# Patient Record
Sex: FEMALE | Race: WHITE | Employment: UNEMPLOYED | URBAN - METROPOLITAN AREA
[De-identification: names, ages, dates, MRNs, and addresses within clinical notes are randomized per-mention and may not be internally consistent; named-entity substitution may affect disease eponyms.]

---

## 2020-04-18 ENCOUNTER — APPOINTMENT (OUTPATIENT)
Dept: CT IMAGING | Age: 25
End: 2020-04-18
Payer: MEDICAID

## 2020-04-18 ENCOUNTER — HOSPITAL ENCOUNTER (EMERGENCY)
Age: 25
Discharge: HOME | End: 2020-04-18
Payer: MEDICAID

## 2020-04-18 VITALS
OXYGEN SATURATION: 100 % | BODY MASS INDEX: 19.63 KG/M2 | HEIGHT: 64 IN | HEART RATE: 78 BPM | SYSTOLIC BLOOD PRESSURE: 123 MMHG | WEIGHT: 115 LBS | TEMPERATURE: 98.7 F | RESPIRATION RATE: 18 BRPM | DIASTOLIC BLOOD PRESSURE: 84 MMHG

## 2020-04-18 LAB
ALBUMIN SERPL-MCNC: 4.1 GM/DL (ref 3.4–5)
ALP BLD-CCNC: 50 IU/L (ref 40–129)
ALT SERPL-CCNC: 11 U/L (ref 10–40)
ANION GAP SERPL CALCULATED.3IONS-SCNC: 10 MMOL/L (ref 4–16)
AST SERPL-CCNC: 15 IU/L (ref 15–37)
ATYPICAL LYMPHOCYTE ABSOLUTE COUNT: ABNORMAL
BACTERIA: ABNORMAL /HPF
BANDED NEUTROPHILS ABSOLUTE COUNT: 0.26 K/CU MM
BANDED NEUTROPHILS RELATIVE PERCENT: 3 % (ref 5–11)
BASOPHILS ABSOLUTE: 0.1 K/CU MM
BASOPHILS RELATIVE PERCENT: 1 % (ref 0–1)
BILIRUB SERPL-MCNC: 0.1 MG/DL (ref 0–1)
BILIRUBIN URINE: NEGATIVE MG/DL
BLOOD, URINE: ABNORMAL
BUN BLDV-MCNC: 7 MG/DL (ref 6–23)
CALCIUM SERPL-MCNC: 8.4 MG/DL (ref 8.3–10.6)
CHLORIDE BLD-SCNC: 103 MMOL/L (ref 99–110)
CLARITY: ABNORMAL
CO2: 26 MMOL/L (ref 21–32)
COLOR: YELLOW
CREAT SERPL-MCNC: 0.7 MG/DL (ref 0.6–1.1)
DIFFERENTIAL TYPE: ABNORMAL
EOSINOPHILS ABSOLUTE: 0.2 K/CU MM
EOSINOPHILS RELATIVE PERCENT: 2 % (ref 0–3)
GFR AFRICAN AMERICAN: >60 ML/MIN/1.73M2
GFR NON-AFRICAN AMERICAN: >60 ML/MIN/1.73M2
GLUCOSE BLD-MCNC: 108 MG/DL (ref 70–99)
GLUCOSE, URINE: NEGATIVE MG/DL
HCG QUALITATIVE: NEGATIVE
HCT VFR BLD CALC: 44.2 % (ref 37–47)
HEMOGLOBIN: 14 GM/DL (ref 12.5–16)
KETONES, URINE: NEGATIVE MG/DL
LEUKOCYTE ESTERASE, URINE: NEGATIVE
LIPASE: 18 IU/L (ref 13–60)
LYMPHOCYTES ABSOLUTE: 2.6 K/CU MM
LYMPHOCYTES RELATIVE PERCENT: 29 % (ref 24–44)
MCH RBC QN AUTO: 29.4 PG (ref 27–31)
MCHC RBC AUTO-ENTMCNC: 31.7 % (ref 32–36)
MCV RBC AUTO: 92.7 FL (ref 78–100)
METAMYELOCYTES ABSOLUTE COUNT: 0.09 K/CU MM
METAMYELOCYTES PERCENT: 1 %
MONOCYTES ABSOLUTE: 0.5 K/CU MM
MONOCYTES RELATIVE PERCENT: 6 % (ref 0–4)
MUCUS: ABNORMAL HPF
MYELOCYTE PERCENT: 1 %
MYELOCYTES ABSOLUTE COUNT: 0.09 K/CU MM
NITRITE URINE, QUANTITATIVE: POSITIVE
PDW BLD-RTO: 12.8 % (ref 11.7–14.9)
PH, URINE: 7 (ref 5–8)
PLATELET # BLD: 228 K/CU MM (ref 140–440)
PMV BLD AUTO: 10.9 FL (ref 7.5–11.1)
POTASSIUM SERPL-SCNC: 4.2 MMOL/L (ref 3.5–5.1)
PROTEIN UA: NEGATIVE MG/DL
RBC # BLD: 4.77 M/CU MM (ref 4.2–5.4)
RBC URINE: <1 /HPF (ref 0–6)
SEGMENTED NEUTROPHILS ABSOLUTE COUNT: 5 K/CU MM
SEGMENTED NEUTROPHILS RELATIVE PERCENT: 57 % (ref 36–66)
SODIUM BLD-SCNC: 139 MMOL/L (ref 135–145)
SPECIFIC GRAVITY UA: 1 (ref 1–1.03)
SQUAMOUS EPITHELIAL: 1 /HPF
TOTAL PROTEIN: 6.7 GM/DL (ref 6.4–8.2)
TRICHOMONAS: ABNORMAL /HPF
UROBILINOGEN, URINE: NORMAL MG/DL (ref 0.2–1)
WBC # BLD: 8.8 K/CU MM (ref 4–10.5)
WBC UA: 1 /HPF (ref 0–5)

## 2020-04-18 PROCEDURE — 6370000000 HC RX 637 (ALT 250 FOR IP): Performed by: PHYSICIAN ASSISTANT

## 2020-04-18 PROCEDURE — 99284 EMERGENCY DEPT VISIT MOD MDM: CPT

## 2020-04-18 PROCEDURE — 87077 CULTURE AEROBIC IDENTIFY: CPT

## 2020-04-18 PROCEDURE — 6360000002 HC RX W HCPCS: Performed by: PHYSICIAN ASSISTANT

## 2020-04-18 PROCEDURE — 36415 COLL VENOUS BLD VENIPUNCTURE: CPT

## 2020-04-18 PROCEDURE — 96372 THER/PROPH/DIAG INJ SC/IM: CPT

## 2020-04-18 PROCEDURE — 70450 CT HEAD/BRAIN W/O DYE: CPT

## 2020-04-18 PROCEDURE — 72125 CT NECK SPINE W/O DYE: CPT

## 2020-04-18 PROCEDURE — 85027 COMPLETE CBC AUTOMATED: CPT

## 2020-04-18 PROCEDURE — 81001 URINALYSIS AUTO W/SCOPE: CPT

## 2020-04-18 PROCEDURE — 83690 ASSAY OF LIPASE: CPT

## 2020-04-18 PROCEDURE — 84703 CHORIONIC GONADOTROPIN ASSAY: CPT

## 2020-04-18 PROCEDURE — 93010 ELECTROCARDIOGRAM REPORT: CPT | Performed by: INTERNAL MEDICINE

## 2020-04-18 PROCEDURE — 93005 ELECTROCARDIOGRAM TRACING: CPT | Performed by: PHYSICIAN ASSISTANT

## 2020-04-18 PROCEDURE — 80053 COMPREHEN METABOLIC PANEL: CPT

## 2020-04-18 PROCEDURE — 87086 URINE CULTURE/COLONY COUNT: CPT

## 2020-04-18 PROCEDURE — 87186 SC STD MICRODIL/AGAR DIL: CPT

## 2020-04-18 PROCEDURE — 85007 BL SMEAR W/DIFF WBC COUNT: CPT

## 2020-04-18 RX ORDER — ACETAMINOPHEN 500 MG
1000 TABLET ORAL ONCE
Status: COMPLETED | OUTPATIENT
Start: 2020-04-18 | End: 2020-04-18

## 2020-04-18 RX ORDER — DIPHENHYDRAMINE HYDROCHLORIDE 50 MG/ML
50 INJECTION INTRAMUSCULAR; INTRAVENOUS ONCE
Status: COMPLETED | OUTPATIENT
Start: 2020-04-18 | End: 2020-04-18

## 2020-04-18 RX ORDER — METOCLOPRAMIDE HYDROCHLORIDE 5 MG/ML
10 INJECTION INTRAMUSCULAR; INTRAVENOUS ONCE
Status: COMPLETED | OUTPATIENT
Start: 2020-04-18 | End: 2020-04-18

## 2020-04-18 RX ADMIN — METOCLOPRAMIDE 10 MG: 5 INJECTION, SOLUTION INTRAMUSCULAR; INTRAVENOUS at 12:29

## 2020-04-18 RX ADMIN — ACETAMINOPHEN 1000 MG: 500 TABLET ORAL at 12:29

## 2020-04-18 RX ADMIN — DIPHENHYDRAMINE HYDROCHLORIDE 50 MG: 50 INJECTION, SOLUTION INTRAMUSCULAR; INTRAVENOUS at 12:29

## 2020-04-18 ASSESSMENT — PAIN SCALES - GENERAL
PAINLEVEL_OUTOF10: 5
PAINLEVEL_OUTOF10: 5

## 2020-04-18 ASSESSMENT — PAIN DESCRIPTION - DESCRIPTORS: DESCRIPTORS: ACHING;CONSTANT

## 2020-04-18 ASSESSMENT — PAIN DESCRIPTION - LOCATION: LOCATION: HEAD

## 2020-04-18 ASSESSMENT — PAIN DESCRIPTION - PAIN TYPE: TYPE: ACUTE PAIN

## 2020-04-18 NOTE — ED NOTES
Tito goldman), type & screen on patient.       [facility]  04/18/20 1052

## 2020-04-18 NOTE — ED NOTES
Discharge instructions reviewed. Pt verbalized understanding.        Margie Portillo RN  04/18/20 4409

## 2020-04-21 LAB
CULTURE: ABNORMAL
Lab: ABNORMAL
SPECIMEN: ABNORMAL
TOTAL COLONY COUNT: ABNORMAL

## 2020-04-28 LAB
EKG ATRIAL RATE: 70 BPM
EKG DIAGNOSIS: NORMAL
EKG P AXIS: 55 DEGREES
EKG P-R INTERVAL: 152 MS
EKG Q-T INTERVAL: 406 MS
EKG QRS DURATION: 80 MS
EKG QTC CALCULATION (BAZETT): 438 MS
EKG R AXIS: 74 DEGREES
EKG T AXIS: 51 DEGREES
EKG VENTRICULAR RATE: 70 BPM

## 2020-06-22 ENCOUNTER — HOSPITAL ENCOUNTER (EMERGENCY)
Age: 25
Discharge: HOME | End: 2020-06-22
Attending: EMERGENCY MEDICINE
Payer: MEDICAID

## 2020-06-22 ENCOUNTER — APPOINTMENT (OUTPATIENT)
Dept: CT IMAGING | Age: 25
End: 2020-06-22
Payer: MEDICAID

## 2020-06-22 VITALS
RESPIRATION RATE: 18 BRPM | OXYGEN SATURATION: 100 % | DIASTOLIC BLOOD PRESSURE: 60 MMHG | HEIGHT: 64 IN | HEART RATE: 85 BPM | WEIGHT: 115 LBS | SYSTOLIC BLOOD PRESSURE: 109 MMHG | BODY MASS INDEX: 19.63 KG/M2 | TEMPERATURE: 98.8 F

## 2020-06-22 LAB
ALBUMIN SERPL-MCNC: 4.5 GM/DL (ref 3.4–5)
ALP BLD-CCNC: 42 IU/L (ref 40–129)
ALT SERPL-CCNC: 10 U/L (ref 10–40)
ANION GAP SERPL CALCULATED.3IONS-SCNC: 8 MMOL/L (ref 4–16)
AST SERPL-CCNC: 14 IU/L (ref 15–37)
BASOPHILS ABSOLUTE: 0.1 K/CU MM
BASOPHILS RELATIVE PERCENT: 0.6 % (ref 0–1)
BILIRUB SERPL-MCNC: 0.5 MG/DL (ref 0–1)
BUN BLDV-MCNC: 6 MG/DL (ref 6–23)
CALCIUM SERPL-MCNC: 9.3 MG/DL (ref 8.3–10.6)
CHLORIDE BLD-SCNC: 101 MMOL/L (ref 99–110)
CO2: 28 MMOL/L (ref 21–32)
CREAT SERPL-MCNC: 0.7 MG/DL (ref 0.6–1.1)
DIFFERENTIAL TYPE: ABNORMAL
EOSINOPHILS ABSOLUTE: 0.1 K/CU MM
EOSINOPHILS RELATIVE PERCENT: 1.3 % (ref 0–3)
GFR AFRICAN AMERICAN: >60 ML/MIN/1.73M2
GFR NON-AFRICAN AMERICAN: >60 ML/MIN/1.73M2
GLUCOSE BLD-MCNC: 86 MG/DL (ref 70–99)
HCG QUALITATIVE: NEGATIVE
HCT VFR BLD CALC: 37.9 % (ref 37–47)
HEMOGLOBIN: 12.5 GM/DL (ref 12.5–16)
IMMATURE NEUTROPHIL %: 0.3 % (ref 0–0.43)
LYMPHOCYTES ABSOLUTE: 3.3 K/CU MM
LYMPHOCYTES RELATIVE PERCENT: 30.7 % (ref 24–44)
MCH RBC QN AUTO: 30.2 PG (ref 27–31)
MCHC RBC AUTO-ENTMCNC: 33 % (ref 32–36)
MCV RBC AUTO: 91.5 FL (ref 78–100)
MONOCYTES ABSOLUTE: 0.9 K/CU MM
MONOCYTES RELATIVE PERCENT: 8.3 % (ref 0–4)
NUCLEATED RBC %: 0 %
PDW BLD-RTO: 13.4 % (ref 11.7–14.9)
PLATELET # BLD: 276 K/CU MM (ref 140–440)
PMV BLD AUTO: 10.3 FL (ref 7.5–11.1)
POTASSIUM SERPL-SCNC: 3.6 MMOL/L (ref 3.5–5.1)
RBC # BLD: 4.14 M/CU MM (ref 4.2–5.4)
SEGMENTED NEUTROPHILS ABSOLUTE COUNT: 6.3 K/CU MM
SEGMENTED NEUTROPHILS RELATIVE PERCENT: 58.8 % (ref 36–66)
SODIUM BLD-SCNC: 137 MMOL/L (ref 135–145)
TOTAL IMMATURE NEUTOROPHIL: 0.03 K/CU MM
TOTAL NUCLEATED RBC: 0 K/CU MM
TOTAL PROTEIN: 7.3 GM/DL (ref 6.4–8.2)
TOTAL RETICULOCYTE COUNT: 0.07 K/CU MM
WBC # BLD: 10.8 K/CU MM (ref 4–10.5)

## 2020-06-22 PROCEDURE — 70450 CT HEAD/BRAIN W/O DYE: CPT

## 2020-06-22 PROCEDURE — 99284 EMERGENCY DEPT VISIT MOD MDM: CPT

## 2020-06-22 PROCEDURE — 84703 CHORIONIC GONADOTROPIN ASSAY: CPT

## 2020-06-22 PROCEDURE — 85025 COMPLETE CBC W/AUTO DIFF WBC: CPT

## 2020-06-22 PROCEDURE — 72131 CT LUMBAR SPINE W/O DYE: CPT

## 2020-06-22 PROCEDURE — 80053 COMPREHEN METABOLIC PANEL: CPT

## 2020-06-22 RX ORDER — CYCLOBENZAPRINE HCL 10 MG
10 TABLET ORAL 3 TIMES DAILY PRN
Qty: 20 TABLET | Refills: 0 | Status: SHIPPED | OUTPATIENT
Start: 2020-06-22 | End: 2020-07-02

## 2020-06-22 ASSESSMENT — PAIN DESCRIPTION - PAIN TYPE: TYPE: ACUTE PAIN

## 2020-06-22 ASSESSMENT — PAIN SCALES - GENERAL: PAINLEVEL_OUTOF10: 7

## 2020-06-23 NOTE — ED PROVIDER NOTES
6/22/20 7/2/20 Yes Jose Guadalupe Chu PA-C   dicyclomine (BENTYL) 20 MG tablet Take 1 tablet by mouth See Admin Instructions One tablet by mouth every 6-8 hours when necessary for abdominal pain or cramping. 5/22/18   Ольга Darden MD       Social history:  reports that she has been smoking. She has been smoking about 0.50 packs per day. She has never used smokeless tobacco. She reports current alcohol use. She reports current drug use. Drug: Marijuana. Family history:  History reviewed. No pertinent family history. Exam  /60   Pulse 85   Temp 98.8 °F (37.1 °C) (Oral)   Resp 18   Ht 5' 4.25\" (1.632 m)   Wt 115 lb (52.2 kg)   SpO2 100%   BMI 19.59 kg/m²   Nursing note and vitals reviewed. Constitutional: Well developed, well nourished. No acute distress. HENT:      Head: Normocephalic and atraumatic. Ears: External ears normal.      Nose: Nose normal.     Mouth: Membrane mucosa moist and pink. No posterior oropharynx erythema or tonsillar edema  Eyes: Anicteric sclera. No discharge, PERRL  Neck: Supple. Trachea midline. Cardiovascular: RRR, no murmurs, rubs, or gallops, radial pulses 2+ bilaterally. Pulmonary/Chest: Effort normal. No respiratory distress. CTAB. No stridor. No wheezes. No rales. Abdominal: Soft. Nontender to palpation. No distension. No guarding, rebound tenderness, or evidence of ascites. : No CVA tenderness. Musculoskeletal: Moves all extremities. No gross deformity. No tenderness palpation of the cervical, thoracic, lumbar spine or paraspinal muscles. When palpating thoracic spine patient reports this exacerbates her spasms, has retraction of the bilateral scapula in a rhythmic and circular manner as well as abnormal movements of the left hand. This stops when I am not palpating patient's back. When examining her left hand her left elbow begins to flex in a spasmodic fashion, this also stops when I stop examining her.   NEUROLOGICAL: Awake and alert. GCS 15. Cranial nerves 2-12 grossly intact. Strength 5/5 throughout. Light touch sensation intact throughout. Finger to nose WNL. DTR's 2+ in all 4 extremities. Gait normal.  Skin: Warm and dry. No rash. Psychiatric: Normal mood and affect. Behavior is normal.      Radiographs (if obtained):  [] The following radiograph was interpreted by myself in the absence of a radiologist:   [x] Radiologist's Report Reviewed:  CT Head WO Contrast   Final Result   No acute intracranial abnormality. Stable mild Chiari I malformation. CT LUMBAR SPINE WO CONTRAST   Final Result   Unremarkable non-contrast CT of the lumbar spine.                 Labs  Results for orders placed or performed during the hospital encounter of 06/22/20   CBC Auto Differential   Result Value Ref Range    WBC 10.8 (H) 4.0 - 10.5 K/CU MM    RBC 4.14 (L) 4.2 - 5.4 M/CU MM    Hemoglobin 12.5 12.5 - 16.0 GM/DL    Hematocrit 37.9 37 - 47 %    MCV 91.5 78 - 100 FL    MCH 30.2 27 - 31 PG    MCHC 33.0 32.0 - 36.0 %    RDW 13.4 11.7 - 14.9 %    Platelets 081 865 - 533 K/CU MM    MPV 10.3 7.5 - 11.1 FL    Differential Type AUTOMATED DIFFERENTIAL     Segs Relative 58.8 36 - 66 %    Lymphocytes % 30.7 24 - 44 %    Monocytes % 8.3 (H) 0 - 4 %    Eosinophils % 1.3 0 - 3 %    Basophils % 0.6 0 - 1 %    Segs Absolute 6.3 K/CU MM    Lymphocytes Absolute 3.3 K/CU MM    Monocytes Absolute 0.9 K/CU MM    Eosinophils Absolute 0.1 K/CU MM    Basophils Absolute 0.1 K/CU MM    Nucleated RBC % 0.0 %    Total Nucleated RBC 0.0 K/CU MM    TRC 0.0696 K/CU MM    Total Immature Neutrophil 0.03 K/CU MM    Immature Neutrophil % 0.3 0 - 0.43 %   CMP   Result Value Ref Range    Sodium 137 135 - 145 MMOL/L    Potassium 3.6 3.5 - 5.1 MMOL/L    Chloride 101 99 - 110 mMol/L    CO2 28 21 - 32 MMOL/L    BUN 6 6 - 23 MG/DL    CREATININE 0.7 0.6 - 1.1 MG/DL    Glucose 86 70 - 99 MG/DL    Calcium 9.3 8.3 - 10.6 MG/DL    Alb 4.5 3.4 - 5.0 GM/DL    Total Protein 7.3 6.4 - 8.2 GM/DL    Total Bilirubin 0.5 0.0 - 1.0 MG/DL    ALT 10 10 - 40 U/L    AST 14 (L) 15 - 37 IU/L    Alkaline Phosphatase 42 40 - 129 IU/L    GFR Non-African American >60 >60 mL/min/1.73m2    GFR African American >60 >60 mL/min/1.73m2    Anion Gap 8 4 - 16   HCG Serum, Qualitative   Result Value Ref Range    hCG Qual NEGATIVE          MDM  Patient presents for multiple symptoms. Muscle spasms appear to occur only when being examined, none at rest.  She appears in no distress. At this time I do not see any evidence of a dystonic reaction or any true spasticity to raise concern for worsening Chiari malformation. Imaging of head and lumbar spine reveals no acute findings, stable mild Chiari malformation. Laboratory work-up reveals slight leukocytosis but is otherwise unremarkable. Patient is requesting different neurologist for follow-up, provided Dr. GALINDO Liu's information. Recommended calling their office today for earliest appointment. Will start on flexeril for spasms. Follow-up with PCP in 2 to 3 days for recheck. Return to ED for any new or worsening symptoms. Assessment and plan discussed with patient understands and agrees. This patient was also seen and evaluated by Dr. Rajeev Cavanaugh. Final Impression  1. Dizziness    2. Spasm of muscle    3. Acute left-sided low back pain without sciatica    4. Chiari malformation type I ([address]        Blood pressure 109/60, pulse 85, temperature 98.8 °F (37.1 °C), temperature source Oral, resp. rate 18, height 5' 4.25\" (1.632 m), weight 115 lb (52.2 kg), SpO2 100 %. Disposition:  Discharge to home in stable condition. Patient was given scripts for the following medications. I counseled patient how to take these medications. New Prescriptions    CYCLOBENZAPRINE (FLEXERIL) 10 MG TABLET    Take 1 tablet by mouth 3 times daily as needed for Muscle spasms       This chart was generated using the [employer] dictation system.  I created this record but it may

## 2020-07-02 NOTE — ED PROVIDER NOTES
I independently examined and evaluated [place]. In brief, patient complaining of lightheadedness and muscle spasms. Focused exam revealed resting comfortably, respirations nonlabored, moves all extremities equally, patient appears to shake her arm when she moves in certain directions but then stops it on her own, cranial nerves grossly intact, gait intact, pupils are equally reactive. ED course: Patient here with lightheadedness, she is complaining of these muscle spasms but they do not seem involuntary, she had a work-up, results are nonemergent she will be given outpatient follow-up instructions she understands and agrees return warnings given. NIH stroke scale is 0    All diagnostic, treatment, and disposition decisions were made by myself in conjunction with the advanced practice provider. For all further details of the patient's emergency department visit, please see the advanced practice provider's documentation. Comment: Please note this report has been produced using speech recognition software and may contain errors related to that system including errors in grammar, punctuation, and spelling, as well as words and phrases that may be inappropriate. If there are any questions or concerns please feel free to contact the dictating provider for clarification.        Russell Adams MD  07/02/20 9977

## 2020-08-15 ENCOUNTER — HOSPITAL ENCOUNTER (EMERGENCY)
Age: 25
Discharge: HOME | End: 2020-08-15
Attending: EMERGENCY MEDICINE
Payer: MEDICAID

## 2020-08-15 VITALS
BODY MASS INDEX: 19.63 KG/M2 | DIASTOLIC BLOOD PRESSURE: 50 MMHG | HEIGHT: 64 IN | SYSTOLIC BLOOD PRESSURE: 95 MMHG | RESPIRATION RATE: 16 BRPM | HEART RATE: 82 BPM | WEIGHT: 115 LBS | TEMPERATURE: 97.7 F | OXYGEN SATURATION: 100 %

## 2020-08-15 LAB
ACETAMINOPHEN LEVEL: <5 UG/ML (ref 15–30)
ALBUMIN SERPL-MCNC: 4.3 GM/DL (ref 3.4–5)
ALCOHOL SCREEN SERUM: <0.01 %WT/VOL
ALP BLD-CCNC: 58 IU/L (ref 40–129)
ALT SERPL-CCNC: 19 U/L (ref 10–40)
AMPHETAMINES: ABNORMAL
ANION GAP SERPL CALCULATED.3IONS-SCNC: 16 MMOL/L (ref 4–16)
AST SERPL-CCNC: 59 IU/L (ref 15–37)
BARBITURATE SCREEN URINE: NEGATIVE
BENZODIAZEPINE SCREEN, URINE: NEGATIVE
BILIRUB SERPL-MCNC: 0.8 MG/DL (ref 0–1)
BUN BLDV-MCNC: 20 MG/DL (ref 6–23)
CALCIUM SERPL-MCNC: 9.1 MG/DL (ref 8.3–10.6)
CANNABINOID SCREEN URINE: ABNORMAL
CHLORIDE BLD-SCNC: 97 MMOL/L (ref 99–110)
CO2: 21 MMOL/L (ref 21–32)
COCAINE METABOLITE: ABNORMAL
CREAT SERPL-MCNC: 0.9 MG/DL (ref 0.6–1.1)
DOSE AMOUNT: ABNORMAL
DOSE AMOUNT: ABNORMAL
DOSE TIME: ABNORMAL
DOSE TIME: ABNORMAL
GFR AFRICAN AMERICAN: >60 ML/MIN/1.73M2
GFR NON-AFRICAN AMERICAN: >60 ML/MIN/1.73M2
GLUCOSE BLD-MCNC: 85 MG/DL (ref 70–99)
HCG QUALITATIVE: NEGATIVE
HCT VFR BLD CALC: 38 % (ref 37–47)
HEMOGLOBIN: 12.7 GM/DL (ref 12.5–16)
MAGNESIUM: 2.3 MG/DL (ref 1.8–2.4)
MCH RBC QN AUTO: 30.1 PG (ref 27–31)
MCHC RBC AUTO-ENTMCNC: 33.4 % (ref 32–36)
MCV RBC AUTO: 90 FL (ref 78–100)
OPIATES, URINE: NEGATIVE
OXYCODONE: NEGATIVE
PDW BLD-RTO: 12.4 % (ref 11.7–14.9)
PHENCYCLIDINE, URINE: NEGATIVE
PLATELET # BLD: 275 K/CU MM (ref 140–440)
PMV BLD AUTO: 9.8 FL (ref 7.5–11.1)
POTASSIUM SERPL-SCNC: 3.5 MMOL/L (ref 3.5–5.1)
RBC # BLD: 4.22 M/CU MM (ref 4.2–5.4)
SALICYLATE LEVEL: <0.3 MG/DL (ref 15–30)
SODIUM BLD-SCNC: 134 MMOL/L (ref 135–145)
TOTAL PROTEIN: 7.2 GM/DL (ref 6.4–8.2)
WBC # BLD: 12.5 K/CU MM (ref 4–10.5)

## 2020-08-15 PROCEDURE — 99284 EMERGENCY DEPT VISIT MOD MDM: CPT

## 2020-08-15 PROCEDURE — 94761 N-INVAS EAR/PLS OXIMETRY MLT: CPT

## 2020-08-15 PROCEDURE — 96372 THER/PROPH/DIAG INJ SC/IM: CPT

## 2020-08-15 PROCEDURE — 6360000002 HC RX W HCPCS: Performed by: EMERGENCY MEDICINE

## 2020-08-15 PROCEDURE — 85027 COMPLETE CBC AUTOMATED: CPT

## 2020-08-15 PROCEDURE — G0480 DRUG TEST DEF 1-7 CLASSES: HCPCS

## 2020-08-15 PROCEDURE — 84703 CHORIONIC GONADOTROPIN ASSAY: CPT

## 2020-08-15 PROCEDURE — 83735 ASSAY OF MAGNESIUM: CPT

## 2020-08-15 PROCEDURE — 80307 DRUG TEST PRSMV CHEM ANLYZR: CPT

## 2020-08-15 PROCEDURE — 80053 COMPREHEN METABOLIC PANEL: CPT

## 2020-08-15 RX ORDER — LORAZEPAM 2 MG/ML
2 INJECTION INTRAMUSCULAR ONCE
Status: COMPLETED | OUTPATIENT
Start: 2020-08-15 | End: 2020-08-15

## 2020-08-15 RX ORDER — HALOPERIDOL 5 MG/ML
5 INJECTION INTRAMUSCULAR ONCE
Status: COMPLETED | OUTPATIENT
Start: 2020-08-15 | End: 2020-08-15

## 2020-08-15 RX ADMIN — HALOPERIDOL LACTATE 5 MG: 5 INJECTION, SOLUTION INTRAMUSCULAR at 09:00

## 2020-08-15 RX ADMIN — LORAZEPAM 2 MG: 2 INJECTION INTRAMUSCULAR; INTRAVENOUS at 09:00

## 2020-08-15 NOTE — ED NOTES
Pt resting at this time eyes closed. No acute distress noted. Green gown remains in place, 1:1 continuous monitoring maintained. Sitter at the bedside.  Will continue to monitor     Drea Bryant RN  08/15/20 9584

## 2020-08-15 NOTE — ED NOTES
Pt resting at this time eyes closed. No acute distress noted. Green gown remains in place, 1:1 continuous monitoring maintained. Sitter at the bedside.  Will continue to monitor     Zigmund Samples, MOLINA  08/15/20 9726

## 2020-08-15 NOTE — ED NOTES
Pt resting at this time eyes closed. No acute distress noted. Green gown remains in place, 1:1 continuous monitoring maintained. Sitter at the bedside.  Will continue to monitor     Sammi Méndez RN  08/15/20 4924

## 2020-08-15 NOTE — ED PROVIDER NOTES
EMERGENCY DEPARTMENT ENCOUNTER      PCP: [facility] ([facility])        CHIEF COMPLAINT    Chief Complaint   Patient presents with   [address] Problem             This patient was not evaluated by the attending physician. I have independently evaluated this patient. HPI    Abraham is a 26 y.o. female who presents via SPD for aggressive behavior. 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 Patient offers no history today and will not answer my questions. Patient is very disruptive, cursing and yelling in the emergency department upon arrival.    REVIEW OF SYSTEMS    Psychiatric: See HPI. Unable to obtain further history due to poor patient cooperation. PAST MEDICAL & SURGICALHISTORY    History reviewed. No pertinent past medical history. History reviewed. No pertinent surgical history. CURRENT MEDICATIONS    Current Outpatient Rx   Medication Sig Dispense Refill    dicyclomine (BENTYL) 20 MG tablet Take 1 tablet by mouth See Admin Instructions One tablet by mouth every 6-8 hours when necessary for abdominal pain or cramping. 20 tablet 0       ALLERGIES    No Known Allergies    SOCIAL & FAMILYHISTORY    Social History     Socioeconomic History    Marital status: Single     Spouse name: None    Number of children: None    Years of education: None    Highest education level: None   Occupational History    None   Social Needs    Financial resource strain: None    Food insecurity     Worry: None     Inability: None    Transportation needs     Medical: None     Non-medical: None   Tobacco Use    Smoking status: Current Every Day Smoker     Packs/day: 0.50    Smokeless tobacco: Never Used   Substance and Sexual Activity    Alcohol use:  Yes    Drug use: Yes     Types: Marijuana     Comment: daily    Sexual activity: None   Lifestyle    Physical activity     Days per week: None Cocaine Metabolite UNCONFIRMED POSITIVE (A) NEGATIVE    Benzodiazepine Screen, Urine NEGATIVE NEGATIVE    Barbiturate Screen, Ur NEGATIVE NEGATIVE    Opiates, Urine NEGATIVE NEGATIVE    Phencyclidine, Urine NEGATIVE NEGATIVE    Oxycodone NEGATIVE NEGATIVE   Salicylate   Result Value Ref Range    Salicylate Lvl <3.3 (L) 15 - 30 MG/DL    DOSE AMOUNT DOSE AMT. GIVEN - UNKNOWN     DOSE TIME DOSE TIME GIVEN - UNKNOWN    HCG Qualitative, Serum   Result Value Ref Range    hCG Qual NEGATIVE    Ethanol   Result Value Ref Range    Alcohol Scrn <0.01 <0.01 %WT/VOL   Acetaminophen Level   Result Value Ref Range    Acetaminophen Level <5.0 (L) 15 - 30 ug/ml    DOSE AMOUNT DOSE AMT. GIVEN - UNKNOWN     DOSE TIME DOSE TIME GIVEN - UNKNOWN    CBC   Result Value Ref Range    WBC 12.5 (H) 4.0 - 10.5 K/CU MM    RBC 4.22 4.2 - 5.4 M/CU MM    Hemoglobin 12.7 12.5 - 16.0 GM/DL    Hematocrit 38.0 37 - 47 %    MCV 90.0 78 - 100 FL    MCH 30.1 27 - 31 PG    MCHC 33.4 32.0 - 36.0 %    RDW 12.4 11.7 - 14.9 %    Platelets 076 708 - 441 K/CU MM    MPV 9.8 7.5 - 11.1 FL   Comprehensive Metabolic Panel w/ Reflex to MG   Result Value Ref Range    Sodium 134 (L) 135 - 145 MMOL/L    Potassium 3.5 3.5 - 5.1 MMOL/L    Chloride 97 (L) 99 - 110 mMol/L    CO2 21 21 - 32 MMOL/L    BUN 20 6 - 23 MG/DL    CREATININE 0.9 0.6 - 1.1 MG/DL    Glucose 85 70 - 99 MG/DL    Calcium 9.1 8.3 - 10.6 MG/DL    Alb 4.3 3.4 - 5.0 GM/DL    Total Protein 7.2 6.4 - 8.2 GM/DL    Total Bilirubin 0.8 0.0 - 1.0 MG/DL    ALT 19 10 - 40 U/L    AST 59 (H) 15 - 37 IU/L    Alkaline Phosphatase 58 40 - 129 IU/L    GFR Non-African American >60 >60 mL/min/1.73m2    GFR African American >60 >60 mL/min/1.73m2    Anion Gap 16 4 - 16   Magnesium   Result Value Ref Range    Magnesium 2.3 1.8 - 2.4 mg/dl           ED COURSE & MEDICAL DECISION MAKING      Patient presents very verbally combative. Patient's laboratory evaluation today shows polysubstance use.   No smell of alcohol or alcohol present on labs today. Otherwise, there are no medical problems identified which require immediate intervention or which would preclude psychiatric evaluation      Consultation: Mental health Professional consulted in the emergency Department. See [facility] note for details of MH evaluation. ________________________________________________________________________    0536 -  I have signed out [facility] Emergency Department care to Dr. Maunel Fuller. Bedside hand-off performed. We discussed the history, physical exam, completed/pending test results (if obtained) and current treatment plan. At time of patient signout MH consult pending.    ________________________________________________________________________          Clinical  IMPRESSION    1. Mental health problem    2. Positive urine drug screen    3. Behavior problem, adult              Comment: Please note this report has been produced using speech recognition software and may contain errors related to that system including errors in grammar, punctuation, and spelling, as well as words and phrases that may be inappropriate. If there are any questions or concerns please feel free to contact the dictating provider for clarification.        Judith Corrigan  08/18/20 5344

## 2020-08-15 NOTE — ED PROVIDER NOTES
08/15/20 p.m. Tim Sauceda was checked out to me by Siri AHUJA. Please see his/her initial documentation for details of the patient's ED presentation, physical exam and completed studies. In brief, Tim Sauceda is a 26 y.o. female that presents with mental health problem, drug use awaiting sobriety/re eval.    I have reviewed and interpreted all of the currently available lab results from this visit (if applicable):  Results for orders placed or performed during the hospital encounter of 08/15/20   Urine Drug Screen   Result Value Ref Range    Cannabinoid Scrn, Ur UNCONFIRMED POSITIVE (A) NEGATIVE    Amphetamines UNCONFIRMED POSITIVE (A) NEGATIVE    Cocaine Metabolite UNCONFIRMED POSITIVE (A) NEGATIVE    Benzodiazepine Screen, Urine NEGATIVE NEGATIVE    Barbiturate Screen, Ur NEGATIVE NEGATIVE    Opiates, Urine NEGATIVE NEGATIVE    Phencyclidine, Urine NEGATIVE NEGATIVE    Oxycodone NEGATIVE NEGATIVE   Salicylate   Result Value Ref Range    Salicylate Lvl <3.3 (L) 15 - 30 MG/DL    DOSE AMOUNT DOSE AMT. GIVEN - UNKNOWN     DOSE TIME DOSE TIME GIVEN - UNKNOWN    HCG Qualitative, Serum   Result Value Ref Range    hCG Qual NEGATIVE    Ethanol   Result Value Ref Range    Alcohol Scrn <0.01 <0.01 %WT/VOL   Acetaminophen Level   Result Value Ref Range    Acetaminophen Level <5.0 (L) 15 - 30 ug/ml    DOSE AMOUNT DOSE AMT.  GIVEN - UNKNOWN     DOSE TIME DOSE TIME GIVEN - UNKNOWN    CBC   Result Value Ref Range    WBC 12.5 (H) 4.0 - 10.5 K/CU MM    RBC 4.22 4.2 - 5.4 M/CU MM    Hemoglobin 12.7 12.5 - 16.0 GM/DL    Hematocrit 38.0 37 - 47 %    MCV 90.0 78 - 100 FL    MCH 30.1 27 - 31 PG    MCHC 33.4 32.0 - 36.0 %    RDW 12.4 11.7 - 14.9 %    Platelets 188 102 - 752 K/CU MM    MPV 9.8 7.5 - 11.1 FL   Comprehensive Metabolic Panel w/ Reflex to MG   Result Value Ref Range    Sodium 134 (L) 135 - 145 MMOL/L    Potassium 3.5 3.5 - 5.1 MMOL/L    Chloride 97 (L) 99 - 110 mMol/L    CO2 21 21 - 32 MMOL/L    BUN 20 6 mis-transcribed.)    Leo Hoboken, DO         Makaylay Hoboken, DO  08/15/20 2110       Richffery Hoboken, DO  08/15/20 2207

## 2020-08-15 NOTE — ED NOTES
Pt resting at this time eyes closed. No acute distress noted. Green gown remains in place, 1:1 continuous monitoring maintained. Sitter at the bedside.  Will continue to monitor     Armin Ha RN  08/15/20 3989

## 2020-08-15 NOTE — ED NOTES
Pt changed into green gown and belongings collected. 1:1 sitter in place.       Darron De León RN  08/15/20 8421

## 2020-08-15 NOTE — ED NOTES
Pt resting at this time eyes closed. No acute distress noted. Green gown remains in place, 1:1 continuous monitoring maintained. Sitter at the bedside.  Will continue to monitor     Librado Reynaga RN  08/15/20 3277

## 2020-08-15 NOTE — ED NOTES
Pt resting at this time eyes closed. No acute distress noted. Green gown remains in place, 1:1 continuous monitoring maintained. Sitter at the bedside.  Will continue to monitor     William Warren RN  08/15/20 2361

## 2020-08-15 NOTE — ED NOTES
Report received by Ernie Sosa. PT in room sleeping. No distress noted. No labored breathing. Safety measures in place. Sitter at bedside.       Dequan Chavarria RN  08/15/20 8867

## 2020-08-15 NOTE — ED NOTES
Pt resting at this time eyes closed. No acute distress noted. Green gown remains in place, 1:1 continuous monitoring maintained. Sitter at the bedside.  Will continue to monitor     Darron De León RN  08/15/20 3388

## 2020-08-15 NOTE — ED TRIAGE NOTES
Pt went to  [facility] to meet parents. Was yelling out and having behaviors.  pt kicked her out she then went to gas station on [address] and was yelling at customers and talking to self/objects. Pt picked up by police and pink slipped.

## 2020-08-15 NOTE — ED NOTES
Pt resting at this time eyes closed. No acute distress noted. Green gown remains in place, 1:1 continuous monitoring maintained. Sitter at the bedside.  Will continue to monitor     Marnie Villatoro RN  08/15/20 7784

## 2020-08-15 NOTE — ED NOTES
Pt resting at this time eyes closed. No acute distress noted. Green gown remains in place, 1:1 continuous monitoring maintained. Sitter at the bedside.  Will continue to monitor     Danial Rios RN  08/15/20 8112

## 2020-08-15 NOTE — ED NOTES
Pt resting at this time eyes closed. No acute distress noted. Green gown remains in place, 1:1 continuous monitoring maintained. Sitter at the bedside.  Will continue to monitor     Karly Portillo RN  08/15/20 3338

## 2020-08-16 NOTE — ED NOTES
Patient gave her father phone number [phone] to call her a ride to pick her up from the ED.      Ciera Oconnell RN  08/15/20 9856

## 2020-08-16 NOTE — ED NOTES
PT would not speak to this nurse because the PT wanted to have \"printed out proof\" of credentials for this nurse.       Godwin Costa RN  08/15/20 5999

## 2020-08-23 ENCOUNTER — HOSPITAL ENCOUNTER (EMERGENCY)
Age: 25
Discharge: PSYCHIATRIC OR SUBSTANCE USE FACILITY | End: 2020-08-26
Attending: EMERGENCY MEDICINE
Payer: MEDICAID

## 2020-08-23 LAB
ACETAMINOPHEN LEVEL: <5 UG/ML (ref 15–30)
ALBUMIN SERPL-MCNC: 4.1 GM/DL (ref 3.4–5)
ALCOHOL SCREEN SERUM: <0.01 %WT/VOL
ALP BLD-CCNC: 54 IU/L (ref 40–129)
ALT SERPL-CCNC: 8 U/L (ref 10–40)
AMPHETAMINES: NEGATIVE
ANION GAP SERPL CALCULATED.3IONS-SCNC: 7 MMOL/L (ref 4–16)
AST SERPL-CCNC: 15 IU/L (ref 15–37)
BARBITURATE SCREEN URINE: NEGATIVE
BASOPHILS ABSOLUTE: 0 K/CU MM
BASOPHILS RELATIVE PERCENT: 0.6 % (ref 0–1)
BENZODIAZEPINE SCREEN, URINE: NEGATIVE
BILIRUB SERPL-MCNC: 0.4 MG/DL (ref 0–1)
BUN BLDV-MCNC: 5 MG/DL (ref 6–23)
CALCIUM SERPL-MCNC: 9 MG/DL (ref 8.3–10.6)
CANNABINOID SCREEN URINE: NEGATIVE
CHLORIDE BLD-SCNC: 103 MMOL/L (ref 99–110)
CO2: 27 MMOL/L (ref 21–32)
COCAINE METABOLITE: NEGATIVE
CREAT SERPL-MCNC: 0.8 MG/DL (ref 0.6–1.1)
DIFFERENTIAL TYPE: ABNORMAL
DOSE AMOUNT: ABNORMAL
DOSE AMOUNT: ABNORMAL
DOSE TIME: ABNORMAL
DOSE TIME: ABNORMAL
EOSINOPHILS ABSOLUTE: 0.3 K/CU MM
EOSINOPHILS RELATIVE PERCENT: 4.3 % (ref 0–3)
GFR AFRICAN AMERICAN: >60 ML/MIN/1.73M2
GFR NON-AFRICAN AMERICAN: >60 ML/MIN/1.73M2
GLUCOSE BLD-MCNC: 90 MG/DL (ref 70–99)
HCG QUALITATIVE: NEGATIVE
HCT VFR BLD CALC: 40 % (ref 37–47)
HEMOGLOBIN: 13.1 GM/DL (ref 12.5–16)
IMMATURE NEUTROPHIL %: 0.4 % (ref 0–0.43)
LYMPHOCYTES ABSOLUTE: 2.4 K/CU MM
LYMPHOCYTES RELATIVE PERCENT: 33.5 % (ref 24–44)
MCH RBC QN AUTO: 29.7 PG (ref 27–31)
MCHC RBC AUTO-ENTMCNC: 32.8 % (ref 32–36)
MCV RBC AUTO: 90.7 FL (ref 78–100)
MONOCYTES ABSOLUTE: 0.7 K/CU MM
MONOCYTES RELATIVE PERCENT: 9.4 % (ref 0–4)
NUCLEATED RBC %: 0 %
OPIATES, URINE: NEGATIVE
OXYCODONE: NEGATIVE
PDW BLD-RTO: 12.6 % (ref 11.7–14.9)
PHENCYCLIDINE, URINE: NEGATIVE
PLATELET # BLD: 276 K/CU MM (ref 140–440)
PMV BLD AUTO: 10 FL (ref 7.5–11.1)
POTASSIUM SERPL-SCNC: 4 MMOL/L (ref 3.5–5.1)
RBC # BLD: 4.41 M/CU MM (ref 4.2–5.4)
SALICYLATE LEVEL: 0.6 MG/DL (ref 15–30)
SEGMENTED NEUTROPHILS ABSOLUTE COUNT: 3.7 K/CU MM
SEGMENTED NEUTROPHILS RELATIVE PERCENT: 51.8 % (ref 36–66)
SODIUM BLD-SCNC: 137 MMOL/L (ref 135–145)
TOTAL IMMATURE NEUTOROPHIL: 0.03 K/CU MM
TOTAL NUCLEATED RBC: 0 K/CU MM
TOTAL PROTEIN: 6.3 GM/DL (ref 6.4–8.2)
WBC # BLD: 7.2 K/CU MM (ref 4–10.5)

## 2020-08-23 PROCEDURE — 94761 N-INVAS EAR/PLS OXIMETRY MLT: CPT

## 2020-08-23 PROCEDURE — 80053 COMPREHEN METABOLIC PANEL: CPT

## 2020-08-23 PROCEDURE — 99285 EMERGENCY DEPT VISIT HI MDM: CPT

## 2020-08-23 PROCEDURE — 6360000002 HC RX W HCPCS: Performed by: PHYSICIAN ASSISTANT

## 2020-08-23 PROCEDURE — G0480 DRUG TEST DEF 1-7 CLASSES: HCPCS

## 2020-08-23 PROCEDURE — 85025 COMPLETE CBC W/AUTO DIFF WBC: CPT

## 2020-08-23 PROCEDURE — 80307 DRUG TEST PRSMV CHEM ANLYZR: CPT

## 2020-08-23 PROCEDURE — 96372 THER/PROPH/DIAG INJ SC/IM: CPT

## 2020-08-23 PROCEDURE — 84703 CHORIONIC GONADOTROPIN ASSAY: CPT

## 2020-08-23 RX ORDER — LORAZEPAM 2 MG/ML
2 INJECTION INTRAMUSCULAR ONCE
Status: COMPLETED | OUTPATIENT
Start: 2020-08-23 | End: 2020-08-23

## 2020-08-23 RX ORDER — DIPHENHYDRAMINE HYDROCHLORIDE 50 MG/ML
50 INJECTION INTRAMUSCULAR; INTRAVENOUS ONCE
Status: COMPLETED | OUTPATIENT
Start: 2020-08-23 | End: 2020-08-23

## 2020-08-23 RX ORDER — HALOPERIDOL 5 MG/ML
5 INJECTION INTRAMUSCULAR ONCE
Status: COMPLETED | OUTPATIENT
Start: 2020-08-23 | End: 2020-08-23

## 2020-08-23 RX ADMIN — LORAZEPAM 2 MG: 2 INJECTION, SOLUTION INTRAMUSCULAR; INTRAVENOUS at 04:50

## 2020-08-23 RX ADMIN — DIPHENHYDRAMINE HYDROCHLORIDE 50 MG: 50 INJECTION INTRAMUSCULAR; INTRAVENOUS at 04:50

## 2020-08-23 RX ADMIN — HALOPERIDOL LACTATE 5 MG: 5 INJECTION, SOLUTION INTRAMUSCULAR at 04:50

## 2020-08-23 NOTE — ED PROVIDER NOTES
I independently examined and evaluated Abraham. In brief their history revealed patient is brought in by police agitated and pink slipped. Patient is aggressive and uncooperative on arrival and is yelling and screaming. Patient is with known substance abuse problems. Their focused exam revealed the patient is  afebrile and hemodynamically stable on room air. The patient appears age appropriate, appears well-hydrated, well-nourished. Mucous membranes are moist. Speech is clear. Breathing is unlabored. Skin is dry. Mental status is normal. The patient moves all extremities and is without facial droop. Results for orders placed or performed during the hospital encounter of 08/23/20   Ethanol   Result Value Ref Range    Alcohol Scrn <0.01 <0.22 %WT/VOL   Salicylate   Result Value Ref Range    Salicylate Lvl 0.6 (L) 15 - 30 MG/DL    DOSE AMOUNT DOSE AMT. GIVEN - Unknown     DOSE TIME DOSE TIME GIVEN - Unknown    Acetaminophen Level   Result Value Ref Range    Acetaminophen Level <5.0 (L) 15 - 30 ug/ml    DOSE AMOUNT DOSE AMT.  GIVEN - UNKNOWN     DOSE TIME DOSE TIME GIVEN - UNKNOWN    Urine Drug Screen   Result Value Ref Range    Cannabinoid Scrn, Ur NEGATIVE NEGATIVE    Amphetamines NEGATIVE NEGATIVE    Cocaine Metabolite NEGATIVE NEGATIVE    Benzodiazepine Screen, Urine NEGATIVE NEGATIVE    Barbiturate Screen, Ur NEGATIVE NEGATIVE    Opiates, Urine NEGATIVE NEGATIVE    Phencyclidine, Urine NEGATIVE NEGATIVE    Oxycodone NEGATIVE NEGATIVE   CBC Auto Differential   Result Value Ref Range    WBC 7.2 4.0 - 10.5 K/CU MM    RBC 4.41 4.2 - 5.4 M/CU MM    Hemoglobin 13.1 12.5 - 16.0 GM/DL    Hematocrit 40.0 37 - 47 %    MCV 90.7 78 - 100 FL    MCH 29.7 27 - 31 PG    MCHC 32.8 32.0 - 36.0 %    RDW 12.6 11.7 - 14.9 %    Platelets 677 965 - 256 K/CU MM    MPV 10.0 7.5 - 11.1 FL    Differential Type AUTOMATED DIFFERENTIAL     Segs Relative 51.8 36 - 66 %    Lymphocytes % 33.5 24 - 44 %    Monocytes % 9.4 (H) 0 - 4 %    Eosinophils % 4.3 (H) 0 - 3 %    Basophils % 0.6 0 - 1 %    Segs Absolute 3.7 K/CU MM    Lymphocytes Absolute 2.4 K/CU MM    Monocytes Absolute 0.7 K/CU MM    Eosinophils Absolute 0.3 K/CU MM    Basophils Absolute 0.0 K/CU MM    Nucleated RBC % 0.0 %    Total Nucleated RBC 0.0 K/CU MM    Total Immature Neutrophil 0.03 K/CU MM    Immature Neutrophil % 0.4 0 - 0.43 %   Comprehensive Metabolic Panel w/ Reflex to MG   Result Value Ref Range    Sodium 137 135 - 145 MMOL/L    Potassium 4.0 3.5 - 5.1 MMOL/L    Chloride 103 99 - 110 mMol/L    CO2 27 21 - 32 MMOL/L    BUN 5 (L) 6 - 23 MG/DL    CREATININE 0.8 0.6 - 1.1 MG/DL    Glucose 90 70 - 99 MG/DL    Calcium 9.0 8.3 - 10.6 MG/DL    Alb 4.1 3.4 - 5.0 GM/DL    Total Protein 6.3 (L) 6.4 - 8.2 GM/DL    Total Bilirubin 0.4 0.0 - 1.0 MG/DL    ALT 8 (L) 10 - 40 U/L    AST 15 15 - 37 IU/L    Alkaline Phosphatase 54 40 - 129 IU/L    GFR Non-African American >60 >60 mL/min/1.73m2    GFR African American >60 >60 mL/min/1.73m2    Anion Gap 7 4 - 16   HCG Serum, Qualitative   Result Value Ref Range    hCG Qual NEGATIVE          ED course: Pt presents as above. Emergent conditions considered. Presentation prompted initial labs as above. Patient is sedated on arrival for her initial aggressive/uncooperative behavior. Patient is medically cleared with labs remains in suicidal precautions upon my initial evaluation. Patient is awaiting mental health assessment this time. Patient is evaluated by mental health and they are seeking placement. Patient is accepted to [facility] psychiatry by Dr. Prema Gong. Transportation is arranged and patient to be transferred. All diagnostic, treatment, and disposition decisions were made by myself in conjunction with the Advanced Practice Provider. For all further details of the patient's emergency department visit, please see the Advanced Practice Provider's documentation.        Hakeem Lay MD  08/23/20 4188

## 2020-08-23 NOTE — ED PROVIDER NOTES
EMERGENCY DEPARTMENT ENCOUNTER      PCP: [facility] (Inactive)        CHIEF COMPLAINT    Chief Complaint   Patient presents with    Mental Health Problem         0600-patient was signed out to me at bedside by LEIGHA Pepper. At time of signout, mental health evaluation pending. This patient was not evaluated by the attending physician. I have independently evaluated this patient. HPI    Abraham is a 26 y.o. female who presents via law enforcement for agitated behavior, yelling in the neighborhood at people that are not visibly there. Further HPI unable to obtain due to poor patient cooperation. REVIEW OF SYSTEMS    Psychiatric: See HPI. Unable to obtain further HPI, ROS due to poor patient cooperation. PAST MEDICAL & SURGICALHISTORY    No past medical history on file. No past surgical history on file. CURRENT MEDICATIONS    Current Outpatient Rx   Medication Sig Dispense Refill    dicyclomine (BENTYL) 20 MG tablet Take 1 tablet by mouth See Admin Instructions One tablet by mouth every 6-8 hours when necessary for abdominal pain or cramping. 20 tablet 0       ALLERGIES    No Known Allergies    SOCIAL & FAMILYHISTORY    Social History     Socioeconomic History    Marital status: Single     Spouse name: Not on file    Number of children: Not on file    Years of education: Not on file    Highest education level: Not on file   Occupational History    Not on file   Social Needs    Financial resource strain: Not on file    Food insecurity     Worry: Not on file     Inability: Not on file    Transportation needs     Medical: Not on file     Non-medical: Not on file   Tobacco Use    Smoking status: Current Every Day Smoker     Packs/day: 0.50    Smokeless tobacco: Never Used   Substance and Sexual Activity    Alcohol use:  Yes    Drug use: Yes     Types: Marijuana     Comment: daily    Sexual activity: Not on file   Lifestyle    Physical activity     Days per week: Not on file     Minutes per session: Not on file    Stress: Not on file   Relationships    Social connections     Talks on phone: Not on file     Gets together: Not on file     Attends Jainism service: Not on file     Active member of club or organization: Not on file     Attends meetings of clubs or organizations: Not on file     Relationship status: Not on file    Intimate partner violence     Fear of current or ex partner: Not on file     Emotionally abused: Not on file     Physically abused: Not on file     Forced sexual activity: Not on file   Other Topics Concern    Not on file   Social History Narrative    Not on file     No family history on file. PHYSICAL EXAM    VITAL SIGNS: BP (!) 103/54   Pulse 77   Temp 98.8 °F (37.1 °C) (Oral)   Resp 16   Ht 5' 4\" (1.626 m)   Wt 115 lb (52.2 kg)   SpO2 98%   BMI 19.74 kg/m²   Constitutional:  Well developed, well nourished, no acute distress-patient sleeping at time of my initial examination  Eyes:  EOMI. PERRL, conjunctiva normal   HENT:  Atraumatic, external ears normal, nose normal   Neck/Lymphatics: supple, no JVD, no swollen nodes. No thyromegaly or thyroid nodules. Respiratory:  No respiratory distress, normal breath sounds  Cardiovascular:  Normal rate, normal rhythm, no murmurs  GI:  Soft, nondistended, normal bowel sounds, nontender  Musculoskeletal:  No edema, no acute deformities   Integument:  Well hydrated   Neurologic:  Awake alert and oriented, no slurred speech, normal gross motor coordination and strength   Psychiatric: Patient sleeping, upon awakening, patient is calm however does not cooperate with my interview questions. No obvious coordination or neurological deficit.   Patient goes back to sleep after my exam.      LABS:  Results for orders placed or performed during the hospital encounter of 08/23/20   Ethanol   Result Value Ref Range    Alcohol Scrn <0.01 <1.10 %WT/VOL   Salicylate   Result Value Ref Range    Salicylate Lvl 0.6 (L) 15 - 30 MG/DL    DOSE AMOUNT DOSE AMT. GIVEN - Unknown     DOSE TIME DOSE TIME GIVEN - Unknown    Acetaminophen Level   Result Value Ref Range    Acetaminophen Level <5.0 (L) 15 - 30 ug/ml    DOSE AMOUNT DOSE AMT.  GIVEN - UNKNOWN     DOSE TIME DOSE TIME GIVEN - UNKNOWN    Urine Drug Screen   Result Value Ref Range    Cannabinoid Scrn, Ur NEGATIVE NEGATIVE    Amphetamines NEGATIVE NEGATIVE    Cocaine Metabolite NEGATIVE NEGATIVE    Benzodiazepine Screen, Urine NEGATIVE NEGATIVE    Barbiturate Screen, Ur NEGATIVE NEGATIVE    Opiates, Urine NEGATIVE NEGATIVE    Phencyclidine, Urine NEGATIVE NEGATIVE    Oxycodone NEGATIVE NEGATIVE   CBC Auto Differential   Result Value Ref Range    WBC 7.2 4.0 - 10.5 K/CU MM    RBC 4.41 4.2 - 5.4 M/CU MM    Hemoglobin 13.1 12.5 - 16.0 GM/DL    Hematocrit 40.0 37 - 47 %    MCV 90.7 78 - 100 FL    MCH 29.7 27 - 31 PG    MCHC 32.8 32.0 - 36.0 %    RDW 12.6 11.7 - 14.9 %    Platelets 576 029 - 889 K/CU MM    MPV 10.0 7.5 - 11.1 FL    Differential Type AUTOMATED DIFFERENTIAL     Segs Relative 51.8 36 - 66 %    Lymphocytes % 33.5 24 - 44 %    Monocytes % 9.4 (H) 0 - 4 %    Eosinophils % 4.3 (H) 0 - 3 %    Basophils % 0.6 0 - 1 %    Segs Absolute 3.7 K/CU MM    Lymphocytes Absolute 2.4 K/CU MM    Monocytes Absolute 0.7 K/CU MM    Eosinophils Absolute 0.3 K/CU MM    Basophils Absolute 0.0 K/CU MM    Nucleated RBC % 0.0 %    Total Nucleated RBC 0.0 K/CU MM    Total Immature Neutrophil 0.03 K/CU MM    Immature Neutrophil % 0.4 0 - 0.43 %   Comprehensive Metabolic Panel w/ Reflex to MG   Result Value Ref Range    Sodium 137 135 - 145 MMOL/L    Potassium 4.0 3.5 - 5.1 MMOL/L    Chloride 103 99 - 110 mMol/L    CO2 27 21 - 32 MMOL/L    BUN 5 (L) 6 - 23 MG/DL    CREATININE 0.8 0.6 - 1.1 MG/DL    Glucose 90 70 - 99 MG/DL    Calcium 9.0 8.3 - 10.6 MG/DL    Alb 4.1 3.4 - 5.0 GM/DL    Total Protein 6.3 (L) 6.4 - 8.2 GM/DL    Total Bilirubin 0.4 0.0 - 1.0 MG/DL    ALT 8 (L) 10 - 40 U/L    AST

## 2020-08-23 NOTE — ED PROVIDER NOTES
insecurity     Worry: Not on file     Inability: Not on file    Transportation needs     Medical: Not on file     Non-medical: Not on file   Tobacco Use    Smoking status: Current Every Day Smoker     Packs/day: 0.50    Smokeless tobacco: Never Used   Substance and Sexual Activity    Alcohol use: Yes    Drug use: Yes     Types: Marijuana     Comment: daily    Sexual activity: Not on file   Lifestyle    Physical activity     Days per week: Not on file     Minutes per session: Not on file    Stress: Not on file   Relationships    Social connections     Talks on phone: Not on file     Gets together: Not on file     Attends Evangelical service: Not on file     Active member of club or organization: Not on file     Attends meetings of clubs or organizations: Not on file     Relationship status: Not on file    Intimate partner violence     Fear of current or ex partner: Not on file     Emotionally abused: Not on file     Physically abused: Not on file     Forced sexual activity: Not on file   Other Topics Concern    Not on file   Social History Narrative    Not on file       PHYSICAL EXAM    VITAL SIGNS: There were no vitals taken for this visit. Constitutional:  Well developed, well nourished, no acute distress, non-toxic appearance   Eyes:  PERRL, EOMI. Conjunctiva normal.  HENT:  Atraumatic, external ears normal, nose normal, oropharynx moist. Neck- supple   Respiratory:  Respirations unlabored. Musculoskeletal:  No deformities. Integument:  Well hydrated. Neurologic:  Alert and oriented. Psychiatric:  Agitated, paranoid. RADIOLOGY/PROCEDURES    Labs Reviewed   ETHANOL   SALICYLATE LEVEL   ACETAMINOPHEN LEVEL   URINE DRUG SCREEN   CBC WITH AUTO DIFFERENTIAL   COMPREHENSIVE METABOLIC PANEL W/ REFLEX TO MG FOR LOW K   HCG, SERUM, QUALITATIVE       ED COURSE & MEDICAL DECISION MAKING    Pertinent Labs & Imaging studies reviewed.  (See chart for details)  -  Patient seen and evaluated in the emergency department. -  Triage and nursing notes reviewed and incorporated. -  Old chart records reviewed and incorporated. -  Supervising physician was Dr. Gunner Andersen. Patient was seen independently. -  Differential diagnosis includes: depression, suicidal, behavior disorder, schizophrenia, schizoaffective disorder, manic, dementia, delirium, alcohol intoxication, drug toxicity, and others  -  Work-up included: see above  -  Patient locked herself in the bathroom while giving a urine specimen but continued to yell to staff through the door about \"dirty pussies\" and STDs. Became more and more disruptive and uncooperative so she was chemically sedated with Benadryl, Haldol, and Ativan. Patient will be signed out to the oncoming provider. Please see their note for further details, including disposition. In light of current events, I did utilize appropriate PPE (including surgical face mask, safety glasses, and gloves, as recommended by the health facility/national standard best practice, during my bedside interactions with the patient. FINAL IMPRESSION    1. Agitation    2.  Visual hallucinations                Joann Kirk PA-C  08/30/20 1784 [facility]  08/30/20 2707

## 2020-08-23 NOTE — ED TRIAGE NOTES
Pt arrives via [facility]. Pt was found in someone else's yard talking to people that weren't there. Pt is verbally abusive to staff and Police officers that accompanied pt. Patient is yelling and saying things that don't make any sense. Patient is pink slipped by [employer].  Patient refused vitals at this time

## 2020-08-24 LAB — SARS-COV-2, NAAT: NOT DETECTED

## 2020-08-24 PROCEDURE — U0002 COVID-19 LAB TEST NON-CDC: HCPCS

## 2020-08-24 PROCEDURE — 94761 N-INVAS EAR/PLS OXIMETRY MLT: CPT

## 2020-08-24 PROCEDURE — 6360000002 HC RX W HCPCS: Performed by: EMERGENCY MEDICINE

## 2020-08-24 RX ORDER — HALOPERIDOL 5 MG/ML
INJECTION INTRAMUSCULAR
Status: DISPENSED
Start: 2020-08-24 | End: 2020-08-24

## 2020-08-24 RX ORDER — DIPHENHYDRAMINE HYDROCHLORIDE 50 MG/ML
25 INJECTION INTRAMUSCULAR; INTRAVENOUS ONCE
Status: DISCONTINUED | OUTPATIENT
Start: 2020-08-24 | End: 2020-08-26 | Stop reason: HOSPADM

## 2020-08-24 RX ORDER — LORAZEPAM 2 MG/ML
2 INJECTION INTRAMUSCULAR ONCE
Status: COMPLETED | OUTPATIENT
Start: 2020-08-24 | End: 2020-08-24

## 2020-08-24 RX ORDER — LORAZEPAM 2 MG/ML
2 INJECTION INTRAMUSCULAR ONCE
Status: COMPLETED | OUTPATIENT
Start: 2020-08-24 | End: 2020-08-25

## 2020-08-24 RX ORDER — DIPHENHYDRAMINE HYDROCHLORIDE 50 MG/ML
INJECTION INTRAMUSCULAR; INTRAVENOUS
Status: DISPENSED
Start: 2020-08-24 | End: 2020-08-24

## 2020-08-24 RX ORDER — HALOPERIDOL 5 MG/ML
2 INJECTION INTRAMUSCULAR ONCE
Status: COMPLETED | OUTPATIENT
Start: 2020-08-24 | End: 2020-08-24

## 2020-08-24 RX ORDER — HALOPERIDOL 5 MG/ML
5 INJECTION INTRAMUSCULAR ONCE
Status: COMPLETED | OUTPATIENT
Start: 2020-08-24 | End: 2020-08-25

## 2020-08-24 RX ORDER — LORAZEPAM 2 MG/ML
INJECTION INTRAMUSCULAR
Status: DISPENSED
Start: 2020-08-24 | End: 2020-08-24

## 2020-08-24 RX ADMIN — HALOPERIDOL LACTATE 2 MG: 5 INJECTION, SOLUTION INTRAMUSCULAR at 13:36

## 2020-08-24 RX ADMIN — LORAZEPAM 2 MG: 2 INJECTION, SOLUTION INTRAMUSCULAR; INTRAVENOUS at 13:36

## 2020-08-24 NOTE — ED PROVIDER NOTES
eMERGENCY dEPARTMENT eNCOUnter    ATTENDING SIGN OUT NOTE    HPI/Physical Exam/Medical Decision Making  Time: 14:00  I have received sign out of [facility]  Emergency Department care from Dr. Bonny Quigley. We discussed the history, physical exam, completed/pending test results (if obtained) and current treatment plan. Please refer to his/her chart for further details. In brief, the patient is a 26 y.o. female who presented to the ED with psychosis. She is reported to me is medically clear. She was accepted to [facility], but became agitated and had to be sedated. [facility] will not take her due to her needing to be sedated. She later became agitated again and would not de-escalate with verbal methods and was medically sedated again. She is currently calm and awaiting placement. 22:30  I have signed out [facility]  Emergency Department care to Dr. Sara Noel. We discussed the history, physical exam, completed/pending test results (if obtained) and current treatment plan. Please refer to his/her chart for further details, remaining Emergency Department course, final disposition and diagnosis. Diagnostics:  Labs Reviewed   SALICYLATE LEVEL - Abnormal; Notable for the following components:       Result Value    Salicylate Lvl 0.6 (*)     All other components within normal limits   ACETAMINOPHEN LEVEL - Abnormal; Notable for the following components:    Acetaminophen Level <5.0 (*)     All other components within normal limits   CBC WITH AUTO DIFFERENTIAL - Abnormal; Notable for the following components:    Monocytes % 9.4 (*)     Eosinophils % 4.3 (*)     All other components within normal limits   COMPREHENSIVE METABOLIC PANEL W/ REFLEX TO MG FOR LOW K - Abnormal; Notable for the following components:    BUN 5 (*)     Total Protein 6.3 (*)     ALT 8 (*)     All other components within normal limits   ETHANOL   URINE DRUG SCREEN   HCG, SERUM, QUALITATIVE   COVID-19       Clinical Impression:  1. Agitation    2. Visual hallucinations        Comment: Please note this report has been produced using speech recognition software and may contain errors related to that system including errors in grammar, punctuation, and spelling, as well as words and phrases that may be inappropriate. If there are any questions or concerns please feel free to contact the dictating provider for clarification.         Dulce Harman MD  08/25/20 0837

## 2020-08-24 NOTE — ED PROVIDER NOTES
Patient signed out to me by Dr. Lazara Ryan,    25yof with psychosis, was medically cleared and accepted to [facility], they then refused to take her if she required medicated overnight. She was refusing to go to [facility], became agitated, wants to go to [place]. She was verbally de-escalated. Awaiting placement/plan. Antonella Raphael MD  08/24/20 1124        Patient is now upset that she might be transferred to [facility], they are requiring COVID screening before transfer, she states this is all alive and that Fabiola Martel is no longer orange, she is agitated. We will need to medicate her at this point, we have delayed care multiple times at this point due to her psychosis and she is not compliant and not de-escalating via verbal  methods. Plan for Haldol and Ativan, COVID testing and hopefully psych placement     Antonella Raphael MD  08/24/20 1325        Patient awaiting placement, signed out to Dr. Nena Cason. Antonella Raphael MD  08/24/20 1402          Addendum- patient was signed back out to me this morning by Dr. Anai Chand, accepted back to [facility]. Transport arranged at 130pm.      Antonella Raphael MD  08/25/20 1009        Apparently patient's insurance had arranged transport by a Lyft vehicle, this is not appropriate, we have arranged transport but she is continued to escalate, she is screaming, disruptive, attempting to come out into the hallway, actively psychotic, we are now medicating her. Transport is coming hopefully within the next hour.      Antonella Raphael MD  08/25/20 3331

## 2020-08-25 VITALS
WEIGHT: 115 LBS | DIASTOLIC BLOOD PRESSURE: 54 MMHG | BODY MASS INDEX: 19.63 KG/M2 | OXYGEN SATURATION: 98 % | HEART RATE: 77 BPM | SYSTOLIC BLOOD PRESSURE: 103 MMHG | RESPIRATION RATE: 16 BRPM | HEIGHT: 64 IN | TEMPERATURE: 98.8 F

## 2020-08-25 PROCEDURE — 6370000000 HC RX 637 (ALT 250 FOR IP): Performed by: EMERGENCY MEDICINE

## 2020-08-25 PROCEDURE — 6360000002 HC RX W HCPCS: Performed by: EMERGENCY MEDICINE

## 2020-08-25 RX ORDER — LORAZEPAM 1 MG/1
2 TABLET ORAL ONCE
Status: COMPLETED | OUTPATIENT
Start: 2020-08-25 | End: 2020-08-25

## 2020-08-25 RX ADMIN — LORAZEPAM 2 MG: 2 INJECTION, SOLUTION INTRAMUSCULAR; INTRAVENOUS at 15:20

## 2020-08-25 RX ADMIN — HALOPERIDOL LACTATE 5 MG: 5 INJECTION, SOLUTION INTRAMUSCULAR at 15:20

## 2020-08-25 RX ADMIN — LORAZEPAM 2 MG: 1 TABLET ORAL at 19:54

## 2020-08-25 NOTE — ED PROVIDER NOTES
This patient was signed out to me by Dr. David Beck. In short patient presents with psychosis. Patient was deemed medically stable. She was accepted to [facility] but then the patient became agitated and they decided that they would no longer take her. On my evaluation patient is calm and has been sleeping throughout my care the patient. Vital signs are stable. No abdominal tenderness lungs are clear to auscultation bilaterally and heart rate is normal.  Patient is currently awaiting placement. This patient was handed to Dr. Bartolo Cast. We did discuss the patient's case. Plan and disposition was discussed.      Krystin Torre, DO  08/25/20 5250

## 2020-08-25 NOTE — FLOWSHEET NOTE
PT has not been redirectable for the last few hours, will not stop yelling, keeps coming to entry way and coming too far out. Will only occasionally go back into room when asked. PT being verbally agressive towards staff.  Accusing staff of stealing drugs, shooting her, etc.

## 2020-08-26 NOTE — ED PROVIDER NOTES
276 140 - 440 K/CU MM    MPV 10.0 7.5 - 11.1 FL    Differential Type AUTOMATED DIFFERENTIAL     Segs Relative 51.8 36 - 66 %    Lymphocytes % 33.5 24 - 44 %    Monocytes % 9.4 (H) 0 - 4 %    Eosinophils % 4.3 (H) 0 - 3 %    Basophils % 0.6 0 - 1 %    Segs Absolute 3.7 K/CU MM    Lymphocytes Absolute 2.4 K/CU MM    Monocytes Absolute 0.7 K/CU MM    Eosinophils Absolute 0.3 K/CU MM    Basophils Absolute 0.0 K/CU MM    Nucleated RBC % 0.0 %    Total Nucleated RBC 0.0 K/CU MM    Total Immature Neutrophil 0.03 K/CU MM    Immature Neutrophil % 0.4 0 - 0.43 %   Comprehensive Metabolic Panel w/ Reflex to MG   Result Value Ref Range    Sodium 137 135 - 145 MMOL/L    Potassium 4.0 3.5 - 5.1 MMOL/L    Chloride 103 99 - 110 mMol/L    CO2 27 21 - 32 MMOL/L    BUN 5 (L) 6 - 23 MG/DL    CREATININE 0.8 0.6 - 1.1 MG/DL    Glucose 90 70 - 99 MG/DL    Calcium 9.0 8.3 - 10.6 MG/DL    Alb 4.1 3.4 - 5.0 GM/DL    Total Protein 6.3 (L) 6.4 - 8.2 GM/DL    Total Bilirubin 0.4 0.0 - 1.0 MG/DL    ALT 8 (L) 10 - 40 U/L    AST 15 15 - 37 IU/L    Alkaline Phosphatase 54 40 - 129 IU/L    GFR Non-African American >60 >60 mL/min/1.73m2    GFR African American >60 >60 mL/min/1.73m2    Anion Gap 7 4 - 16   HCG Serum, Qualitative   Result Value Ref Range    hCG Qual NEGATIVE    COVID-19    Specimen: Nasopharyngeal Swab   Result Value Ref Range    SARS-CoV-2, NAAT NOT DETECTED       Radiographs (if obtained):    [] Radiologist's Report Reviewed:  No orders to display       MDM:      Clinical Impression:  1. Agitation    2. Visual hallucinations      Disposition referral (if applicable):  No follow-up provider specified. Disposition medications (if applicable):  New Prescriptions    No medications on file       Comment: Please note this report has been produced using speech recognition software and may contain errors related to that system including errors in grammar, punctuation, and spelling, as well as words and phrases that may be inappropriate. Efforts were made to edit the dictations.        Criselda Weeks, DO  08/26/20 0002

## 2020-08-26 NOTE — ED NOTES
0600 - 0630:  Patient has been accepted @ [facility] by [employer], will be Involuntary - PINK SLIP; on the ITU Unit. Please:     -  Provide Nurse to Nurse Report     - Fax PINK SLIP     - Arrange Transport.      Khloe Domínguez RN  80/56/16 5665
1:1  remains in the room at this time  . Room has been checked and safety measures remain in place. Pt resting eyes closed at this time.      Alfredo Baker RN  08/23/20 1787
[address] [place] [address] Drive, [address] [place] [address]  68/76/01 7116
Asleep in bed     Jayce Becker, [address] [facility]  08/23/20 9139
Assumed care of the patient at this time. Patient is in her street clothes and has all personal belongings with her. Patient asked several time and patient became belligerent and finally changed into a green gown. Patient belongings were given to security as per protocol.       Sabine Lopze RN  08/23/20 7425
Bed: ED-30  Expected date:   Expected time:   Means of arrival:   Comments:  RAMYA Garcia  08/23/20 7515
Called MAC, spoke with Joan Chandler RN, there was some apparent confusion regarding patient's acceptance to [facility] are at capacity. Joan Chandler RN @ [facility] [place] [facility]up [facility] to see if patient may still be accepted there and  call me back.      Tanya Lee RN  81/74/03 0115
Continues to rest with eyes closed. No abnormal behavior observed. Sitter at bedside.       Maribel Mendoza RN  08/24/20 1928
Continues to rest with eyes closed. No further behaviors observed. Sitter at bedside.       Castillo Hutson RN  08/24/20 7844
Dr Kalyn Sims approved rapid covid test for [address] [facility].      [facility],   08/24/20 4342
Hot meal, juice, jello and pudding provided upon request. Warm blankets also provided. Patient resting at this time.       Bonnie Davila  08/23/20 2122
Jayne case management assisting to call [facility] for possible admission for [address] [place] [address]  08/24/20 3981
MED TRANS ETA 0000     Suad Short  08/25/20 1900
Meal tray ordered, regular diet on a safety tray.      Emil Alvarez  08/24/20 2345
Meal tray ordered, regular diet on a safety tray.      Olita Risk  08/24/20 1149
Med Trans here for transport to [facility]. Pt awakened to change into another gown. Pt begins yelling out and cursing staff. States \"Im not going anywhere! \". Dr Kennedi Torres gives order to assist Pt in calming down. [facility] notified of Pt current status. [facility] states they can not accept Pt if she is going to bed medicated. Dr Kennedi Torres spoke with [facility] regarding this matter. Pt to remain in this ER at this time. Pt instantly calms down after being made aware that she is not leaving. Pt closes eyes and returns to resting state. Pt now has transport set up for 12pm via Med Trans. Sitter remains at bedside.       Dominga Alcantar RN  08/24/20 7274
Medtrans 0130     Mirta Martinez  08/23/20 9949
Medtrans 12pm to [facility]     Mirta Vergara  08/24/20 0130
No abnormal behavior observed. Sitter at bedside.       Leopold Millard, RN  08/24/20 1831
Patient awake and up and walking to the restroom.      Milly Gilliam RN  08/25/20 0003
Patient changed into blue gown for transport. Patient refusing to go with transport team. Becoming very agitated and yelling at staff.       Bonnie Davila  08/24/20 0112
Patient continues to  room and speak with people who are not there. Patient is verbally abusive towards this tech.      Lashell Lyons  08/25/20 7648
Patient is very verbal and shouting out obscenities. The patient was redirected into her room and was cooperative.        Alan Nicole RN  08/25/20 2285
Patient remains on suicide precautions. 1:1  remains in the room at this time  . Room has been checked and safety measures remain in place. Pt resting eyes closed at this time.      Kimberley Denson RN  08/23/20 2907
Patient remains on suicide precautions. 1:1  remains in the room at this time  . Room has been checked and safety measures remain in place. Pt resting eyes closed at this time. Mireya Toscano RN  08/23/20 7697
Patient remains resting on cot without distress or needs noted. Sitter at the bedside. [address]  Pauly Carballo RN  08/24/20 1825
Patient signed out to me by Dr. Osman Landrum,    25yof with psychosis, awaiting MH placement, has required meds for sedation, received geodon and ativan in time since I last saw her yesterday.       Juan Logan MD  08/25/20 5280        Accepted back to [facility], transport arranged for 130pm.      Juan Logan MD  08/25/20 6419
Pt attempting to climb out of bed again while nurse is sitting in room with pt. Pt redirected and states he will stay in bed but remains confused.       Merissa Tucker RN  08/24/20 5908
Pt awake and escorted to the bathroom by this tech. Pt provided with warm blankets and ice water.       Aquilino Torres  08/25/20 0119
Pt continues to rest with eyes closed. No abnormal behavior observed. Sitter remains at bedside.       Torres Weiner RN  08/23/20 5000
Pt has calmed down, asleep in bed, able to get vitals.  Belongings are with Jamel Caldera RN  08/23/20 4881
Pt refused at [facility] due to no beds available and [facility] unable to call [facility].  This nurse to call MOLINA Rollins  08/24/20 7542
Pt refusing covid and to be admitted for [facility] at this time. Pt is pink slipped. Spoke with Dr Sandra Alcala and pt to be medicated and must have covid. Security called to assist. And after getting medication pt walking out of room and to bathroom but slammed door in sitter's face. Security arrived. Pt out of restroom and back to room. Pt changed out of blue gown and back into green gown by security female officer. Pt screaming in nurse's face and cussing. Security officers into room and pt laid onto cot and allowed nurse to give injection as ordered, cussing. Pt also swabbed for rapid covid test per other nurse and swab sent to lab per [facility]. Sitter remains at bedside.       Huyen Mcfarland RN  08/24/20 4145
Pt resting at this time eyes closed. No acute distress noted. Green gown remains in place, 1:1 continuous monitoring maintained. Sitter at the bedside.  Will continue to monitor     Mariana Pedro RN  08/23/20 2696
Pt resting at this time eyes closed. No acute distress noted. Green gown remains in place, 1:1 continuous monitoring maintained. Sitter at the bedside.  Will continue to monitor     Sabine Lopez RN  08/23/20 4855
Pt resting in bed with eyes closed. No abnormal behavior observed. Sitter remains at bedside.       Albino Hernandez RN  08/23/20 3791
Pt resting in bed with eyes closed. Sitter at bedside.       Fernando Davis RN  08/24/20 8284
Report from [facility]. Pt resting quietly on cot with eyes closed, sitter at bedside.  Waiting for transport at [facility]  08/24/20 4435
Report given to [facility].      Donald Noriega RN  08/25/20 3562
Report received from [address] [place] [address],[address], RN       . Care assumed at this time. Patient resting quietly in bed with NAD noted. Resps even and unlabored.       John Hull, MOLINA  08/23/20 49 Opal Ortiz RN  08/23/20 4117
Report received from Jennifer Dwyer RN  08/24/20 5360
Report received per Harini So RN, Patient resting on cot with eyes closed. Sitter at the bedside. Room inspected for safety. [facility] 64  Edie Villalobos RN  08/24/20 1733
Report to [facility] RN assuming care.       Huyen Mcfarland RN  08/24/20 06
Report was given to [facility] facility.       Michelle Cotton RN  08/25/20 7530
Resp even and unlabored. No abnormal behaviors observed. Sitter at bedside.       Fernando Davis RN  08/24/20 8121
Resting with eyes closed. Sitter at bedside.       Juan Luis Corrales RN  08/24/20 6383
Returning from lunch. After walking back out to unit pt had locked self in bathroom. Pt screaming and cussing at staff. Not making sense. SPD at bedside, pt being monitored. Is pink slipped.      Robin Larson RN  08/23/20 4640
Rounding of the patient was done and the patient was awake and agitated  in the doorway and yelling out. The patient constant observer is at bedside and has no concerns of patient safety at the present. Every 15 minute visual checks continued.      Tanisha Gamez RN  08/25/20 4118
Rounding of the patient was done and the patient was awake and agitated in the bed. The patient constant observer is at bedside and has no concerns of patient safety. Every 15 minute visual checks continued.      Ludmila Garcia RN  08/25/20 1313
Rounding of the patient was done and the patient was awake and very verbal but redirectable in the doorway. The patient constant observer is at bedside and has some concerns of patient safety. The patient is yelling out but not aggressive. Every 15 minute visual checks continued.      Olga Sebastian RN  08/25/20 7644
Rounding of the patient was done and the patient was resting in the bed. The patient constant observer is at bedside and has no concerns of patient safety. Every 15 minute visual checks continued.      Donald Noriega RN  08/25/20 2992
Sleeping quietly',     [facility]  85/51/93 8073
Sleeping soundly,     [facility]  09/81/52 5814
Sleeping soundly.      Jasmina Boyd RN  14/90/60 6580
Sleeping soundly. Kandi Martins RN  33/32/01 1537
Sound asleep.      Tiffanie Zarate RN  34/17/36 2285
Spoke to [place] at [facility] of psychiatry     Uri Marinelli RN  08/26/20 0030
[facility] with Dr [facility]. Pt at this time refusing oral ativan and states she will not go to [facility], will only go to [facility] because she has been there before and they are familiar with her/help her. Pt states she will act out again and refuses to go to [facility]. Dr Elvan Cranker aware and states pt does not need repeat MH eval but does need placement. [address] assisted with placement and set up transport for pt on this visit. MAC notified of update and per [facility] and transport to be updated and will check with [facility] for admission.       Dennis Grier RN  08/24/20 5844
TRANSPORT ETA 2000     Suad Archer Freeze  08/25/20 0105
TRANSPORT ETA [address] [facility] Stanton Ortiz  08/25/20 2497
Talked to cm about admission, pt refused [facility], no beds available now, pt will not be admitted until tomorrow      Dexter Torres RN  08/24/20 1946
This nurse called Jayleen Ruggiero for transport to [facility]. Transport scheduled for 1330.  Confirmation # [id number] [place], RN  08/25/20 0628
This nurse once again spoke with [employer] concerning the correct transportation not arriving for patient. This time a young lady in a [facility] arrived, not medical transport. I told her were not able to accept the transport due to safety reasons for her and the patient. Once again, transportation is being set up with an ETA of 2000.      Martha Jordan RN  08/25/20 8675
This nurse spoke with Yulissa Vera, concerning that the patient has not been picked up. Per [employer] a [place] ride was arranged for the patient, I explained that this was not appropriate for this patient. Transportation was changed to an appropriate method of transport, ETA 1600.      Luciano Coulter, RN  08/25/20 0193
To ct with ct martha Mcfarland RN  08/24/20 6280
WAITING ON TRANSPORT MARISELA Salazar.  Abi Callahan  08/25/20 1829
Will obtain vitals when pt is calmed down and cooperative      Vasile [place], [place]  08/23/20 3520
Behavior:     Verbal: None stated. Attempt: None stated. Past Suicidal Behavior:     Verbal: None stated. Attempt: None stated. Self-Injurious/Self-Mutilation: Unknown    Trauma Identified: Not shared. Protective Factors:    None are identified at this time. Risk Factors:    Possible Psychosis    Clinical Summary:    As above. Seeking placement.     Electronically signed by Charline Samson RN on 4/85/4330 at 8:17 PM     Charline Samson RN  36/25/81 6038

## 2020-09-03 ENCOUNTER — HOSPITAL ENCOUNTER (EMERGENCY)
Age: 25
Discharge: HOME | End: 2020-09-03
Attending: EMERGENCY MEDICINE
Payer: MEDICAID

## 2020-09-03 VITALS
HEIGHT: 64 IN | BODY MASS INDEX: 19.63 KG/M2 | SYSTOLIC BLOOD PRESSURE: 108 MMHG | OXYGEN SATURATION: 97 % | DIASTOLIC BLOOD PRESSURE: 60 MMHG | HEART RATE: 95 BPM | RESPIRATION RATE: 16 BRPM | TEMPERATURE: 98.4 F | WEIGHT: 115 LBS

## 2020-09-03 LAB
ACETAMINOPHEN LEVEL: <5 UG/ML (ref 15–30)
ALBUMIN SERPL-MCNC: 4.8 GM/DL (ref 3.4–5)
ALCOHOL SCREEN SERUM: <0.01 %WT/VOL
ALP BLD-CCNC: 57 IU/L (ref 40–129)
ALT SERPL-CCNC: 10 U/L (ref 10–40)
AMPHETAMINES: ABNORMAL
ANION GAP SERPL CALCULATED.3IONS-SCNC: 16 MMOL/L (ref 4–16)
AST SERPL-CCNC: 14 IU/L (ref 15–37)
BACTERIA: ABNORMAL /HPF
BARBITURATE SCREEN URINE: NEGATIVE
BASOPHILS ABSOLUTE: 0.1 K/CU MM
BASOPHILS RELATIVE PERCENT: 0.5 % (ref 0–1)
BENZODIAZEPINE SCREEN, URINE: NEGATIVE
BILIRUB SERPL-MCNC: 1.1 MG/DL (ref 0–1)
BILIRUBIN URINE: NEGATIVE MG/DL
BLOOD, URINE: ABNORMAL
BUN BLDV-MCNC: 15 MG/DL (ref 6–23)
CALCIUM SERPL-MCNC: 9.7 MG/DL (ref 8.3–10.6)
CANNABINOID SCREEN URINE: ABNORMAL
CHLORIDE BLD-SCNC: 100 MMOL/L (ref 99–110)
CLARITY: ABNORMAL
CO2: 23 MMOL/L (ref 21–32)
COCAINE METABOLITE: ABNORMAL
COLOR: ABNORMAL
CREAT SERPL-MCNC: 0.8 MG/DL (ref 0.6–1.1)
DIFFERENTIAL TYPE: ABNORMAL
DOSE AMOUNT: ABNORMAL
DOSE AMOUNT: ABNORMAL
DOSE TIME: ABNORMAL
DOSE TIME: ABNORMAL
EOSINOPHILS ABSOLUTE: 0.3 K/CU MM
EOSINOPHILS RELATIVE PERCENT: 2.1 % (ref 0–3)
GFR AFRICAN AMERICAN: >60 ML/MIN/1.73M2
GFR NON-AFRICAN AMERICAN: >60 ML/MIN/1.73M2
GLUCOSE BLD-MCNC: 89 MG/DL (ref 70–99)
GLUCOSE, URINE: NEGATIVE MG/DL
HCG QUALITATIVE: NEGATIVE
HCT VFR BLD CALC: 44.8 % (ref 37–47)
HEMOGLOBIN: 14.7 GM/DL (ref 12.5–16)
IMMATURE NEUTROPHIL %: 0.3 % (ref 0–0.43)
KETONES, URINE: ABNORMAL MG/DL
LEUKOCYTE ESTERASE, URINE: ABNORMAL
LYMPHOCYTES ABSOLUTE: 3.3 K/CU MM
LYMPHOCYTES RELATIVE PERCENT: 23.6 % (ref 24–44)
MCH RBC QN AUTO: 29.8 PG (ref 27–31)
MCHC RBC AUTO-ENTMCNC: 32.8 % (ref 32–36)
MCV RBC AUTO: 90.7 FL (ref 78–100)
MONOCYTES ABSOLUTE: 1.1 K/CU MM
MONOCYTES RELATIVE PERCENT: 8.1 % (ref 0–4)
MUCUS: ABNORMAL HPF
NITRITE URINE, QUANTITATIVE: POSITIVE
NUCLEATED RBC %: 0 %
OPIATES, URINE: NEGATIVE
OXYCODONE: NEGATIVE
PDW BLD-RTO: 12.6 % (ref 11.7–14.9)
PH, URINE: 5 (ref 5–8)
PHENCYCLIDINE, URINE: NEGATIVE
PLATELET # BLD: 298 K/CU MM (ref 140–440)
PMV BLD AUTO: 9.9 FL (ref 7.5–11.1)
POTASSIUM SERPL-SCNC: 4.1 MMOL/L (ref 3.5–5.1)
PROTEIN UA: 30 MG/DL
RBC # BLD: 4.94 M/CU MM (ref 4.2–5.4)
RBC URINE: 43 /HPF (ref 0–6)
SALICYLATE LEVEL: <0.3 MG/DL (ref 15–30)
SEGMENTED NEUTROPHILS ABSOLUTE COUNT: 9 K/CU MM
SEGMENTED NEUTROPHILS RELATIVE PERCENT: 65.4 % (ref 36–66)
SODIUM BLD-SCNC: 139 MMOL/L (ref 135–145)
SPECIFIC GRAVITY UA: 1.02 (ref 1–1.03)
SQUAMOUS EPITHELIAL: 20 /HPF
TOTAL IMMATURE NEUTOROPHIL: 0.04 K/CU MM
TOTAL NUCLEATED RBC: 0 K/CU MM
TOTAL PROTEIN: 7.6 GM/DL (ref 6.4–8.2)
TRICHOMONAS: ABNORMAL /HPF
UROBILINOGEN, URINE: NORMAL MG/DL (ref 0.2–1)
WBC # BLD: 13.8 K/CU MM (ref 4–10.5)
WBC UA: 224 /HPF (ref 0–5)

## 2020-09-03 PROCEDURE — 80307 DRUG TEST PRSMV CHEM ANLYZR: CPT

## 2020-09-03 PROCEDURE — G0480 DRUG TEST DEF 1-7 CLASSES: HCPCS

## 2020-09-03 PROCEDURE — 84703 CHORIONIC GONADOTROPIN ASSAY: CPT

## 2020-09-03 PROCEDURE — 36415 COLL VENOUS BLD VENIPUNCTURE: CPT

## 2020-09-03 PROCEDURE — 99285 EMERGENCY DEPT VISIT HI MDM: CPT

## 2020-09-03 PROCEDURE — 80053 COMPREHEN METABOLIC PANEL: CPT

## 2020-09-03 PROCEDURE — 96372 THER/PROPH/DIAG INJ SC/IM: CPT

## 2020-09-03 PROCEDURE — 85025 COMPLETE CBC W/AUTO DIFF WBC: CPT

## 2020-09-03 PROCEDURE — 81001 URINALYSIS AUTO W/SCOPE: CPT

## 2020-09-03 PROCEDURE — 6370000000 HC RX 637 (ALT 250 FOR IP): Performed by: EMERGENCY MEDICINE

## 2020-09-03 PROCEDURE — 6360000002 HC RX W HCPCS: Performed by: PHYSICIAN ASSISTANT

## 2020-09-03 RX ORDER — CEPHALEXIN 500 MG/1
500 CAPSULE ORAL 2 TIMES DAILY
Qty: 14 CAPSULE | Refills: 0 | Status: SHIPPED | OUTPATIENT
Start: 2020-09-03 | End: 2020-09-10

## 2020-09-03 RX ORDER — DIPHENHYDRAMINE HYDROCHLORIDE 50 MG/ML
50 INJECTION INTRAMUSCULAR; INTRAVENOUS ONCE
Status: COMPLETED | OUTPATIENT
Start: 2020-09-03 | End: 2020-09-03

## 2020-09-03 RX ORDER — CEPHALEXIN 250 MG/1
500 CAPSULE ORAL EVERY 12 HOURS SCHEDULED
Status: DISCONTINUED | OUTPATIENT
Start: 2020-09-03 | End: 2020-09-03

## 2020-09-03 RX ORDER — CEPHALEXIN 250 MG/1
500 CAPSULE ORAL ONCE
Status: COMPLETED | OUTPATIENT
Start: 2020-09-03 | End: 2020-09-03

## 2020-09-03 RX ORDER — HALOPERIDOL 5 MG/ML
5 INJECTION INTRAMUSCULAR ONCE
Status: COMPLETED | OUTPATIENT
Start: 2020-09-03 | End: 2020-09-03

## 2020-09-03 RX ORDER — LORAZEPAM 2 MG/ML
2 INJECTION INTRAMUSCULAR ONCE
Status: COMPLETED | OUTPATIENT
Start: 2020-09-03 | End: 2020-09-03

## 2020-09-03 RX ADMIN — DIPHENHYDRAMINE HYDROCHLORIDE 50 MG: 50 INJECTION INTRAMUSCULAR; INTRAVENOUS at 11:00

## 2020-09-03 RX ADMIN — LORAZEPAM 2 MG: 2 INJECTION, SOLUTION INTRAMUSCULAR; INTRAVENOUS at 11:00

## 2020-09-03 RX ADMIN — HALOPERIDOL LACTATE 5 MG: 5 INJECTION, SOLUTION INTRAMUSCULAR at 11:00

## 2020-09-03 RX ADMIN — CEPHALEXIN 500 MG: 250 CAPSULE ORAL at 18:43

## 2020-09-03 NOTE — ED NOTES
Patient awakes to stimuli. Patient hungry, sandwich made, chips on bed and water in reach.  Sitter remains at bedside       Ernst Fan RN  09/03/20 7287

## 2020-09-03 NOTE — ED NOTES
Took over for 1:1 reece Bautista. Currently patient lying in bed asleep, respirations is unlabored and normal. Skin pink, dry and warm. Lights are out in room. Completed repeat vitals for [facility]. Mary Arriaga  09/03/20 1242

## 2020-09-03 NOTE — ED PROVIDER NOTES
murmurs  GI:  Soft, nondistended, normal bowel sounds, nontender  Musculoskeletal:  No edema, no acute deformities   Integument:  Well hydrated   Neurologic:  Awake alert and oriented, no slurred speech, normal gross motor coordination and strength   Psychiatric: Impulsive, pressured speech, tangential, withdrawn, poor eye contact, fidgety.       LABS:  .  Results for orders placed or performed during the hospital encounter of 09/03/20   CBC auto diff   Result Value Ref Range    WBC 13.8 (H) 4.0 - 10.5 K/CU MM    RBC 4.94 4.2 - 5.4 M/CU MM    Hemoglobin 14.7 12.5 - 16.0 GM/DL    Hematocrit 44.8 37 - 47 %    MCV 90.7 78 - 100 FL    MCH 29.8 27 - 31 PG    MCHC 32.8 32.0 - 36.0 %    RDW 12.6 11.7 - 14.9 %    Platelets 224 457 - 531 K/CU MM    MPV 9.9 7.5 - 11.1 FL    Differential Type AUTOMATED DIFFERENTIAL     Segs Relative 65.4 36 - 66 %    Lymphocytes % 23.6 (L) 24 - 44 %    Monocytes % 8.1 (H) 0 - 4 %    Eosinophils % 2.1 0 - 3 %    Basophils % 0.5 0 - 1 %    Segs Absolute 9.0 K/CU MM    Lymphocytes Absolute 3.3 K/CU MM    Monocytes Absolute 1.1 K/CU MM    Eosinophils Absolute 0.3 K/CU MM    Basophils Absolute 0.1 K/CU MM    Nucleated RBC % 0.0 %    Total Nucleated RBC 0.0 K/CU MM    Total Immature Neutrophil 0.04 K/CU MM    Immature Neutrophil % 0.3 0 - 0.43 %   CMP   Result Value Ref Range    Sodium 139 135 - 145 MMOL/L    Potassium 4.1 3.5 - 5.1 MMOL/L    Chloride 100 99 - 110 mMol/L    CO2 23 21 - 32 MMOL/L    BUN 15 6 - 23 MG/DL    CREATININE 0.8 0.6 - 1.1 MG/DL    Glucose 89 70 - 99 MG/DL    Calcium 9.7 8.3 - 10.6 MG/DL    Alb 4.8 3.4 - 5.0 GM/DL    Total Protein 7.6 6.4 - 8.2 GM/DL    Total Bilirubin 1.1 (H) 0.0 - 1.0 MG/DL    ALT 10 10 - 40 U/L    AST 14 (L) 15 - 37 IU/L    Alkaline Phosphatase 57 40 - 129 IU/L    GFR Non-African American >60 >60 mL/min/1.73m2    GFR African American >60 >60 mL/min/1.73m2    Anion Gap 16 4 - 16   HCG Serum, Qualitative   Result Value Ref Range    hCG Qual NEGATIVE Salicylate   Result Value Ref Range    Salicylate Lvl <5.2 (L) 15 - 30 MG/DL    DOSE AMOUNT DOSE AMT. GIVEN - UNKNOWN     DOSE TIME DOSE TIME GIVEN - UNKNOWN    Acetaminophen Level   Result Value Ref Range    Acetaminophen Level <5.0 (L) 15 - 30 ug/ml    DOSE AMOUNT DOSE AMT. GIVEN - UNKNOWN     DOSE TIME DOSE TIME GIVEN - UNKNOWN    Urinalysis (Lab)   Result Value Ref Range    Color, UA TIM (A) YELLOW    Clarity, UA SLIGHTLY CLOUDY (A) CLEAR    Glucose, Urine NEGATIVE NEGATIVE MG/DL    Bilirubin Urine NEGATIVE NEGATIVE MG/DL    Ketones, Urine MODERATE (A) NEGATIVE MG/DL    Specific Gravity, UA 1.020 1.001 - 1.035    Blood, Urine SMALL (A) NEGATIVE    pH, Urine 5.0 5.0 - 8.0    Protein, UA 30 (A) NEGATIVE MG/DL    Urobilinogen, Urine NORMAL 0.2 - 1.0 MG/DL    Nitrite Urine, Quantitative POSITIVE (A) NEGATIVE    Leukocyte Esterase, Urine LARGE (A) NEGATIVE    RBC, UA 43 (H) 0 - 6 /HPF    WBC,  (H) 0 - 5 /HPF    Bacteria, UA MANY (A) NEGATIVE /HPF    Squam Epithel, UA 20 /HPF    Mucus, UA FEW (A) NEGATIVE HPF    Trichomonas, UA NONE SEEN NONE SEEN /HPF   Urine Drug Screen   Result Value Ref Range    Cannabinoid Scrn, Ur UNCONFIRMED POSITIVE (A) NEGATIVE    Amphetamines UNCONFIRMED POSITIVE (A) NEGATIVE    Cocaine Metabolite UNCONFIRMED POSITIVE (A) NEGATIVE    Benzodiazepine Screen, Urine NEGATIVE NEGATIVE    Barbiturate Screen, Ur NEGATIVE NEGATIVE    Opiates, Urine NEGATIVE NEGATIVE    Phencyclidine, Urine NEGATIVE NEGATIVE    Oxycodone NEGATIVE NEGATIVE   Ethanol   Result Value Ref Range    Alcohol Scrn <0.01 <0.01 %WT/VOL       IMAGING:  No results found. ED COURSE & MEDICAL DECISION MAKING      Medical evaluation does demonstrate a likely urinary tract infection is nitrite positive. Also has positive drug screen for marijuana, amphetamines and cocaine.   Patient was initially not cooperative with staff and evaluation, she was given sedation and did begin to comply and act appropriately when drowsy. We were able to feed her. Consultation: Mental health Professional consulted in the emergency Department. See [address] note for details of MH evaluation. Patient recheck reveals patient lying comfortable in exam bed . Eating lunch. Patient was evaluated by mental health professional, after speaking with them at this time they feel that the patient is not demonstrating any significant acute psychosis especially in the setting of methamphetamine abuse. Patient has not done well in the inpatient setting, had a recent admission, is noncompliant with her medication. Patient is adamant that she wants to be discharged, states that she has a safety plan in place, states that she can call her friend at any time. Duane Ramming the mental health infection was not advocating that the patient be admitted in an inpatient unit. At this time we do not feel she is a harm to herself or those around her. I then had a secondary conversation with the patient about possible needs, she again reiterates that she would like to go home, she has a safety plan in place. I do think that her polysubstance abuse has something to do with her moo/pressured speech/insomnia/hallucinations. I do feel that she does need outpatient counseling and/or drug rehab. I did offer this to the patient and she did not want any resources. At this point we will look to discharge her as long as her friend can come and pick her up and she has a safe ride home. She was given information about Rachana Gil, [facility] mental health. We did write her a prescription of Keflex for her urinary tract infection. This plan was reviewed with attending physician and is in agreement. Pt was given substance abuse tx info / referral.     Return to emergency Department precautions were discussed in detail with patient, including worsening or any new symptoms, who understands and agrees. Vital signs and nursing notes reviewed during ED course. Patient care and presentation staffed with supervising MD.   Patient seen by supervising MD today- see his/her note for details of the encounter. Clinical  IMPRESSION    1. Hallucinations    2. Urinary tract infection without hematuria, site unspecified        Comment: Please note this report has been produced using speech recognition software and may contain errors related to that system including errors in grammar, punctuation, and spelling, as well as words and phrases that may be inappropriate. If there are any questions or concerns please feel free to contact the dictating provider for clarification.      LEIGHA Monreal  09/04/20 7312

## 2020-09-03 NOTE — ED PROVIDER NOTES
I independently examined and evaluated [place]. In brief their history revealed patient was brought to the emergency department by [facility] and EMS after being found talking to herself and pacing outside. Patient is talking to herself on arrival with very bizarre thoughts. Patient is perseverating about being in the [employer] and marching. Patient is actively shouting and is hard to redirect on arrival limiting history. Patient denies any explicit suicidal or homicidal ideation. Their focused exam revealed the patient is afebrile, tachycardic but otherwise hemodynamically stable on room air. The patient appears age appropriate, appears well-hydrated, well-nourished. Slightly disheveled. Mucous membranes are moist. Speech is clear. Breathing is unlabored. Speaking clearly in full out sentences. No respiratory distress. Skin is dry. Mental status is with pressured speech, flight of ideas and bizarre thoughts and poor eye contact. The patient moves all extremities and is without facial droop.     Results for orders placed or performed during the hospital encounter of 09/03/20   CBC auto diff   Result Value Ref Range    WBC 13.8 (H) 4.0 - 10.5 K/CU MM    RBC 4.94 4.2 - 5.4 M/CU MM    Hemoglobin 14.7 12.5 - 16.0 GM/DL    Hematocrit 44.8 37 - 47 %    MCV 90.7 78 - 100 FL    MCH 29.8 27 - 31 PG    MCHC 32.8 32.0 - 36.0 %    RDW 12.6 11.7 - 14.9 %    Platelets 363 142 - 841 K/CU MM    MPV 9.9 7.5 - 11.1 FL    Differential Type AUTOMATED DIFFERENTIAL     Segs Relative 65.4 36 - 66 %    Lymphocytes % 23.6 (L) 24 - 44 %    Monocytes % 8.1 (H) 0 - 4 %    Eosinophils % 2.1 0 - 3 %    Basophils % 0.5 0 - 1 %    Segs Absolute 9.0 K/CU MM    Lymphocytes Absolute 3.3 K/CU MM    Monocytes Absolute 1.1 K/CU MM    Eosinophils Absolute 0.3 K/CU MM    Basophils Absolute 0.1 K/CU MM    Nucleated RBC % 0.0 %    Total Nucleated RBC 0.0 K/CU MM    Total Immature Neutrophil 0.04 K/CU MM    Immature Neutrophil % 0.3 0 - 0.43 %   CMP   Result Value Ref Range    Sodium 139 135 - 145 MMOL/L    Potassium 4.1 3.5 - 5.1 MMOL/L    Chloride 100 99 - 110 mMol/L    CO2 23 21 - 32 MMOL/L    BUN 15 6 - 23 MG/DL    CREATININE 0.8 0.6 - 1.1 MG/DL    Glucose 89 70 - 99 MG/DL    Calcium 9.7 8.3 - 10.6 MG/DL    Alb 4.8 3.4 - 5.0 GM/DL    Total Protein 7.6 6.4 - 8.2 GM/DL    Total Bilirubin 1.1 (H) 0.0 - 1.0 MG/DL    ALT 10 10 - 40 U/L    AST 14 (L) 15 - 37 IU/L    Alkaline Phosphatase 57 40 - 129 IU/L    GFR Non-African American >60 >60 mL/min/1.73m2    GFR African American >60 >60 mL/min/1.73m2    Anion Gap 16 4 - 16   HCG Serum, Qualitative   Result Value Ref Range    hCG Qual NEGATIVE    Salicylate   Result Value Ref Range    Salicylate Lvl <4.7 (L) 15 - 30 MG/DL    DOSE AMOUNT DOSE AMT. GIVEN - UNKNOWN     DOSE TIME DOSE TIME GIVEN - UNKNOWN    Acetaminophen Level   Result Value Ref Range    Acetaminophen Level <5.0 (L) 15 - 30 ug/ml    DOSE AMOUNT DOSE AMT.  GIVEN - UNKNOWN     DOSE TIME DOSE TIME GIVEN - UNKNOWN    Urinalysis (Lab)   Result Value Ref Range    Color, UA TIM (A) YELLOW    Clarity, UA SLIGHTLY CLOUDY (A) CLEAR    Glucose, Urine NEGATIVE NEGATIVE MG/DL    Bilirubin Urine NEGATIVE NEGATIVE MG/DL    Ketones, Urine MODERATE (A) NEGATIVE MG/DL    Specific Gravity, UA 1.020 1.001 - 1.035    Blood, Urine SMALL (A) NEGATIVE    pH, Urine 5.0 5.0 - 8.0    Protein, UA 30 (A) NEGATIVE MG/DL    Urobilinogen, Urine NORMAL 0.2 - 1.0 MG/DL    Nitrite Urine, Quantitative POSITIVE (A) NEGATIVE    Leukocyte Esterase, Urine LARGE (A) NEGATIVE    RBC, UA 43 (H) 0 - 6 /HPF    WBC,  (H) 0 - 5 /HPF    Bacteria, UA MANY (A) NEGATIVE /HPF    Squam Epithel, UA 20 /HPF    Mucus, UA FEW (A) NEGATIVE HPF    Trichomonas, UA NONE SEEN NONE SEEN /HPF   Urine Drug Screen   Result Value Ref Range    Cannabinoid Scrn, Ur UNCONFIRMED POSITIVE (A) NEGATIVE    Amphetamines UNCONFIRMED POSITIVE (A) NEGATIVE    Cocaine Metabolite UNCONFIRMED POSITIVE (A) NEGATIVE Benzodiazepine Screen, Urine NEGATIVE NEGATIVE    Barbiturate Screen, Ur NEGATIVE NEGATIVE    Opiates, Urine NEGATIVE NEGATIVE    Phencyclidine, Urine NEGATIVE NEGATIVE    Oxycodone NEGATIVE NEGATIVE   Ethanol   Result Value Ref Range    Alcohol Scrn <0.01 <0.01 %WT/VOL         ED course: Pt presents as above. Emergent conditions considered. Presentation prompted initial labs. Patient was placed in mental health precautions. Patient actually requiring sedation on arrival given her agitation as she was a distraction to the entire department. Labs are overall nonemergent. Patient is found to have a nitrite positive urinary tract infection and urine is positive for several substances of abuse. Patient is placed on antibiotics. Urine culture sent. Patient's heart rate was initially tachycardic and is normalizing on recheck. Patient is medically cleared. Patient is still quite sedate on recheck after her initial sedation. Patient will need mental health assessment and likely placement given her initial agitation. Patient is pink slipped. All diagnostic, treatment, and disposition decisions were made by myself in conjunction with the Advanced Practice Provider. For all further details of the patient's emergency department visit, please see the Advanced Practice Provider's documentation.        Charis Jackson MD  09/03/20 7690

## 2020-09-03 NOTE — ED TRIAGE NOTES
Pt denies suicidal and homicidal thoughts. Pt speaking rapidly of [employer], sleep walking, having conversations with people who are not there. Pt states the [employer] is after her.

## 2020-09-03 NOTE — ED NOTES
Report given to Desean Escobar RN who is assuming care of pt. Pt waiting for Mental health assessment.       Temo Martinez RN  09/03/20 6736

## 2020-09-03 NOTE — ED NOTES
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 Pt varies from loud rapid speech, to soft slow speech.       [address], RN  09/03/20 8651

## 2020-09-03 NOTE — ED NOTES
Meeta Tesfaye here to assess pt for [address] [facility], [address] [place] [address]  09/03/20 2125

## 2020-09-03 NOTE — ED PROVIDER NOTES
Emergency Department Encounter    Patient: Melody Lanier  MRN: [id number]  : 1995  Date of Evaluation: 9/3/2020  ED Provider:  Ratna Hayes    Patient was signed out to me by Dr. Stacie Lees on 9/3/2020 at 1400  Please see his/her initial documentation for details of the patient's initial ED presentation, physical exam and completed studies. Briefly, Melody Lanier is a 26 y.o. female presented to the emergency department by [facility] and EMS after being found talking to herself and pacing outside. Patient is talking to herself on arrival with very bizarre thoughts. Patient is perseverating about being in the [employer] and marching. Patient is actively shouting and is hard to redirect on arrival limiting history. Patient denies any explicit suicidal or homicidal ideation.     I have reviewed and interpreted all of the currently available diagnostic data from this visit    Labs  Results for orders placed or performed during the hospital encounter of 20   CBC auto diff   Result Value Ref Range    WBC 13.8 (H) 4.0 - 10.5 K/CU MM    RBC 4.94 4.2 - 5.4 M/CU MM    Hemoglobin 14.7 12.5 - 16.0 GM/DL    Hematocrit 44.8 37 - 47 %    MCV 90.7 78 - 100 FL    MCH 29.8 27 - 31 PG    MCHC 32.8 32.0 - 36.0 %    RDW 12.6 11.7 - 14.9 %    Platelets 419 871 - 556 K/CU MM    MPV 9.9 7.5 - 11.1 FL    Differential Type AUTOMATED DIFFERENTIAL     Segs Relative 65.4 36 - 66 %    Lymphocytes % 23.6 (L) 24 - 44 %    Monocytes % 8.1 (H) 0 - 4 %    Eosinophils % 2.1 0 - 3 %    Basophils % 0.5 0 - 1 %    Segs Absolute 9.0 K/CU MM    Lymphocytes Absolute 3.3 K/CU MM    Monocytes Absolute 1.1 K/CU MM    Eosinophils Absolute 0.3 K/CU MM    Basophils Absolute 0.1 K/CU MM    Nucleated RBC % 0.0 %    Total Nucleated RBC 0.0 K/CU MM    Total Immature Neutrophil 0.04 K/CU MM    Immature Neutrophil % 0.3 0 - 0.43 %   CMP   Result Value Ref Range    Sodium 139 135 - 145 MMOL/L    Potassium 4.1 3.5 - 5.1 MMOL/L    Chloride 100 99 - 110 mMol/L CO2 23 21 - 32 MMOL/L    BUN 15 6 - 23 MG/DL    CREATININE 0.8 0.6 - 1.1 MG/DL    Glucose 89 70 - 99 MG/DL    Calcium 9.7 8.3 - 10.6 MG/DL    Alb 4.8 3.4 - 5.0 GM/DL    Total Protein 7.6 6.4 - 8.2 GM/DL    Total Bilirubin 1.1 (H) 0.0 - 1.0 MG/DL    ALT 10 10 - 40 U/L    AST 14 (L) 15 - 37 IU/L    Alkaline Phosphatase 57 40 - 129 IU/L    GFR Non-African American >60 >60 mL/min/1.73m2    GFR African American >60 >60 mL/min/1.73m2    Anion Gap 16 4 - 16   HCG Serum, Qualitative   Result Value Ref Range    hCG Qual NEGATIVE    Salicylate   Result Value Ref Range    Salicylate Lvl <8.9 (L) 15 - 30 MG/DL    DOSE AMOUNT DOSE AMT. GIVEN - UNKNOWN     DOSE TIME DOSE TIME GIVEN - UNKNOWN    Acetaminophen Level   Result Value Ref Range    Acetaminophen Level <5.0 (L) 15 - 30 ug/ml    DOSE AMOUNT DOSE AMT.  GIVEN - UNKNOWN     DOSE TIME DOSE TIME GIVEN - UNKNOWN    Urinalysis (Lab)   Result Value Ref Range    Color, UA TIM (A) YELLOW    Clarity, UA SLIGHTLY CLOUDY (A) CLEAR    Glucose, Urine NEGATIVE NEGATIVE MG/DL    Bilirubin Urine NEGATIVE NEGATIVE MG/DL    Ketones, Urine MODERATE (A) NEGATIVE MG/DL    Specific Gravity, UA 1.020 1.001 - 1.035    Blood, Urine SMALL (A) NEGATIVE    pH, Urine 5.0 5.0 - 8.0    Protein, UA 30 (A) NEGATIVE MG/DL    Urobilinogen, Urine NORMAL 0.2 - 1.0 MG/DL    Nitrite Urine, Quantitative POSITIVE (A) NEGATIVE    Leukocyte Esterase, Urine LARGE (A) NEGATIVE    RBC, UA 43 (H) 0 - 6 /HPF    WBC,  (H) 0 - 5 /HPF    Bacteria, UA MANY (A) NEGATIVE /HPF    Squam Epithel, UA 20 /HPF    Mucus, UA FEW (A) NEGATIVE HPF    Trichomonas, UA NONE SEEN NONE SEEN /HPF   Urine Drug Screen   Result Value Ref Range    Cannabinoid Scrn, Ur UNCONFIRMED POSITIVE (A) NEGATIVE    Amphetamines UNCONFIRMED POSITIVE (A) NEGATIVE    Cocaine Metabolite UNCONFIRMED POSITIVE (A) NEGATIVE    Benzodiazepine Screen, Urine NEGATIVE NEGATIVE    Barbiturate Screen, Ur NEGATIVE NEGATIVE    Opiates, Urine NEGATIVE NEGATIVE to follow-up with primary care physician for reevaluation. Instructed to return to the emergency department immediately with new, worsening or concerning symptoms. Instructed to follow-up with psychiatry as well. Additional verbal and printed discharge instructions provided. Patient verbalized understanding agreeable to treatment plan. Patient was discharged in stable, nontoxic condition      Clinical Impressions:  1. Hallucinations    2. Urinary tract infection without hematuria, site unspecified    3. Polysubstance abuse [facility])        Disposition referral (if applicable):  [address]  [address] [facility] [address]  [phone]    Schedule an appointment as soon as possible for a visit       [facility], [facility] [phone]  [phone]  Schedule an appointment as soon as possible for a visit         Disposition medications (if applicable):  Discharge Medication List as of 9/3/2020  6:43 PM      START taking these medications    Details   cephALEXin (KEFLEX) 500 MG capsule Take 1 capsule by mouth 2 times daily for 7 days, Disp-14 capsule,R-0Print             Comment: Please note this report has been produced using speech recognition software and may contain errors related to that system including errors in grammar, punctuation, and spelling, as well as words and phrases that may be inappropriate. Efforts were made to edit the dictations.        [address] [facility],   09/03/20 4911

## 2020-10-13 ENCOUNTER — APPOINTMENT (OUTPATIENT)
Dept: GENERAL RADIOLOGY | Age: 25
End: 2020-10-13
Payer: MEDICAID

## 2020-10-13 ENCOUNTER — APPOINTMENT (OUTPATIENT)
Dept: CT IMAGING | Age: 25
End: 2020-10-13
Payer: MEDICAID

## 2020-10-13 ENCOUNTER — HOSPITAL ENCOUNTER (EMERGENCY)
Age: 25
Discharge: PSYCHIATRIC OR SUBSTANCE USE FACILITY | End: 2020-10-15
Attending: EMERGENCY MEDICINE
Payer: MEDICAID

## 2020-10-13 LAB
ACETAMINOPHEN LEVEL: <5 UG/ML (ref 15–30)
ALBUMIN SERPL-MCNC: 4.1 GM/DL (ref 3.4–5)
ALCOHOL SCREEN SERUM: <0.01 %WT/VOL
ALP BLD-CCNC: 71 IU/L (ref 40–129)
ALT SERPL-CCNC: 9 U/L (ref 10–40)
AMPHETAMINES: NEGATIVE
ANION GAP SERPL CALCULATED.3IONS-SCNC: 12 MMOL/L (ref 4–16)
AST SERPL-CCNC: 13 IU/L (ref 15–37)
BACTERIA: ABNORMAL /HPF
BARBITURATE SCREEN URINE: NEGATIVE
BASOPHILS ABSOLUTE: 0 K/CU MM
BASOPHILS RELATIVE PERCENT: 0.4 % (ref 0–1)
BENZODIAZEPINE SCREEN, URINE: NEGATIVE
BILIRUB SERPL-MCNC: 0.5 MG/DL (ref 0–1)
BILIRUBIN URINE: NEGATIVE MG/DL
BLOOD, URINE: ABNORMAL
BUN BLDV-MCNC: 6 MG/DL (ref 6–23)
CALCIUM SERPL-MCNC: 8.9 MG/DL (ref 8.3–10.6)
CANNABINOID SCREEN URINE: ABNORMAL
CHLORIDE BLD-SCNC: 101 MMOL/L (ref 99–110)
CLARITY: ABNORMAL
CO2: 24 MMOL/L (ref 21–32)
COCAINE METABOLITE: ABNORMAL
COLOR: ABNORMAL
CREAT SERPL-MCNC: 0.7 MG/DL (ref 0.6–1.1)
DIFFERENTIAL TYPE: ABNORMAL
DOSE AMOUNT: ABNORMAL
DOSE AMOUNT: ABNORMAL
DOSE TIME: ABNORMAL
DOSE TIME: ABNORMAL
EOSINOPHILS ABSOLUTE: 0.2 K/CU MM
EOSINOPHILS RELATIVE PERCENT: 2.7 % (ref 0–3)
GFR AFRICAN AMERICAN: >60 ML/MIN/1.73M2
GFR NON-AFRICAN AMERICAN: >60 ML/MIN/1.73M2
GLUCOSE BLD-MCNC: 127 MG/DL (ref 70–99)
GLUCOSE, URINE: NEGATIVE MG/DL
GONADOTROPIN, CHORIONIC (HCG) QUANT: 0.6 UIU/ML
HCT VFR BLD CALC: 39.4 % (ref 37–47)
HEMOGLOBIN: 12.2 GM/DL (ref 12.5–16)
IMMATURE NEUTROPHIL %: 0.4 % (ref 0–0.43)
KETONES, URINE: ABNORMAL MG/DL
LEUKOCYTE ESTERASE, URINE: ABNORMAL
LYMPHOCYTES ABSOLUTE: 1.6 K/CU MM
LYMPHOCYTES RELATIVE PERCENT: 22.3 % (ref 24–44)
MCH RBC QN AUTO: 28.8 PG (ref 27–31)
MCHC RBC AUTO-ENTMCNC: 31 % (ref 32–36)
MCV RBC AUTO: 92.9 FL (ref 78–100)
MONOCYTES ABSOLUTE: 0.7 K/CU MM
MONOCYTES RELATIVE PERCENT: 10.1 % (ref 0–4)
MUCUS: ABNORMAL HPF
NITRITE URINE, QUANTITATIVE: POSITIVE
NUCLEATED RBC %: 0 %
OPIATES, URINE: NEGATIVE
OXYCODONE: NEGATIVE
PDW BLD-RTO: 13.3 % (ref 11.7–14.9)
PH, URINE: 5 (ref 5–8)
PHENCYCLIDINE, URINE: NEGATIVE
PLATELET # BLD: 325 K/CU MM (ref 140–440)
PMV BLD AUTO: 9.8 FL (ref 7.5–11.1)
POTASSIUM SERPL-SCNC: 3.6 MMOL/L (ref 3.5–5.1)
PROTEIN UA: 30 MG/DL
RBC # BLD: 4.24 M/CU MM (ref 4.2–5.4)
RBC URINE: 6 /HPF (ref 0–6)
SALICYLATE LEVEL: <0.3 MG/DL (ref 15–30)
SEGMENTED NEUTROPHILS ABSOLUTE COUNT: 4.7 K/CU MM
SEGMENTED NEUTROPHILS RELATIVE PERCENT: 64.1 % (ref 36–66)
SODIUM BLD-SCNC: 137 MMOL/L (ref 135–145)
SPECIFIC GRAVITY UA: 1.02 (ref 1–1.03)
SQUAMOUS EPITHELIAL: 1 /HPF
TOTAL IMMATURE NEUTOROPHIL: 0.03 K/CU MM
TOTAL NUCLEATED RBC: 0 K/CU MM
TOTAL PROTEIN: 6.7 GM/DL (ref 6.4–8.2)
TRANSITIONAL EPITHELIAL: <1 /HPF
TRICHOMONAS: ABNORMAL /HPF
TSH HIGH SENSITIVITY: 0.48 UIU/ML (ref 0.27–4.2)
UROBILINOGEN, URINE: 2 MG/DL (ref 0.2–1)
WBC # BLD: 7.3 K/CU MM (ref 4–10.5)
WBC UA: 41 /HPF (ref 0–5)

## 2020-10-13 PROCEDURE — 80307 DRUG TEST PRSMV CHEM ANLYZR: CPT

## 2020-10-13 PROCEDURE — 81001 URINALYSIS AUTO W/SCOPE: CPT

## 2020-10-13 PROCEDURE — G0480 DRUG TEST DEF 1-7 CLASSES: HCPCS

## 2020-10-13 PROCEDURE — 83721 ASSAY OF BLOOD LIPOPROTEIN: CPT

## 2020-10-13 PROCEDURE — 6360000002 HC RX W HCPCS: Performed by: PHYSICIAN ASSISTANT

## 2020-10-13 PROCEDURE — 84702 CHORIONIC GONADOTROPIN TEST: CPT

## 2020-10-13 PROCEDURE — 2580000003 HC RX 258: Performed by: PHYSICIAN ASSISTANT

## 2020-10-13 PROCEDURE — 85025 COMPLETE CBC W/AUTO DIFF WBC: CPT

## 2020-10-13 PROCEDURE — 93005 ELECTROCARDIOGRAM TRACING: CPT | Performed by: PHYSICIAN ASSISTANT

## 2020-10-13 PROCEDURE — 87186 SC STD MICRODIL/AGAR DIL: CPT

## 2020-10-13 PROCEDURE — 96366 THER/PROPH/DIAG IV INF ADDON: CPT

## 2020-10-13 PROCEDURE — 84443 ASSAY THYROID STIM HORMONE: CPT

## 2020-10-13 PROCEDURE — 87086 URINE CULTURE/COLONY COUNT: CPT

## 2020-10-13 PROCEDURE — 70450 CT HEAD/BRAIN W/O DYE: CPT

## 2020-10-13 PROCEDURE — 96372 THER/PROPH/DIAG INJ SC/IM: CPT

## 2020-10-13 PROCEDURE — 96365 THER/PROPH/DIAG IV INF INIT: CPT

## 2020-10-13 PROCEDURE — 71045 X-RAY EXAM CHEST 1 VIEW: CPT

## 2020-10-13 PROCEDURE — 80061 LIPID PANEL: CPT

## 2020-10-13 PROCEDURE — 87077 CULTURE AEROBIC IDENTIFY: CPT

## 2020-10-13 PROCEDURE — 99285 EMERGENCY DEPT VISIT HI MDM: CPT

## 2020-10-13 PROCEDURE — 84703 CHORIONIC GONADOTROPIN ASSAY: CPT

## 2020-10-13 PROCEDURE — 87088 URINE BACTERIA CULTURE: CPT

## 2020-10-13 PROCEDURE — 96375 TX/PRO/DX INJ NEW DRUG ADDON: CPT

## 2020-10-13 PROCEDURE — 80053 COMPREHEN METABOLIC PANEL: CPT

## 2020-10-13 PROCEDURE — 93010 ELECTROCARDIOGRAM REPORT: CPT | Performed by: INTERNAL MEDICINE

## 2020-10-13 RX ORDER — DIPHENHYDRAMINE HYDROCHLORIDE 50 MG/ML
50 INJECTION INTRAMUSCULAR; INTRAVENOUS ONCE
Status: COMPLETED | OUTPATIENT
Start: 2020-10-13 | End: 2020-10-13

## 2020-10-13 RX ORDER — CEPHALEXIN 250 MG/1
500 CAPSULE ORAL EVERY 12 HOURS SCHEDULED
Status: DISCONTINUED | OUTPATIENT
Start: 2020-10-13 | End: 2020-10-13

## 2020-10-13 RX ORDER — LORAZEPAM 2 MG/ML
2 INJECTION INTRAMUSCULAR ONCE
Status: COMPLETED | OUTPATIENT
Start: 2020-10-13 | End: 2020-10-13

## 2020-10-13 RX ORDER — HALOPERIDOL 5 MG/ML
5 INJECTION INTRAMUSCULAR ONCE
Status: COMPLETED | OUTPATIENT
Start: 2020-10-13 | End: 2020-10-13

## 2020-10-13 RX ADMIN — LORAZEPAM 2 MG: 2 INJECTION INTRAMUSCULAR; INTRAVENOUS at 12:45

## 2020-10-13 RX ADMIN — CEFTRIAXONE 1 G: 1 INJECTION, POWDER, FOR SOLUTION INTRAMUSCULAR; INTRAVENOUS at 16:20

## 2020-10-13 RX ADMIN — HALOPERIDOL LACTATE 5 MG: 5 INJECTION, SOLUTION INTRAMUSCULAR at 12:45

## 2020-10-13 RX ADMIN — DIPHENHYDRAMINE HYDROCHLORIDE 50 MG: 50 INJECTION INTRAMUSCULAR; INTRAVENOUS at 12:45

## 2020-10-13 NOTE — ED NOTES
This nurse was added to this pts treatment team at this time. Pt is in bed, no distress is noted. Sitter is at the bedside.      Cruz Shetty RN  10/13/20 1931

## 2020-10-13 NOTE — ED NOTES
Given a drink of water per request of the patient. She is refusing to give a urine specimen at this time.       Oanh Almaraz RN  10/13/20 3752

## 2020-10-13 NOTE — ED NOTES
The patient is asleep and placed on the portable monitor per this nurse and Nadiya EDT. The EKG is then done per Nadiya EDT.       Lydia Escobar RN  10/13/20 9733

## 2020-10-13 NOTE — ED PROVIDER NOTES
EMERGENCY DEPARTMENT ENCOUNTER      PCP: Pool Mays MD    CHIEF COMPLAINT    Chief Complaint   Patient presents with    Paranoid     Patient staffed with supervising physician Dr. Dillan Mendes is a 26 y.o. female who presents by police. Please officer states that patient was in a [employer] making comments that someone had a bomb in the restaurant and then patient proceeded to start taking off her clothes. Please officer state patient was making multiple random comments which did not make sense. Patient unable to provide any history during my evaluation and she is making multiple random comments that do not make sense. Patient denies drugs or alcohol. REVIEW OF SYSTEMS    Review of systems limited due to patient mental status    PAST MEDICAL & SURGICALHISTORY    Past Medical History:   Diagnosis Date    Acid reflux     Asthma     IBS (irritable bowel syndrome)     Migraines      No past surgical history on file. CURRENT MEDICATIONS    Current Outpatient Rx   Medication Sig Dispense Refill    Etonogestrel (IMPLANON) 68 MG IMPL Inject  into the skin.  clindamycin-benzoyl peroxide (BENZACLIN) gel Apply  topically 2 times daily. Apply topically 2 times daily.  minocycline (MINOCIN;DYNACIN) 100 MG capsule Take 100 mg by mouth 2 times daily.            ALLERGIES    No Known Allergies    SOCIAL & FAMILYHISTORY    Social History     Socioeconomic History    Marital status: Single     Spouse name: Not on file    Number of children: Not on file    Years of education: Not on file    Highest education level: Not on file   Occupational History    Not on file   Social Needs    Financial resource strain: Not on file    Food insecurity     Worry: Not on file     Inability: Not on file    Transportation needs     Medical: Not on file     Non-medical: Not on file   Tobacco Use    Smoking status: Unknown If Ever Smoked   Substance and Sexual Activity    Alcohol use: Not on file     Comment: the patient refuses to answer    Drug use: Not on file     Comment: the patient will not answer    Sexual activity: Not on file   Lifestyle    Physical activity     Days per week: Not on file     Minutes per session: Not on file    Stress: Not on file   Relationships    Social connections     Talks on phone: Not on file     Gets together: Not on file     Attends Jew service: Not on file     Active member of club or organization: Not on file     Attends meetings of clubs or organizations: Not on file     Relationship status: Not on file    Intimate partner violence     Fear of current or ex partner: Not on file     Emotionally abused: Not on file     Physically abused: Not on file     Forced sexual activity: Not on file   Other Topics Concern    Not on file   Social History Narrative    Not on file     No family history on file. PHYSICAL EXAM    VITAL SIGNS: /62   Pulse 85   Temp 98.3 °F (36.8 °C) (Oral)   Resp 23   Ht 5' 5\" (1.651 m)   Wt 110 lb (49.9 kg)   SpO2 98%   BMI 18.30 kg/m²   Constitutional:  Well developed, well nourished, no acute distress  Eyes:  EOMI. PERRL, conjunctiva normal   HENT:  Atraumatic, external ears normal, nose normal   Neck/Lymphatics: supple, no JVD, no swollen nodes.     Respiratory:  No respiratory distress, normal breath sounds  Cardiovascular:  Normal rate, normal rhythm, no murmurs  GI:  Soft, nondistended, normal bowel sounds, nontender  Musculoskeletal:  No edema, no acute deformities   Integument:  Well hydrated   Neurologic:  Awake alert and oriented to person place and time, no slurred speech, normal gross motor coordination and strength   Psychiatric: Speaking rapidly, seems agitated      LABS:  Results for orders placed or performed during the hospital encounter of 10/13/20   CBC Auto Differential   Result Value Ref Range    WBC 7.3 4.0 - 10.5 K/CU MM    RBC 4.24 4.2 - 5.4 M/CU MM    Hemoglobin 12.2 (L) 12.5 - 16.0 GM/DL Hematocrit 39.4 37 - 47 %    MCV 92.9 78 - 100 FL    MCH 28.8 27 - 31 PG    MCHC 31.0 (L) 32.0 - 36.0 %    RDW 13.3 11.7 - 14.9 %    Platelets 353 190 - 112 K/CU MM    MPV 9.8 7.5 - 11.1 FL    Differential Type AUTOMATED DIFFERENTIAL     Segs Relative 64.1 36 - 66 %    Lymphocytes % 22.3 (L) 24 - 44 %    Monocytes % 10.1 (H) 0 - 4 %    Eosinophils % 2.7 0 - 3 %    Basophils % 0.4 0 - 1 %    Segs Absolute 4.7 K/CU MM    Lymphocytes Absolute 1.6 K/CU MM    Monocytes Absolute 0.7 K/CU MM    Eosinophils Absolute 0.2 K/CU MM    Basophils Absolute 0.0 K/CU MM    Nucleated RBC % 0.0 %    Total Nucleated RBC 0.0 K/CU MM    Total Immature Neutrophil 0.03 K/CU MM    Immature Neutrophil % 0.4 0 - 0.43 %   Comprehensive Metabolic Panel   Result Value Ref Range    Sodium 137 135 - 145 MMOL/L    Potassium 3.6 3.5 - 5.1 MMOL/L    Chloride 101 99 - 110 mMol/L    CO2 24 21 - 32 MMOL/L    BUN 6 6 - 23 MG/DL    CREATININE 0.7 0.6 - 1.1 MG/DL    Glucose 127 (H) 70 - 99 MG/DL    Calcium 8.9 8.3 - 10.6 MG/DL    Alb 4.1 3.4 - 5.0 GM/DL    Total Protein 6.7 6.4 - 8.2 GM/DL    Total Bilirubin 0.5 0.0 - 1.0 MG/DL    ALT 9 (L) 10 - 40 U/L    AST 13 (L) 15 - 37 IU/L    Alkaline Phosphatase 71 40 - 129 IU/L    GFR Non-African American >60 >60 mL/min/1.73m2    GFR African American >60 >60 mL/min/1.73m2    Anion Gap 12 4 - 16   TSH without Reflex   Result Value Ref Range    TSH, High Sensitivity 0.481 0.270 - 4.20 uIu/ml   Ethanol   Result Value Ref Range    Alcohol Scrn <0.01 <1.32 %WT/VOL   Salicylate   Result Value Ref Range    Salicylate Lvl <0.1 (L) 15 - 30 MG/DL    DOSE AMOUNT DOSE AMT. GIVEN - Unknown     DOSE TIME DOSE TIME GIVEN - Unknown    Acetaminophen Level   Result Value Ref Range    Acetaminophen Level <5.0 (L) 15 - 30 ug/ml    DOSE AMOUNT DOSE AMT.  GIVEN - UNKNOWN     DOSE TIME DOSE TIME GIVEN - UNKNOWN    Urine Drug Screen   Result Value Ref Range    Cannabinoid Scrn, Ur UNCONFIRMED POSITIVE (A) NEGATIVE    Amphetamines NEGATIVE NEGATIVE    Cocaine Metabolite UNCONFIRMED POSITIVE (A) NEGATIVE    Benzodiazepine Screen, Urine NEGATIVE NEGATIVE    Barbiturate Screen, Ur NEGATIVE NEGATIVE    Opiates, Urine NEGATIVE NEGATIVE    Phencyclidine, Urine NEGATIVE NEGATIVE    Oxycodone NEGATIVE NEGATIVE   Urinalysis   Result Value Ref Range    Color, UA PIERO (A) YELLOW    Clarity, UA HAZY (A) CLEAR    Glucose, Urine NEGATIVE NEGATIVE MG/DL    Bilirubin Urine NEGATIVE NEGATIVE MG/DL    Ketones, Urine SMALL (A) NEGATIVE MG/DL    Specific Gravity, UA 1.017 1.001 - 1.035    Blood, Urine SMALL (A) NEGATIVE    pH, Urine 5.0 5.0 - 8.0    Protein, UA 30 (A) NEGATIVE MG/DL    Urobilinogen, Urine 2.0 (H) 0.2 - 1.0 MG/DL    Nitrite Urine, Quantitative POSITIVE (A) NEGATIVE    Leukocyte Esterase, Urine MODERATE (A) NEGATIVE    RBC, UA 6 0 - 6 /HPF    WBC, UA 41 (H) 0 - 5 /HPF    Bacteria, UA RARE (A) NEGATIVE /HPF    Squam Epithel, UA 1 /HPF    Trans Epithel, UA <1 /HPF    Mucus, UA OCCASIONAL (A) NEGATIVE HPF    Trichomonas, UA NONE SEEN NONE SEEN /HPF   HCG, Quantitative, Pregnancy   Result Value Ref Range    hCG Quant 0.6 UIU/ML   EKG 12 Lead   Result Value Ref Range    Ventricular Rate 84 BPM    Atrial Rate 84 BPM    P-R Interval 150 ms    QRS Duration 78 ms    Q-T Interval 420 ms    QTc Calculation (Bazett) 496 ms    P Axis 69 degrees    R Axis 52 degrees    T Axis 47 degrees    Diagnosis       Normal sinus rhythm  Possible Left atrial enlargement  Prolonged QT  Abnormal ECG  No previous ECGs available           IMAGING:  CT HEAD WO CONTRAST   Preliminary Result   1. No acute intracranial abnormality. 2. Mild bilateral mastoid disease. XR CHEST PORTABLE   Final Result   No acute cardiopulmonary disease             EKG Interpretation  Please see ED physician's note for EKG interpretation            ED [address] [facility]    Patient presents as above.   History is unable to be obtained from patient as she does not answer my questions

## 2020-10-13 NOTE — ED NOTES
The patient is drowsy but still awaken enough to refuse to co-operate with the staff. She does not want to use the restroom and is calling the sitter \"ugly\". This nurse advises the patient not to do that. She also refuses to have the pulse ox on any finger or to be on a portable monitor. The sitter remains in the room at the computer, but is getting a portable computer to sit in the doorway, out of the patient's glare.       Lydia Escobar RN  45/20/33 [address] [facility]  10/13/20 0149

## 2020-10-13 NOTE — ED NOTES
The patient has been brought to the ED per [employer] after she was frightening customers at [facility] . She was telling people in the store that certain ones of them were wearing bombs. She was acting very paranoid and accusing people of wanting to do her harm. They called [employer] who brought her here in hand-cuffs, while she screamed, and then flirted. She is assisted into a green gown and her belongings taken per our security. The patient continues to scream out and insult the staff, using profanity. Hand-cuffs removed per [facility] as the patient alows us to dress her in a gown. She is not under arrest at this time.       Mora Carter RN  96/35/29 [address] [facility] Damari Choe RN  10/13/20 3008

## 2020-10-13 NOTE — ED NOTES
Placed electrode stickers on patient but she refused to let both me (SN) and Mimi (RN) place the tele leads or the pulse ox on. Pt is sleeping/resting. Pt is being taken to CT scan.       Terry Morse  10/13/20 5479

## 2020-10-13 NOTE — ED NOTES
The patient is sleeping at this time. Her respirations are regular. The sitter remains in the open doorway at the computer.       Robin Rodriguez RN  10/13/20 3735

## 2020-10-13 NOTE — ED PROVIDER NOTES
I independently examined and evaluated [facility]. In brief their history revealed  a 26 y.o. female who presents by police. Please officer states that patient was in a [employer] making comments that someone had a bomb in the restaurant and then patient proceeded to start taking off her close. Please officer state patient was making multiple random comments which did not make sense. Patient unable to provide any history during my evaluation and she is making multiple random comments that do not make sense. Patient denies drugs or alcohol. Their focused exam revealed alert female resting in bed normocephalic atraumatic sclera clear airway normal lungs clear heart regular rhythm 2+ pulses throughout abdomen soft nontender bowel sounds present. 5 5 strength throughout skin has a rash or swelling cranial nerves intact is paranoid delusional flight of ideas hyperactive agitated possibly schizophrenia versus bipolar versus intoxicated with drugs    ED course: Patient seen with PA please see his note. Patient brought in by police, EMS was at [facility] stated there was a bomb patient has paranoia flight of ideas fixed beliefs, she is not cooperating she also has an elevated mood. Is giving the middle finger to my nurses. Will perform mental, psych work-up, medical work-up patient did require chemical sedation. Patient pink slipped due to inability to care for self due to psychotic illness. Patient is pink slipped again awaiting sobriety and mental health evaluation did require chemical sedation. Head CT is normal no signs of mastoiditis on physical exam.  Will sign out patient to Dr. Steven Mari. Patient need sobriety, medical clearance and mental health evaluation      12 lead EKG per my interpretation:  Normal Sinus Rhythm 84  Axis is   Normal  QTc is  496  There is no specific T wave changes appreciated. There is no specific ST wave changes appreciated.     Prior EKG to compare with was not available All diagnostic, treatment, and disposition decisions were made by myself in conjunction with the Advanced Practice Provider. For all further details of the patient's emergency department visit, please see the Advanced Practice Provider's documentation.        Clem De Souza DO  10/13/20 144

## 2020-10-13 NOTE — ED NOTES
Report received from [facility], pt care transferred at this time. Pt is sleeping, no distress noted. VS stable and pt is on telemonitor. Sitter at the bedside. Pt will still need evaluated.       Alex Wren RN  10/13/20 9546

## 2020-10-13 NOTE — ED PROVIDER NOTES
Emergency Department Encounter  Location: [address] [place] [address] EMERGENCY DEPARTMENT    Patient: Julia Tabor  MRN: [id number]  : 1995  Date of evaluation: 10/13/2020  ED Provider: Gilbert Garcia MD    3PM  Julia Tabor was checked out to me by Dr. Kurt Hickey. Please see his/her initial documentation for details of the patient's initial ED presentation, physical exam and completed studies. In brief, Julia Tabor is a 26 y.o. female that presented to the emergency department with paranoia and psychosis. She has required occasional chemical sedation. She is medically cleared. Awaiting mental health placement.     I have reviewed and interpreted all of the currently available lab results and diagnostics from this visit:  Results for orders placed or performed during the hospital encounter of 10/13/20   CBC Auto Differential   Result Value Ref Range    WBC 7.3 4.0 - 10.5 K/CU MM    RBC 4.24 4.2 - 5.4 M/CU MM    Hemoglobin 12.2 (L) 12.5 - 16.0 GM/DL    Hematocrit 39.4 37 - 47 %    MCV 92.9 78 - 100 FL    MCH 28.8 27 - 31 PG    MCHC 31.0 (L) 32.0 - 36.0 %    RDW 13.3 11.7 - 14.9 %    Platelets 441 965 - 660 K/CU MM    MPV 9.8 7.5 - 11.1 FL    Differential Type AUTOMATED DIFFERENTIAL     Segs Relative 64.1 36 - 66 %    Lymphocytes % 22.3 (L) 24 - 44 %    Monocytes % 10.1 (H) 0 - 4 %    Eosinophils % 2.7 0 - 3 %    Basophils % 0.4 0 - 1 %    Segs Absolute 4.7 K/CU MM    Lymphocytes Absolute 1.6 K/CU MM    Monocytes Absolute 0.7 K/CU MM    Eosinophils Absolute 0.2 K/CU MM    Basophils Absolute 0.0 K/CU MM    Nucleated RBC % 0.0 %    Total Nucleated RBC 0.0 K/CU MM    Total Immature Neutrophil 0.03 K/CU MM    Immature Neutrophil % 0.4 0 - 0.43 %   Comprehensive Metabolic Panel   Result Value Ref Range    Sodium 137 135 - 145 MMOL/L    Potassium 3.6 3.5 - 5.1 MMOL/L    Chloride 101 99 - 110 mMol/L    CO2 24 21 - 32 MMOL/L    BUN 6 6 - 23 MG/DL    CREATININE 0.7 0.6 - 1.1 MG/DL confusion, mental status change FINDINGS: BRAIN/VENTRICLES: There is no acute intracranial hemorrhage, mass effect or midline shift. No abnormal extra-axial fluid collection. The gray-white differentiation is maintained without evidence of an acute infarct. There is no evidence of hydrocephalus. There is a mild inferior cerebellar ectopia. No focus of acute abnormal brain attenuation is identified. ORBITS: The visualized portion of the orbits demonstrate no acute abnormality. SINUSES: Paranasal sinuses are clear. There is partial opacification of the bilateral mastoid air cells. SOFT TISSUES/SKULL:  No acute abnormality of the visualized skull or soft tissues. 1. No acute intracranial abnormality. 2. Mild bilateral mastoid disease. Xr Chest Portable    Result Date: 10/13/2020  EXAMINATION: ONE XRAY VIEW OF THE CHEST 10/13/2020 12:27 pm COMPARISON: None. HISTORY: ORDERING SYSTEM PROVIDED HISTORY: altered TECHNOLOGIST PROVIDED HISTORY: Reason for exam:->altered Reason for Exam: altered Acuity: Acute Type of Exam: Initial Additional signs and symptoms: female who presents by police. Please officer states that patient was in a [employer] making comments that someone had a bomb in the restaurant and then patient proceeded to start taking off her close. Please officer state patient was making multiple random comments which did not make sense. Patient unable to provide any history during my evaluation and she is making multiple random comments that do not make sense FINDINGS: No focal airspace consolidation, pleural effusion or pneumothorax is present. The heart is normal in size. The pulmonary vascularity is within normal limits. Osseous structures are unremarkable. No acute cardiopulmonary disease       Final ED Course and MDM: In brief, Maria Eugenia Dallas is a 26 y.o. female whose care was signed out to me by the outgoing provider. Continue to await placement. No needs during my shift.     11PM  I have signed out [facility] Emergency Department care to Dr. Reyes Quan. We discussed the pertinent history, physical exam, completed/pending test results (if applicable) and current treatment plan. Please refer to his/her chart for the patients remaining Emergency Department course and final disposition. ED Medication Orders (From admission, onward)    Start Ordered     Status Ordering Provider    10/13/20 1230 10/13/20 1225  diphenhydrAMINE (BENADRYL) injection 50 mg  ONCE      Last MAR action:  Given - by Gera Vincent on 25/08/90 at [place] [address]EVELINA LARS    10/13/20 1230 10/13/20 1225  LORazepam (ATIVAN) injection 2 mg  ONCE      Last MAR action:  Given - by Gera Vincent on 17/89/05 at [place] [address]EVELINA    10/13/20 1230 10/13/20 1225  haloperidol lactate (HALDOL) injection 5 mg  ONCE      Last MAR action:  Given - by Gera Vincent on 82/37/51 at [place] [address]Ian          Final Impression      1. Paranoia ([address]    2.  Mental health problem        DISPOSITION       (Please note that portions of this note may have been completed with a voice recognition program. Efforts were made to edit the dictations but occasionally words are mis-transcribed.)    Jojo Ferrara MD  [id number] [address], MD  10/14/20 3420

## 2020-10-14 LAB
SARS-COV-2, NAAT: NOT DETECTED
SOURCE: NORMAL

## 2020-10-14 PROCEDURE — U0002 COVID-19 LAB TEST NON-CDC: HCPCS

## 2020-10-14 PROCEDURE — 6370000000 HC RX 637 (ALT 250 FOR IP): Performed by: EMERGENCY MEDICINE

## 2020-10-14 PROCEDURE — 6360000002 HC RX W HCPCS: Performed by: EMERGENCY MEDICINE

## 2020-10-14 RX ORDER — LORAZEPAM 2 MG/ML
2 INJECTION INTRAMUSCULAR ONCE
Status: COMPLETED | OUTPATIENT
Start: 2020-10-14 | End: 2020-10-14

## 2020-10-14 RX ORDER — CEPHALEXIN 250 MG/1
500 CAPSULE ORAL EVERY 12 HOURS SCHEDULED
Status: DISCONTINUED | OUTPATIENT
Start: 2020-10-14 | End: 2020-10-15 | Stop reason: HOSPADM

## 2020-10-14 RX ORDER — HALOPERIDOL 5 MG/ML
5 INJECTION INTRAMUSCULAR ONCE
Status: COMPLETED | OUTPATIENT
Start: 2020-10-14 | End: 2020-10-14

## 2020-10-14 RX ADMIN — CEPHALEXIN 500 MG: 250 CAPSULE ORAL at 23:42

## 2020-10-14 RX ADMIN — LORAZEPAM 2 MG: 2 INJECTION INTRAMUSCULAR; INTRAVENOUS at 16:57

## 2020-10-14 RX ADMIN — HALOPERIDOL LACTATE 5 MG: 5 INJECTION, SOLUTION INTRAMUSCULAR at 16:58

## 2020-10-14 NOTE — ED NOTES
Patient resting in room, up and down from bed.  Sitter at bedside     JACQUIE's, [facility]  10/14/20 3220

## 2020-10-14 NOTE — ED NOTES
Call received from Reena Juarez, [address] @ [address], she tells me NP Emilia Kumar wishes for the patient to be reassessed later this morning as they aren't accepting her at this time   (though I shared that behavior has been calm and patient is sleeping). Shared with Reena Juarez that patient would be referred to [facility] and elsewhere for acceptance. Did verbally send referral for patient to [facility], asking that they will check bed status for [facility] and [facility]. Nkechi Narvaez, RN  67/90/71 6262 [facility], RN  58/49/09 0222

## 2020-10-14 NOTE — ED PROVIDER NOTES
Patient is endorsed to me by Dr. Héctor Juarez at 5464. In short, patient presented with acute psychosis. The patient was placed in suicide precautions, patient's clothing and belongings were removed, documented and stored in the emergency department. Patient was reported to me to be medically cleared. I have examined the patient and noted a normal exam and stable vitals. Mental health have evaluated the patientand haverecommended that the patient be transferred to a inpatient psychiatric facility. We are currently awaiting placement for the patient. Transferred to [address] [place] [address] Dr. Klaudia Pugh MD  10/14/20 [address], MD  10/15/20 3156

## 2020-10-14 NOTE — ED NOTES
Report given to [facility], care transferred at this time. Mental health finishing up assessment at this time. States he will push chart to their system at [facility] so only our chart needs faxed with pink slip.       Saloni Vincent RN  10/14/20 1929

## 2020-10-14 NOTE — ED NOTES
[facility] access callled, states that pink slip and chart needs to be faxed to [facility]. States pt needs reassessed and covid results need to be back before the patient can be placed.       Yisel Patten RN  10/14/20 7557

## 2020-10-14 NOTE — ED NOTES
Pt is asleep in bed. No distress is noted.  Sitter is at the bedside     Yordy Meyers RN  10/14/20 2754

## 2020-10-14 NOTE — ED NOTES
..Provisional Diagnosis:       Probable Psychosis  Delusional Behavior  Paranoia      Psychosocial and Contextual Factors:       Per Dr Shiloh Hernandez ED Physician  \"In brief their history revealed  a 26 y. o. female who presents by police. [facility] officer states that patient was in a [employer] making comments that someone had a bomb in the restaurant and then patient proceeded to start taking off her close. [facility] officer state patient was making multiple random comments which did not make sense.  Patient unable to provide any history during my evaluation and she is making multiple random comments that do not make sense.  Patient denies drugs or alcohol. .... is paranoid delusional flight of ideas hyperactive agitated possibly schizophrenia versus bipolar versus intoxicated with drugs. .. Blanchie Bevels Blanchie Bevels    ED course: Patient seen with PA please see his note. Patient brought in by police, EMS was at [facility] stated there was a bomb patient has paranoia flight of ideas fixed beliefs, she is not cooperating she also has an elevated mood. Blanchie Bevels Blanchie Bevels Blanchie Bevels \"    Pt presented tense, unkempt, demanding and suspicious    Appears to be responding to internal stimuli    Pt attempted to answer assessment questions but became increasingly agitated and paranoid stated the information was not needed     Pt is delusional, grandiose, feels she is superior to others, loose associations, circumstantial thoughts with flight of ideas    Behavior is bizarre and erratic, appears to be impulsive    Pt reported she was sitting at a friends and all of a sudden was arrested and brought to the ED,     Pt reported she is highly intelligent and that is why she is always being targeted,     Pt is guarded and secretive    Mood is labile, affect is blunted    Pt avoided questions regarding SI/HI/AVH    Pt is in no state to be able to assure her own safety or safety to others    Lacks insight, poor judgement    Evidence of poor self care    Reported hx of mental health but refused to answer any questions regarding past dx /past tx    Discussed all above with Dr Lucy Linton ED physician who recommends involuntary psychiatric care for observation, evaluation and care    Will seek placement          C-SSRS Summary:      Patient: Unknown  Family: No Hx shared  Agency: Unknown      Present Suicidal Behavior:      Verbal: Unknown    Attempt:Unknown    Past Suicidal Behavior:     Verbal:Unknown    Attempt:Unknown      Self-Injurious/Self-Mutilation:Unknown    Trauma Identified:  Unknown      Protective Factors:    None identified at this time    Risk Factors:    Probable Psychosis  Delusional Behavior  Paranoia        Clinical Summary:    As above  Will seek placement    Electronically signed by Dre Peña RN on 10/13/2020 at 10:47 PM      Dre Peña RN  10/13/20 0078

## 2020-10-14 NOTE — ED NOTES
Lab called and rejected the COVID swab, states that they need a new specimen     Carmel Brock RN  10/14/20 3758

## 2020-10-14 NOTE — ED NOTES
Patient shouting at sitter,  Sitter's changed. Patient currently sitting at foot of bed, no distress.  Avis at [facility], RN  10/14/20 1289

## 2020-10-14 NOTE — ED NOTES
[place] [address] [place] attempted to evaluate patient via phone, the patient refused, she began saying random things about the sitter and her water being drank by the sitter and that the sitter was \"creeping her out\".        César Santacruz RN  10/14/20 7132

## 2020-10-14 NOTE — ED NOTES
Pt throwing items from safety tray at this tech. [place], [place] notified.        Robert Diaz  10/14/20 9400

## 2020-10-14 NOTE — ED NOTES
After Visit Summary   11/6/2018    Amie Rodríguez    MRN: [id number]           Patient Information     Date Of Birth          2009        Visit Information        Provider Department      11/6/2018 9:30 AM Ric Berumen MD [facility]        Today's Diagnoses     Encounter for routine child health examination without abnormal findings    -  1    Need for influenza vaccination        Bilateral impacted cerumen          Care Instructions    Primary Care Center [phone] ([facility] 4th [facility])         Your 6 to 10 Year Old  Next Visit:    Next visit: In one year    Expect:   A blood pressure check, vision test, hearing test     Here are some tips to help keep your 6 to 10 year old healthy, safe and happy!  The Department of Health recommends your child see a dentist yearly.     Eating:    Your child should eat 3 meals and 1-2 healthy snacks a day.    Offer healthy snacks such as carrot, celery or cucumber sticks, fruit, yogurt, toast and cheese.  Avoid pop, candy, pastries, salty or fatty foods. Include 5 servings of vegetables and fruits at meals and snacks every day    Family meals at the table are important, but not while watching TV!  Safety:    Your child should use a booster seat for every ride until they weigh 60 - 80 pounds.  This will also help them see out the window. Under [place] law, a child cannot use a seat belt alone until they are age 8, or 4 feet 9 inches tall. It is recommended to keep a child in a booster based on their height rather than their age. Children should not ride in the front seat if your car.    Your child should always wear a helmet when biking, skating or on anything with wheels.  Teach bike safety rules.  Be a good example.    Don't keep a gun in your home.  If you do, the guns and ammunition should be locked up in separate places.    Teach about strangers and appropriate touch.    Make sure your child knows their full name, parents   Pt is laying in bed asleep. No distress is noted. Sitter is at the bedside.      Osmany Krueger RN  10/13/20 3456 "names, home phone number and emergency number (911).  Home Life:    Protect your child from smoke.  If someone in your house is smoking, your child is smoking too.  Do not allow anyone to smoke in your home.  Don't leave your child with a caretaker who smokes.    Discipline means \"to teach\".  Praise and hug your child for good behavior.  If they are doing something you don't like, do not spank or yell hurtful words.  Use temporary time-outs.  Put the child in a boring place, such as a corner of a room or chair.  Time-outs should last no longer than 1 minute for each year of age.  All the adults in the house should agree to the limits and rules.  Don't change the rules at random.      Set clear screen time (TV, computer, phone)  limits.  Limit screen time to 2 hours a day.  Encourage your child to do other things.  Praise them when they choose other activities that are good for them.  Forbid TV shows that are violent.    Your child should see the dentist at least  once a year.  They should brush their teeth for two minutes twice a day with fluoride toothpaste. Help your child floss their teeth once a day.  Development:    At 6-10 years most children can:  Write clearly and tell time  Understand right from wrong  Start to question authority  Want more independence           Give your child:    Limits and stick with them    Help making their own decisions    leonie Bond, affection    Updated 3/2018            Follow-ups after your visit        Who to contact     Please call your clinic at [phone] to:    Ask questions about your health    Make or cancel appointments    Discuss your medicines    Learn about your test results    Speak to your doctor            Additional Information About Your Visit        [employer] Information     [employer] is an electronic gateway that provides easy, online access to your medical records. With [employer], you can request a clinic appointment, read your test results, renew a prescription " "or communicate with your care team.     To sign up for [employer], please contact your [facility] or call [phone] for assistance.           Care EveryWhere ID     This is your Care EveryWhere ID. This could be used by other organizations to access your [facility] medical records  [id number]        Your Vitals Were     Pulse Height Pulse Oximetry BMI (Body Mass Index)          82 1.372 m (4' 6\") 98% 15.67 kg/m2         Blood Pressure from Last 3 Encounters:   11/06/18 102/61   04/18/18 94/61   02/07/18 113/63    Weight from Last 3 Encounters:   11/06/18 29.5 kg (65 lb) (74 %)*   04/18/18 26.8 kg (59 lb) (69 %)*   02/07/18 26.5 kg (58 lb 6.4 oz) (72 %)*     * Growth percentiles are based on [facility] 2-20 Years data.              We Performed the Following     FLU VACCINE, AGE >= 3 YR     REMOVE IMPACTED [facility]        Primary Care Provider Office Phone # Fax #    Ric Berumen [phone]       [address] [place] [phone]  [place] [address]        Equal Access to Services     JORDIN [facility] HospitalKAHLIL : Hadii hubert pompao Soangi, waaxda luqadaha, qaybta kaalmada adeegyada, emily watson. So [facility] [phone].    ATENCIÓN: Si habla español, tiene a fraga disposición servicios gratuitos de asistencia lingüística. Llame al [phone].    We comply with applicable federal civil rights laws and [place] laws. We do not discriminate on the basis of race, color, national origin, age, disability, sex, sexual orientation, or gender identity.            Thank you!     Thank you for choosing [facility]  for your care. Our goal is always to provide you with excellent care. Hearing back from our patients is one way we can continue to improve our services. Please take a few minutes to complete the written survey that you may receive in the mail after your visit with us. Thank you!             Your Updated Medication List - Protect others around you: " Learn how to safely use, store and throw away your medicines at www.disposemymeds.org.      Notice  As of 11/6/2018 11:06 AM    You have not been prescribed any medications.

## 2020-10-14 NOTE — ED NOTES
Pt is asleep in bed. No distress is noted. Sitter is at the bedside.      Redell Aase, RN  10/14/20 0273

## 2020-10-14 NOTE — ED NOTES
Pt resting in a position of comfort. No needs identified at this time. Bed in lowest position and sitter at bedside 1:1. Respirations even, no distress noted.       Moy Egan RN  10/14/20 1912

## 2020-10-14 NOTE — ED NOTES
Spoke with Jennifer Bueno RN charge at [facility], bed available, chart faxed for review      Dre Peña RN  10/14/20 5435

## 2020-10-14 NOTE — ED NOTES
Spoke with Shantel Castaneda at [facility], requested assistance in finding placement      Josesito Meadows RN  10/14/20 7942

## 2020-10-14 NOTE — ED NOTES
Received call from [facility]. Stated that [facility] is at capacity and [facility] is still requesting re-eval. [facility] still working on finding placement.      Asuncion Augustin  10/14/20 8806

## 2020-10-14 NOTE — ED NOTES
This nurse assuming care a this time. Pt currently sleeping with sitter at bedside.       Valerie Santos RN  10/14/20 8876

## 2020-10-14 NOTE — ED NOTES
Report received from Steph Walker, care assumed at this time. Pt resting in bed, no needs identified. Sitter at bedside, no needs identified.      Hammad Tabares RN  10/14/20 9359

## 2020-10-14 NOTE — ED NOTES
Pt resting in bed, no needs identified. Bed in lowest position and sitter at bedside 1:1. Respirations even, no distress noted.      Reji Pablo RN  10/14/20 7801

## 2020-10-14 NOTE — ED NOTES
Report from Yolanda Andrade , care taken over at this time , no needs voiced      Yfn Zelaya RN  10/14/20 1925

## 2020-10-14 NOTE — ED NOTES
Pt is resting in bed. No distress is noted. Sitter is at the bedside.      Dennys Nunn RN  10/14/20 4005

## 2020-10-14 NOTE — ED PROVIDER NOTES
5:53 AM EDT  Maria Eugenia Dallas was checked out to me by Dr. Anshul Barahona for [facility] placement . Please see his/her initial documentation for details of the patient's ED presentation, physical exam and completed studies. 2:15pm- Patient agitated, screaming at staff. Still awaiting MH placement. Ordered IM ativan/haldol. 3:08 PM EDT I have signed out [facility] Emergency Department care to Dr. Kamar Gardner. Awaiting covid test and placement. We discussed the history, physical exam, completed/pending test results (if obtained) and current treatment plan. Please refer to his/her chart for the patients further details, remaining Emergency Department course, final disposition and diagnosis.          Kana Braga MD  10/14/20 9729

## 2020-10-14 NOTE — ED NOTES
Racquel Elkins, , states that when the mental health therapist was in assessing pt the patient stated we had not fed or given her a drink all day. Pt had an empty tray of food she had finished eating as well as an empty container of grape juice she threw into the corner yelling at us to throw away. Mental health therapist informed of this and her behavior throughout the day.        Tiajuana Cushing, RN  10/14/20 1929

## 2020-10-14 NOTE — ED NOTES
Pt yelling and becoming agitated. Security at bedside to help pt be medicated. Pt stated \"Im gonna fuck you up. You don't bother me. Fuck you. Im not scared of you. You can't make me do shit. \" Pt then grabbed test tube out of this nurse's hand and threw it on the floor. Security assisted me at this time in medicating patient. Pt on bed crying. Pt laying on mattress, respirations even, no distress noted.  Sitter at bedside 1:1.      Keila Burgos RN  10/14/20 1700

## 2020-10-14 NOTE — ED NOTES
Pt is laying in bed asleep. No distress is noted. Sitter is at the bedside.      Greg Dangelo RN  10/14/20 5318

## 2020-10-15 VITALS
BODY MASS INDEX: 18.33 KG/M2 | SYSTOLIC BLOOD PRESSURE: 115 MMHG | DIASTOLIC BLOOD PRESSURE: 56 MMHG | HEIGHT: 65 IN | HEART RATE: 87 BPM | TEMPERATURE: 98.4 F | WEIGHT: 110 LBS | RESPIRATION RATE: 18 BRPM | OXYGEN SATURATION: 98 %

## 2020-10-15 LAB
CULTURE: ABNORMAL
CULTURE: ABNORMAL
Lab: ABNORMAL
SPECIMEN: ABNORMAL

## 2020-10-15 NOTE — ED NOTES
Pt has been accepted by [facility] Breaker 0230      Monica Ambrosio  10/15/20 0114

## 2020-10-15 NOTE — ED NOTES
Patient is endorsed to me by Dr. Yi Nichols at [facility]. In short, patient presented with psychosis. The patient was placed in suicide precautions, patient's clothing and belongings were removed, documented and stored in the emergency department. Patient was reported to me to be medically cleared. I have examined the patient and noted a normal exam and stable vitals. Mental health have evaluated the patientand haverecommended that the patient be transferred to a inpatient psychiatric facility. We are currently awaiting placement for the patient. 0600:a.m.  I have signed out [facility] Emergency Department care to Dr. Josefina Molina. We discussed the pertinent history, physical exam, completed/pending test results (if applicable) and current treatment plan. Please refer to his/her chart for the patients remaining Emergency Department course and final disposition.          Dhaval Chow MD  10/15/20 5910

## 2020-10-16 LAB
CHOLESTEROL: 130 MG/DL
HDLC SERPL-MCNC: 31 MG/DL
LDL CHOLESTEROL DIRECT: 86 MG/DL
SARS-COV-2: NOT DETECTED
SOURCE: NORMAL
TRIGL SERPL-MCNC: 88 MG/DL

## 2020-10-20 ENCOUNTER — HOSPITAL ENCOUNTER (OUTPATIENT)
Age: 25
Setting detail: SPECIMEN
Discharge: HOME | End: 2020-10-20

## 2020-10-20 LAB
DOSE AMOUNT: NORMAL
DOSE TIME: NORMAL
EKG ATRIAL RATE: 84 BPM
EKG DIAGNOSIS: NORMAL
EKG P AXIS: 69 DEGREES
EKG P-R INTERVAL: 150 MS
EKG Q-T INTERVAL: 420 MS
EKG QRS DURATION: 78 MS
EKG QTC CALCULATION (BAZETT): 496 MS
EKG R AXIS: 52 DEGREES
EKG T AXIS: 47 DEGREES
EKG VENTRICULAR RATE: 84 BPM
VALPROIC ACID LEVEL: 81.9 UG/ML (ref 50–100)

## 2020-10-20 PROCEDURE — 80164 ASSAY DIPROPYLACETIC ACD TOT: CPT

## 2020-10-20 PROCEDURE — 36415 COLL VENOUS BLD VENIPUNCTURE: CPT

## 2020-12-12 ENCOUNTER — HOSPITAL ENCOUNTER (EMERGENCY)
Age: 25
Discharge: OTHER INSTITUTION | End: 2020-12-13
Attending: EMERGENCY MEDICINE
Payer: MEDICAID

## 2020-12-12 LAB
ACETAMINOPHEN LEVEL: <5 UG/ML (ref 15–30)
ALCOHOL SCREEN SERUM: <0.01 %WT/VOL
AMPHETAMINES: NEGATIVE
BACTERIA: NEGATIVE /HPF
BARBITURATE SCREEN URINE: NEGATIVE
BASOPHILS ABSOLUTE: 0 K/CU MM
BASOPHILS RELATIVE PERCENT: 0.3 % (ref 0–1)
BENZODIAZEPINE SCREEN, URINE: NEGATIVE
BILIRUBIN URINE: NEGATIVE MG/DL
BLOOD, URINE: ABNORMAL
CANNABINOID SCREEN URINE: ABNORMAL
CLARITY: ABNORMAL
COCAINE METABOLITE: ABNORMAL
COLOR: ABNORMAL
DIFFERENTIAL TYPE: ABNORMAL
DOSE AMOUNT: ABNORMAL
DOSE AMOUNT: ABNORMAL
DOSE TIME: ABNORMAL
DOSE TIME: ABNORMAL
EOSINOPHILS ABSOLUTE: 0.2 K/CU MM
EOSINOPHILS RELATIVE PERCENT: 2.5 % (ref 0–3)
GLUCOSE, URINE: NEGATIVE MG/DL
GONADOTROPIN, CHORIONIC (HCG) QUANT: 0.7 UIU/ML
HCT VFR BLD CALC: 39.7 % (ref 37–47)
HEMOGLOBIN: 12.9 GM/DL (ref 12.5–16)
IMMATURE NEUTROPHIL %: 0.4 % (ref 0–0.43)
KETONES, URINE: NEGATIVE MG/DL
LEUKOCYTE ESTERASE, URINE: ABNORMAL
LYMPHOCYTES ABSOLUTE: 1.4 K/CU MM
LYMPHOCYTES RELATIVE PERCENT: 16.1 % (ref 24–44)
MCH RBC QN AUTO: 30.1 PG (ref 27–31)
MCHC RBC AUTO-ENTMCNC: 32.5 % (ref 32–36)
MCV RBC AUTO: 92.8 FL (ref 78–100)
MONOCYTES ABSOLUTE: 0.9 K/CU MM
MONOCYTES RELATIVE PERCENT: 10.5 % (ref 0–4)
MUCUS: ABNORMAL HPF
NITRITE URINE, QUANTITATIVE: NEGATIVE
NUCLEATED RBC %: 0 %
OPIATES, URINE: NEGATIVE
OXYCODONE: NEGATIVE
PDW BLD-RTO: 14.2 % (ref 11.7–14.9)
PH, URINE: 7 (ref 5–8)
PHENCYCLIDINE, URINE: NEGATIVE
PLATELET # BLD: 218 K/CU MM (ref 140–440)
PMV BLD AUTO: 9.7 FL (ref 7.5–11.1)
PROTEIN UA: NEGATIVE MG/DL
RBC # BLD: 4.28 M/CU MM (ref 4.2–5.4)
RBC URINE: 2 /HPF (ref 0–6)
SALICYLATE LEVEL: <0.3 MG/DL (ref 15–30)
SARS-COV-2, NAAT: NOT DETECTED
SEGMENTED NEUTROPHILS ABSOLUTE COUNT: 6.3 K/CU MM
SEGMENTED NEUTROPHILS RELATIVE PERCENT: 70.2 % (ref 36–66)
SOURCE: NORMAL
SPECIFIC GRAVITY UA: 1 (ref 1–1.03)
SQUAMOUS EPITHELIAL: 3 /HPF
TOTAL IMMATURE NEUTOROPHIL: 0.04 K/CU MM
TOTAL NUCLEATED RBC: 0 K/CU MM
TRICHOMONAS: ABNORMAL /HPF
TSH HIGH SENSITIVITY: 0.86 UIU/ML (ref 0.27–4.2)
UROBILINOGEN, URINE: NORMAL MG/DL (ref 0.2–1)
WBC # BLD: 8.9 K/CU MM (ref 4–10.5)
WBC UA: 7 /HPF (ref 0–5)

## 2020-12-12 PROCEDURE — 36415 COLL VENOUS BLD VENIPUNCTURE: CPT

## 2020-12-12 PROCEDURE — U0002 COVID-19 LAB TEST NON-CDC: HCPCS

## 2020-12-12 PROCEDURE — 80307 DRUG TEST PRSMV CHEM ANLYZR: CPT

## 2020-12-12 PROCEDURE — G0480 DRUG TEST DEF 1-7 CLASSES: HCPCS

## 2020-12-12 PROCEDURE — 81001 URINALYSIS AUTO W/SCOPE: CPT

## 2020-12-12 PROCEDURE — 84702 CHORIONIC GONADOTROPIN TEST: CPT

## 2020-12-12 PROCEDURE — 99285 EMERGENCY DEPT VISIT HI MDM: CPT

## 2020-12-12 PROCEDURE — 84443 ASSAY THYROID STIM HORMONE: CPT

## 2020-12-12 PROCEDURE — 85025 COMPLETE CBC W/AUTO DIFF WBC: CPT

## 2020-12-12 PROCEDURE — 80053 COMPREHEN METABOLIC PANEL: CPT

## 2020-12-12 RX ORDER — 0.9 % SODIUM CHLORIDE 0.9 %
2000 INTRAVENOUS SOLUTION INTRAVENOUS ONCE
Status: COMPLETED | OUTPATIENT
Start: 2020-12-13 | End: 2020-12-13

## 2020-12-13 VITALS
TEMPERATURE: 97.9 F | SYSTOLIC BLOOD PRESSURE: 104 MMHG | RESPIRATION RATE: 14 BRPM | OXYGEN SATURATION: 97 % | HEART RATE: 68 BPM | DIASTOLIC BLOOD PRESSURE: 60 MMHG

## 2020-12-13 LAB
ALBUMIN SERPL-MCNC: 4.3 GM/DL (ref 3.4–5)
ALP BLD-CCNC: 48 IU/L (ref 40–129)
ALT SERPL-CCNC: 5 U/L (ref 10–40)
ANION GAP SERPL CALCULATED.3IONS-SCNC: 19 MMOL/L (ref 4–16)
AST SERPL-CCNC: 14 IU/L (ref 15–37)
BILIRUB SERPL-MCNC: 0.3 MG/DL (ref 0–1)
BUN BLDV-MCNC: 8 MG/DL (ref 6–23)
CALCIUM SERPL-MCNC: 9 MG/DL (ref 8.3–10.6)
CHLORIDE BLD-SCNC: 92 MMOL/L (ref 99–110)
CO2: 24 MMOL/L (ref 21–32)
CREAT SERPL-MCNC: 0.8 MG/DL (ref 0.6–1.1)
GFR AFRICAN AMERICAN: >60 ML/MIN/1.73M2
GFR NON-AFRICAN AMERICAN: >60 ML/MIN/1.73M2
GLUCOSE BLD-MCNC: 119 MG/DL (ref 70–99)
POTASSIUM SERPL-SCNC: 3.6 MMOL/L (ref 3.5–5.1)
SODIUM BLD-SCNC: 135 MMOL/L
TOTAL PROTEIN: 7.4 GM/DL (ref 6.4–8.2)

## 2020-12-13 PROCEDURE — 2580000003 HC RX 258: Performed by: EMERGENCY MEDICINE

## 2020-12-13 RX ADMIN — SODIUM CHLORIDE 2000 ML: 9 INJECTION, SOLUTION INTRAVENOUS at 00:37

## 2020-12-13 NOTE — ED NOTES
Rounding of the patient was done and the patient was sleeping in the bed. The patient constant observer is at bedside and has no concerns of patient safety. Every 15 minute visual checks continued.      Adri Reilly RN  12/13/20 8554

## 2020-12-13 NOTE — ED NOTES
Bed: ED-23  Expected date:   Expected time:   Means of arrival:   Comments:  jennifer Coates RN  12/12/20 6438

## 2020-12-13 NOTE — ED NOTES
This RN assumed care at this time. Pt is resting comfortably in bed. No distress noted at this time.  Sitter remains at bedside 1:1.      Ivan Case RN  12/13/20 8110

## 2020-12-13 NOTE — ED NOTES
Call from Tara, [facility] [address] @ [address] [facility] [address] [facility], Dr. Eden Brown has accepted patient, will be Involuntary - PINK SLIP. Nurse to Nurse Report has been provided to [facility]. We may arrange transport.      Evelia Dolan RN  55/61/98 8394

## 2020-12-13 NOTE — ED NOTES
[facility] must have transport authorized through [address] [facility] transport patient with [facility] [employer], will ask [facility] to arrange transport to [address] for this patient.     Dr. Hubert Irwin, ED Physician is in the process of completing PINK SLIP and Eddie Gu, RN  95/52/19 4273

## 2020-12-13 NOTE — ED NOTES
The patient had urinated all over the bed. The patient was cooperative and was cleaned up and was able to follow commands. The patient did go to the bathroom with this nurse and did provide a urine sample.      Severiano Avers, RN  12/12/20 3137

## 2020-12-13 NOTE — ED NOTES
Report received from [facility] , care assumed at this time, patient resting with eyes close, no signs of distress noted, sitter at bedside.       Roxann Johnson RN  75/14/81 1086

## 2020-12-13 NOTE — ED NOTES
Pt is resting in bed with eyes closed at this time, sitter at bedside, pt in no acute distress at this time     Anna Santamaria, MOLINA  26/91/35 6188

## 2020-12-13 NOTE — ED NOTES
The patients father Fátima No ( [phone]) was updated and told we would call later with more info     Adri Reilly RN  12/12/20 8320

## 2020-12-13 NOTE — ED NOTES
Pt resting comfortably in bed. Sitter remains at bedside 1:1. No distress noted at this time.      Orestes Segura RN  12/13/20 0727

## 2020-12-13 NOTE — ED NOTES
Rounding of the patient was done and the patient was sleeping in the bed. The patient constant observer is at bedside and has no concerns of patient safety. Every 15 minute visual checks continued.      Verl Schaumann, RN  12/13/20 9200

## 2020-12-13 NOTE — ED NOTES
Pt resting comfortably in bed. Sitter remains at bedside 1:1. No distress noted at this time.      Emeli Jennings RN  12/13/20 6776

## 2020-12-13 NOTE — ED NOTES
Continue to be unable to awaken patient, she has still been sleeping soundly, doesn't respond to questions asked of her.      Steve Greer RN  31/83/31 7753

## 2020-12-13 NOTE — ED NOTES
Patient remains resting comfortably in bed. Sitter at bedside. No distress noted.      [facility] Eder RN  60/25/59 5113

## 2020-12-13 NOTE — ED PROVIDER NOTES
[facility]      TRIAGE CHIEF COMPLAINT:   Suicidal and Hallucinations      [employer]:  Abraham is a 26 y.o. female that presents by police, EMS with depression suicide ideation hallucinations. Patient's been here before history of bipolar apparently she is been off her medication they found her walking around outside in the cold talk about her father who eats brains. Patient is in the room refusing to talk to me she is sleeping arousable no signs of trauma she is moving all extremities but she stares at me would not talk to me refusing. Vital signs are stable no distress    REVIEW OF SYSTEMS:      Review of Systems   Unable to perform ROS: Psychiatric disorder   Psychiatric/Behavioral: Positive for agitation, behavioral problems, dysphoric mood, hallucinations, sleep disturbance and suicidal ideas. The patient is nervous/anxious and is hyperactive. No past medical history on file. No past surgical history on file. No family history on file. Social History     Socioeconomic History    Marital status: Single     Spouse name: Not on file    Number of children: Not on file    Years of education: Not on file    Highest education level: Not on file   Occupational History    Not on file   Social Needs    Financial resource strain: Not on file    Food insecurity     Worry: Not on file     Inability: Not on file    Transportation needs     Medical: Not on file     Non-medical: Not on file   Tobacco Use    Smoking status: Current Every Day Smoker     Packs/day: 0.50    Smokeless tobacco: Never Used   Substance and Sexual Activity    Alcohol use:  Yes    Drug use: Yes     Types: Marijuana     Comment: daily    Sexual activity: Not on file   Lifestyle    Physical activity     Days per week: Not on file     Minutes per session: Not on file    Stress: Not on file   Relationships    Social connections     Talks on phone: Not on file     Gets together: Not on file Attends Buddhism service: Not on file     Active member of club or organization: Not on file     Attends meetings of clubs or organizations: Not on file     Relationship status: Not on file    Intimate partner violence     Fear of current or ex partner: Not on file     Emotionally abused: Not on file     Physically abused: Not on file     Forced sexual activity: Not on file   Other Topics Concern    Not on file   Social History Narrative    Not on file     No current facility-administered medications for this encounter. Current Outpatient Medications   Medication Sig Dispense Refill    dicyclomine (BENTYL) 20 MG tablet Take 1 tablet by mouth See Admin Instructions One tablet by mouth every 6-8 hours when necessary for abdominal pain or cramping. 20 tablet 0      No Known Allergies  No current facility-administered medications for this encounter. Current Outpatient Medications   Medication Sig Dispense Refill    dicyclomine (BENTYL) 20 MG tablet Take 1 tablet by mouth See Admin Instructions One tablet by mouth every 6-8 hours when necessary for abdominal pain or cramping. 20 tablet 0       Nursing Notes Reviewed    VITAL SIGNS:  ED Triage Vitals   Enc Vitals Group      BP       Pulse       Resp       Temp       Temp src       SpO2       Weight       Height       Head Circumference       Peak Flow       Pain Score       Pain Loc       Pain Edu? Excl. in [address]? PHYSICAL EXAM:  Physical Exam  Vitals signs and nursing note reviewed. Constitutional:       General: She is sleeping. She is not in acute distress. Appearance: Normal appearance. She is well-developed and well-groomed. She is not ill-appearing, toxic-appearing or diaphoretic. Neck:      Musculoskeletal: Full passive range of motion without pain and normal range of motion. Normal range of motion. No edema, erythema, neck rigidity, crepitus, injury, pain with movement or torticollis. Vascular: No JVD.    Abdominal: Tenderness: There is no abdominal tenderness. There is no guarding or rebound. Negative signs include Adamson's sign, Rovsing's sign and McBurney's sign. Neurological:      Mental Status: She is oriented to person, place, and time and easily aroused. GCS: GCS eye subscore is 4. GCS verbal subscore is 1. GCS motor subscore is 6. Cranial Nerves: Cranial nerves are intact. No cranial nerve deficit, dysarthria or facial asymmetry. Sensory: Sensation is intact. No sensory deficit. Motor: Motor function is intact. No weakness, tremor, atrophy, abnormal muscle tone or seizure activity. Comments: Refusing to talk to me but is awake moving all extremities    Psychiatric:         Attention and Perception: Attention normal.         Mood and Affect: Affect is flat. Behavior: Behavior is uncooperative and withdrawn. Behavior is not agitated, slowed, aggressive, hyperactive or combative. Judgment: Judgment is inappropriate.       Comments: Refusing to talk to me or cooperate she is resting bed comfortably sleeping and awakes looks at me but will not talk she is moving all extremities           I have reviewed andinterpreted all of the currently available lab results from this visit (if applicable):    Results for orders placed or performed during the hospital encounter of 12/12/20   CBC Auto Differential   Result Value Ref Range    WBC 8.9 4.0 - 10.5 K/CU MM    RBC 4.28 4.2 - 5.4 M/CU MM    Hemoglobin 12.9 12.5 - 16.0 GM/DL    Hematocrit 39.7 37 - 47 %    MCV 92.8 78 - 100 FL    MCH 30.1 27 - 31 PG    MCHC 32.5 32.0 - 36.0 %    RDW 14.2 11.7 - 14.9 %    Platelets 561 811 - 518 K/CU MM    MPV 9.7 7.5 - 11.1 FL    Differential Type AUTOMATED DIFFERENTIAL     Segs Relative 70.2 (H) 36 - 66 %    Lymphocytes % 16.1 (L) 24 - 44 %    Monocytes % 10.5 (H) 0 - 4 %    Eosinophils % 2.5 0 - 3 %    Basophils % 0.3 0 - 1 %    Segs Absolute 6.3 K/CU MM    Lymphocytes Absolute 1.4 K/CU MM    Monocytes Absolute 0.9 K/CU MM    Eosinophils Absolute 0.2 K/CU MM    Basophils Absolute 0.0 K/CU MM    Nucleated RBC % 0.0 %    Total Nucleated RBC 0.0 K/CU MM    Total Immature Neutrophil 0.04 K/CU MM    Immature Neutrophil % 0.4 0 - 0.43 %   CMP   Result Value Ref Range    Sodium 127 (L) 135 - 145 MMOL/L    Potassium 3.6 3.5 - 5.1 MMOL/L    Chloride 92 (L) 99 - 110 mMol/L    CO2 24 21 - 32 MMOL/L    BUN 8 6 - 23 MG/DL    CREATININE 0.8 0.6 - 1.1 MG/DL    Glucose 119 (H) 70 - 99 MG/DL    Calcium 9.0 8.3 - 10.6 MG/DL    Alb 4.3 3.4 - 5.0 GM/DL    Total Protein 7.4 6.4 - 8.2 GM/DL    Total Bilirubin 0.3 0.0 - 1.0 MG/DL    ALT 5 (L) 10 - 40 U/L    AST 14 (L) 15 - 37 IU/L    Alkaline Phosphatase 48 40 - 129 IU/L    GFR Non-African American >60 >60 mL/min/1.73m2    GFR African American >60 >60 mL/min/1.73m2    Anion Gap 11 4 - 16   Salicylate Level   Result Value Ref Range    Salicylate Lvl <6.0 (L) 15 - 30 MG/DL    DOSE AMOUNT DOSE AMT. GIVEN - UNKNOWN     DOSE TIME DOSE TIME GIVEN - UNKNOWN    Acetaminophen Level   Result Value Ref Range    Acetaminophen Level <5.0 (L) 15 - 30 ug/ml    DOSE AMOUNT DOSE AMT. GIVEN - UNKNOWN     DOSE TIME DOSE TIME GIVEN - UNKNOWN    ETOH Blood   Result Value Ref Range    Alcohol Scrn <0.01 <0.01 %WT/VOL   HCG Serum, Quantitative   Result Value Ref Range    hCG Quant 0.7 UIU/ML   TSH without Reflex   Result Value Ref Range    TSH, High Sensitivity 0.862 0.270 - 4.20 uIu/ml        Radiographs (if obtained):  [] The following radiograph was interpreted by myself in the absence of a radiologist:  [x] Radiologist's Report Reviewed:    EKG (if obtained): (All EKG's are interpreted by myself in the absence of a cardiologist)    MDM:    Patient here depression suicide ideation and bipolar mental health problem.   Again she has a history of this she is here frequently with these complaints patient brought in by EMS and police pink slipped by them due to walking around in the cold talk about her father who eats brains. Patient on arrival is sleeping she is arousable she will look at me but refusing to talk to me. I do not see any signs of trauma her feet are dirty where she is been walking outside barefoot it appears she is not hypothermic vital signs are stable consult to mental health patient pink slipped will likely need placement will get labs and Covid test.  Patient likely needs placement. Patient awaiting labs and medical clearance, will likely need placement, will sign out patient to Dr. Leigha Rojas. CLINICAL IMPRESSION:  Final diagnoses:   Depression with suicidal ideation   Hallucinations   Mental health problem       (Please note that portions of this note may have been completed with a voice recognition program. Efforts were made to edit the dictations but occasionally words aremis-transcribed.)    DISPOSITION REFERRAL (if applicable):  No follow-up provider specified.     DISPOSITION MEDICATIONS (if applicable):  New Prescriptions    No medications on file          Bari Coffey, [facility], DO  12/12/20 2055       Bari Coffey, DO  12/12/20 2132       Bari Coffey, DO  12/12/20 2153

## 2020-12-13 NOTE — ED NOTES
Tried to awaken patient, she doesn't respond. Encouraged drinking H2O as urine specimen still is to be obtained.      Abilio Coe RN  33/12/51 1953

## 2020-12-13 NOTE — ED NOTES
Rounding of the patient was done and the patient was sleeping in the bed. The patient constant observer is at bedside and has no concerns of patient safety. Every 15 minute visual checks continued.      Se Garcia RN  12/12/20 4937

## 2020-12-13 NOTE — ED PROVIDER NOTES
Emergency Department Encounter    Patient: Denia Bain  MRN: [id number]  : 1995  Date of Evaluation: 2020  ED Provider:  Shania Garcia    Briefly, Denia Bain is a 26 y.o. female presented to the emergency department for suicidal ideation and hallucinations. Patient has been seen in this emergency department multiple times for similar complaints. Patient is tested positive for cocaine and cannabinoids. Alcohol level is negative. Sodium level is mildly decreased from baseline. Patient was given normal saline in the emergency department. Patient is otherwise medically cleared and Covid is negative. Patient was evaluated by mental health crisis intervention and recommended for inpatient treatment. We will continue to monitor while in the emergency department.     I have reviewed and interpreted all of the currently available lab results from this visit (if applicable)  Results for orders placed or performed during the hospital encounter of 20   CBC Auto Differential   Result Value Ref Range    WBC 8.9 4.0 - 10.5 K/CU MM    RBC 4.28 4.2 - 5.4 M/CU MM    Hemoglobin 12.9 12.5 - 16.0 GM/DL    Hematocrit 39.7 37 - 47 %    MCV 92.8 78 - 100 FL    MCH 30.1 27 - 31 PG    MCHC 32.5 32.0 - 36.0 %    RDW 14.2 11.7 - 14.9 %    Platelets 651 807 - 749 K/CU MM    MPV 9.7 7.5 - 11.1 FL    Differential Type AUTOMATED DIFFERENTIAL     Segs Relative 70.2 (H) 36 - 66 %    Lymphocytes % 16.1 (L) 24 - 44 %    Monocytes % 10.5 (H) 0 - 4 %    Eosinophils % 2.5 0 - 3 %    Basophils % 0.3 0 - 1 %    Segs Absolute 6.3 K/CU MM    Lymphocytes Absolute 1.4 K/CU MM    Monocytes Absolute 0.9 K/CU MM    Eosinophils Absolute 0.2 K/CU MM    Basophils Absolute 0.0 K/CU MM    Nucleated RBC % 0.0 %    Total Nucleated RBC 0.0 K/CU MM    Total Immature Neutrophil 0.04 K/CU MM    Immature Neutrophil % 0.4 0 - 0.43 %   CMP   Result Value Ref Range    Sodium 127 (L) 135 - 145 MMOL/L    Potassium 3.6 3.5 - 5.1 MMOL/L Chloride 92 (L) 99 - 110 mMol/L    CO2 24 21 - 32 MMOL/L    BUN 8 6 - 23 MG/DL    CREATININE 0.8 0.6 - 1.1 MG/DL    Glucose 119 (H) 70 - 99 MG/DL    Calcium 9.0 8.3 - 10.6 MG/DL    Alb 4.3 3.4 - 5.0 GM/DL    Total Protein 7.4 6.4 - 8.2 GM/DL    Total Bilirubin 0.3 0.0 - 1.0 MG/DL    ALT 5 (L) 10 - 40 U/L    AST 14 (L) 15 - 37 IU/L    Alkaline Phosphatase 48 40 - 129 IU/L    GFR Non-African American >60 >60 mL/min/1.73m2    GFR African American >60 >60 mL/min/1.73m2    Anion Gap 11 4 - 16   Salicylate Level   Result Value Ref Range    Salicylate Lvl <8.0 (L) 15 - 30 MG/DL    DOSE AMOUNT DOSE AMT. GIVEN - UNKNOWN     DOSE TIME DOSE TIME GIVEN - UNKNOWN    Acetaminophen Level   Result Value Ref Range    Acetaminophen Level <5.0 (L) 15 - 30 ug/ml    DOSE AMOUNT DOSE AMT.  GIVEN - UNKNOWN     DOSE TIME DOSE TIME GIVEN - UNKNOWN    ETOH Blood   Result Value Ref Range    Alcohol Scrn <0.01 <0.01 %WT/VOL   HCG Serum, Quantitative   Result Value Ref Range    hCG Quant 0.7 UIU/ML   Urinalysis   Result Value Ref Range    Color, UA STRAW (A) YELLOW    Clarity, UA HAZY (A) CLEAR    Glucose, Urine NEGATIVE NEGATIVE MG/DL    Bilirubin Urine NEGATIVE NEGATIVE MG/DL    Ketones, Urine NEGATIVE NEGATIVE MG/DL    Specific Gravity, UA 1.005 1.001 - 1.035    Blood, Urine SMALL (A) NEGATIVE    pH, Urine 7.0 5.0 - 8.0    Protein, UA NEGATIVE NEGATIVE MG/DL    Urobilinogen, Urine NORMAL 0.2 - 1.0 MG/DL    Nitrite Urine, Quantitative NEGATIVE NEGATIVE    Leukocyte Esterase, Urine TRACE (A) NEGATIVE    RBC, UA 2 0 - 6 /HPF    WBC, UA 7 (H) 0 - 5 /HPF    Bacteria, UA NEGATIVE NEGATIVE /HPF    Squam Epithel, UA 3 /HPF    Mucus, UA RARE (A) NEGATIVE HPF    Trichomonas, UA NONE SEEN NONE SEEN /HPF   Urine Drug Screen   Result Value Ref Range    Cannabinoid Scrn, Ur UNCONFIRMED POSITIVE (A) NEGATIVE    Amphetamines NEGATIVE NEGATIVE    Cocaine Metabolite UNCONFIRMED POSITIVE (A) NEGATIVE    Benzodiazepine Screen, Urine NEGATIVE NEGATIVE Barbiturate Screen, Ur NEGATIVE NEGATIVE    Opiates, Urine NEGATIVE NEGATIVE    Phencyclidine, Urine NEGATIVE NEGATIVE    Oxycodone NEGATIVE NEGATIVE   TSH without Reflex   Result Value Ref Range    TSH, High Sensitivity 0.862 0.270 - 4.20 uIu/ml   COVID-19    Specimen: Nasopharynx/Oropharynx   Result Value Ref Range    Source THROAT     SARS-CoV-2, NAAT NOT DETECTED       Radiographs (if obtained):    [] Radiologist's Report Reviewed:  No orders to display       MDM:      Clinical Impression:  1. Depression with suicidal ideation    2. Hallucinations    3. Mental health problem      Disposition referral (if applicable):  No follow-up provider specified. Disposition medications (if applicable):  New Prescriptions    No medications on file       Comment: Please note this report has been produced using speech recognition software and may contain errors related to that system including errors in grammar, punctuation, and spelling, as well as words and phrases that may be inappropriate. Efforts were made to edit the dictations.        Cinthia Montenegro,   12/12/20 2794

## 2020-12-13 NOTE — ED NOTES
Called Meagan AUGUSTE @ [facility], she will take over transport arrangements for patient.      Tatiana Giordano RN  39/95/96 6248

## 2020-12-13 NOTE — ED NOTES
Patient resting comfortably in bed with eyes closed, sitter at bedside, no signs of distress at this time.       Nena Gross RN  22/76/31 9513

## 2020-12-13 NOTE — ED NOTES
Spoke with Charito Lira, Charge RN @ [facility], she tells me Dr Nick Huggins would be willing to review chart for possible admission with sodium level corrected. There are available beds.      Nina Hook RN  74/52/11 6361

## 2020-12-13 NOTE — ED NOTES
Rounding of the patient was done and the patient was lying in the bed. The patient constant observer is at bedside and has no concerns of patient safety. Every 15 minute visual checks continued.      Adri Reilly RN  12/12/20 0309

## 2020-12-13 NOTE — ED PROVIDER NOTES
60-year-old female presents for psychiatric evaluation. She presents for suicidal thoughts. She was seen by previous physician. Screening labs are obtained. She is deemed medically clear. She has been evaluated by mental crisis team and recommendations for inpatient admission. She is currently calm and cooperative. She is currently awaiting placement. Pt has been evaluated by mental health crisis team and plan is for inpatient admission. She will be transferred to [facility] in stable condition.     Sara Deras  12/13/2020  7:15 AM        Sara Deras MD  12/13/20 4666       Sara Deras MD  12/18/20 2046

## 2020-12-13 NOTE — ED NOTES
From: Jessica Reeves  To: Ann Marie Juárez MD  Sent: 6/26/2017 10:29 AM CDT  Subject: Visit Follow-up Question    My next appt with Dr Nghia Lockett is this coming Thurs (6/29) afternoon to check the progress of my wounds.  I received Wound Care this morning & The patient is calm but will not answer questions when asked.      Radha Rosado RN  12/12/20 4769

## 2020-12-13 NOTE — ED NOTES
Provisional Diagnosis:  Probable Psychosis  History of Bipolar Disorder  Possible Noncompliance with Rx Meds and Treatment  Polysubstance Abuse per Urine Drug Screen Results    Psychosocial and Contextual Factors:   Per Dr. Yulia Orosco, ED Physician:  \". ..presents by police, EMS with depression suicide ideation hallucinations. Patient's been here before history of bipolar apparently she is been off her medication they found her walking around outside in the cold talk about her father who eats brains. Patient is in the room refusing to talk to me she is sleeping arousable no signs of trauma she is moving all extremities but she stares at me would not talk to me refusing. Vital signs are stable no distress\"    Patient was brought to the Emergency Room per Law Enforcement as she was found walking outside with no protection from the cold, bare feet, etc.     Thought to be hallucinating and not compliant with Rx Meds. ED RN Sammy Childers says he spoke with father of patient and shared with me that father noted patient to be \"running in and out of the house and not taking her meds\"    Father, (loan Childers, ED RN), provided no other history and was then occupied in a social activity. Jacoby looks extremely disheveled and demonstrates very poor self care. No eye contact, doesn't answer questions asked of her. Seems to have very poor judgment and very poor insight. Appears unable to manage her own basic, personal needs. Would benefit from psychiatric care for observation, evaluation and safety due to high risk for self harm or harm to others in current state. Dr. Leigha Rojas, ED Phsycian supports Involuntary Psychiatric Admission for Obsercvation, Evaluation and Safety. Will seek placement. C-SSRS Summary:     Patient: As above. Family: No other history is shared. Agency: No history provided. Present Suicidal Behavior:     Verbal: Not shared. Attempt: Not shared.     Past Suicidal Behavior:     Verbal: Unknown    Attempt: Unknown      Self-Injurious/Self-Mutilation: Unknown    Trauma Identified: Unknown      Protective Factors:    None are identified at this time. Risk Factors:    Probable Psychosis  History of Bipolar Disorder  Possible Noncompliance with Rx Meds and Treatment  Polysubstance Abuse per Urine Drug Screen Results    Clinical Summary:    AS above.   Seeking Placement    Electronically signed by Abiloi Coe RN on 53/01/7233 at 15:56 PM     Abilio Coe RN  81/68/42 8586

## 2020-12-13 NOTE — ED NOTES
Report called to [facility], nurse at [facility].      Maty Masters RN  12/13/20 7593

## 2020-12-19 ENCOUNTER — HOSPITAL ENCOUNTER (OUTPATIENT)
Age: 25
Setting detail: SPECIMEN
Discharge: HOME | End: 2020-12-19

## 2020-12-19 LAB
CHOLESTEROL: 186 MG/DL
HDLC SERPL-MCNC: 51 MG/DL
LDL CHOLESTEROL DIRECT: 118 MG/DL
TRIGL SERPL-MCNC: 144 MG/DL

## 2020-12-19 PROCEDURE — 83721 ASSAY OF BLOOD LIPOPROTEIN: CPT

## 2020-12-19 PROCEDURE — 80061 LIPID PANEL: CPT

## 2020-12-19 PROCEDURE — 36415 COLL VENOUS BLD VENIPUNCTURE: CPT

## 2022-05-10 ENCOUNTER — HOSPITAL ENCOUNTER (EMERGENCY)
Age: 27
Discharge: PSYCHIATRIC OR SUBSTANCE USE FACILITY | End: 2022-05-11
Attending: STUDENT IN AN ORGANIZED HEALTH CARE EDUCATION/TRAINING PROGRAM
Payer: MEDICAID

## 2022-05-10 VITALS
SYSTOLIC BLOOD PRESSURE: 105 MMHG | RESPIRATION RATE: 14 BRPM | BODY MASS INDEX: 21.34 KG/M2 | OXYGEN SATURATION: 99 % | HEART RATE: 76 BPM | HEIGHT: 64 IN | WEIGHT: 125 LBS | DIASTOLIC BLOOD PRESSURE: 65 MMHG | TEMPERATURE: 98.6 F

## 2022-05-10 DIAGNOSIS — F29 PSYCHOSIS, UNSPECIFIED PSYCHOSIS TYPE (HCC): Primary | ICD-10-CM

## 2022-05-10 DIAGNOSIS — F19.10 POLYSUBSTANCE ABUSE (HCC): ICD-10-CM

## 2022-05-10 LAB
ACETAMINOPHEN LEVEL: <5 UG/ML (ref 15–30)
ALBUMIN SERPL-MCNC: 4.4 GM/DL (ref 3.4–5)
ALCOHOL SCREEN SERUM: <0.01 %WT/VOL
ALP BLD-CCNC: 51 IU/L (ref 40–128)
ALT SERPL-CCNC: 7 U/L (ref 10–40)
AMMONIA: 33 UMOL/L (ref 11–51)
AMPHETAMINES: NEGATIVE
ANION GAP SERPL CALCULATED.3IONS-SCNC: 12 MMOL/L (ref 4–16)
AST SERPL-CCNC: 14 IU/L (ref 15–37)
BACTERIA: NEGATIVE /HPF
BARBITURATE SCREEN URINE: NEGATIVE
BASOPHILS ABSOLUTE: 0.1 K/CU MM
BASOPHILS RELATIVE PERCENT: 0.4 % (ref 0–1)
BENZODIAZEPINE SCREEN, URINE: NEGATIVE
BILIRUB SERPL-MCNC: 0.3 MG/DL (ref 0–1)
BILIRUBIN URINE: NEGATIVE MG/DL
BLOOD, URINE: ABNORMAL
BUN BLDV-MCNC: 7 MG/DL (ref 6–23)
CALCIUM SERPL-MCNC: 8.9 MG/DL (ref 8.3–10.6)
CANNABINOID SCREEN URINE: ABNORMAL
CHLORIDE BLD-SCNC: 99 MMOL/L (ref 99–110)
CLARITY: CLEAR
CO2: 24 MMOL/L (ref 21–32)
COCAINE METABOLITE: ABNORMAL
COLOR: YELLOW
CREAT SERPL-MCNC: 0.7 MG/DL (ref 0.6–1.1)
DIFFERENTIAL TYPE: ABNORMAL
DOSE AMOUNT: ABNORMAL
DOSE AMOUNT: ABNORMAL
DOSE TIME: ABNORMAL
DOSE TIME: ABNORMAL
EOSINOPHILS ABSOLUTE: 0 K/CU MM
EOSINOPHILS RELATIVE PERCENT: 0.1 % (ref 0–3)
GFR AFRICAN AMERICAN: >60 ML/MIN/1.73M2
GFR NON-AFRICAN AMERICAN: >60 ML/MIN/1.73M2
GLUCOSE BLD-MCNC: 93 MG/DL (ref 70–99)
GLUCOSE, URINE: NEGATIVE MG/DL
HCG QUALITATIVE: NEGATIVE
HCT VFR BLD CALC: 36.4 % (ref 37–47)
HEMOGLOBIN: 12.3 GM/DL (ref 12.5–16)
HYALINE CASTS: 0 /LPF
IMMATURE NEUTROPHIL %: 0.6 % (ref 0–0.43)
KETONES, URINE: NEGATIVE MG/DL
LEUKOCYTE ESTERASE, URINE: NEGATIVE
LYMPHOCYTES ABSOLUTE: 2.1 K/CU MM
LYMPHOCYTES RELATIVE PERCENT: 15.1 % (ref 24–44)
MCH RBC QN AUTO: 29.9 PG (ref 27–31)
MCHC RBC AUTO-ENTMCNC: 33.8 % (ref 32–36)
MCV RBC AUTO: 88.3 FL (ref 78–100)
MONOCYTES ABSOLUTE: 1.1 K/CU MM
MONOCYTES RELATIVE PERCENT: 7.5 % (ref 0–4)
MUCUS: ABNORMAL HPF
NITRITE URINE, QUANTITATIVE: NEGATIVE
NUCLEATED RBC %: 0 %
OPIATES, URINE: NEGATIVE
OXYCODONE: NEGATIVE
PDW BLD-RTO: 13.2 % (ref 11.7–14.9)
PH, URINE: 7 (ref 5–8)
PHENCYCLIDINE, URINE: NEGATIVE
PLATELET # BLD: 245 K/CU MM (ref 140–440)
PMV BLD AUTO: 9.8 FL (ref 7.5–11.1)
POTASSIUM SERPL-SCNC: 4.6 MMOL/L (ref 3.5–5.1)
PROTEIN UA: NEGATIVE MG/DL
RBC # BLD: 4.12 M/CU MM (ref 4.2–5.4)
RBC URINE: <1 /HPF (ref 0–6)
SALICYLATE LEVEL: <0.3 MG/DL (ref 15–30)
SARS-COV-2, NAAT: NOT DETECTED
SEGMENTED NEUTROPHILS ABSOLUTE COUNT: 10.8 K/CU MM
SEGMENTED NEUTROPHILS RELATIVE PERCENT: 76.3 % (ref 36–66)
SODIUM BLD-SCNC: 135 MMOL/L (ref 135–145)
SOURCE: NORMAL
SPECIFIC GRAVITY UA: 1.01 (ref 1–1.03)
SQUAMOUS EPITHELIAL: <1 /HPF
TOTAL IMMATURE NEUTOROPHIL: 0.08 K/CU MM
TOTAL NUCLEATED RBC: 0 K/CU MM
TOTAL PROTEIN: 7.4 GM/DL (ref 6.4–8.2)
TRICHOMONAS: ABNORMAL /HPF
TSH HIGH SENSITIVITY: 0.91 UIU/ML (ref 0.27–4.2)
UROBILINOGEN, URINE: 0.2 MG/DL (ref 0.2–1)
WBC # BLD: 14.2 K/CU MM (ref 4–10.5)
WBC UA: <1 /HPF (ref 0–5)

## 2022-05-10 PROCEDURE — G0480 DRUG TEST DEF 1-7 CLASSES: HCPCS

## 2022-05-10 PROCEDURE — 82140 ASSAY OF AMMONIA: CPT

## 2022-05-10 PROCEDURE — 80307 DRUG TEST PRSMV CHEM ANLYZR: CPT

## 2022-05-10 PROCEDURE — 84703 CHORIONIC GONADOTROPIN ASSAY: CPT

## 2022-05-10 PROCEDURE — 87635 SARS-COV-2 COVID-19 AMP PRB: CPT

## 2022-05-10 PROCEDURE — 99285 EMERGENCY DEPT VISIT HI MDM: CPT

## 2022-05-10 PROCEDURE — 81001 URINALYSIS AUTO W/SCOPE: CPT

## 2022-05-10 PROCEDURE — 6360000002 HC RX W HCPCS: Performed by: STUDENT IN AN ORGANIZED HEALTH CARE EDUCATION/TRAINING PROGRAM

## 2022-05-10 PROCEDURE — 85025 COMPLETE CBC W/AUTO DIFF WBC: CPT

## 2022-05-10 PROCEDURE — 6360000002 HC RX W HCPCS

## 2022-05-10 PROCEDURE — 96372 THER/PROPH/DIAG INJ SC/IM: CPT

## 2022-05-10 PROCEDURE — 84443 ASSAY THYROID STIM HORMONE: CPT

## 2022-05-10 PROCEDURE — 80053 COMPREHEN METABOLIC PANEL: CPT

## 2022-05-10 RX ORDER — LORAZEPAM 2 MG/ML
2 INJECTION INTRAMUSCULAR ONCE
Status: COMPLETED | OUTPATIENT
Start: 2022-05-10 | End: 2022-05-10

## 2022-05-10 RX ORDER — HALOPERIDOL 5 MG/ML
INJECTION INTRAMUSCULAR
Status: COMPLETED
Start: 2022-05-10 | End: 2022-05-10

## 2022-05-10 RX ORDER — HALOPERIDOL 5 MG/ML
5 INJECTION INTRAMUSCULAR ONCE
Status: DISCONTINUED | OUTPATIENT
Start: 2022-05-10 | End: 2022-05-11 | Stop reason: HOSPADM

## 2022-05-10 RX ADMIN — LORAZEPAM 2 MG: 2 INJECTION INTRAMUSCULAR; INTRAVENOUS at 11:50

## 2022-05-10 RX ADMIN — HALOPERIDOL LACTATE 5 MG: 5 INJECTION, SOLUTION INTRAMUSCULAR at 11:49

## 2022-05-10 ASSESSMENT — PAIN - FUNCTIONAL ASSESSMENT
PAIN_FUNCTIONAL_ASSESSMENT: NONE - DENIES PAIN
PAIN_FUNCTIONAL_ASSESSMENT: NONE - DENIES PAIN

## 2022-05-10 NOTE — ED NOTES
Dr Leo Jo at bedside, pt being belligerent, threatened to bite another nurse and 'open up her neck\" security at bedside. Pt being non-compliant. Refusing to change into gown, pt is non-sensical and yelling about \"the jeanie witch trials\" pt returned to her room from restroom after being asked multiple times to change into a gown.      Andre Burdick RN  05/10/22 3417

## 2022-05-10 NOTE — ED PROVIDER NOTES
Emergency Department Encounter    Patient: Humberto Maldonado  MRN: [id number]  : 1995  Date of Evaluation: 5/10/2022  ED Provider:  Joseph Grimes MD    Triage Chief Complaint:   Mental Health Problem    [place]:  Humberto Maldonado is a 23 y.o. female with history of hallucinations, psychosis, polysubstance abuse presenting with police for psychosis. Per their report they were called as patient mother was concerned about patient's safety. Per report patient had a knife in the father was concerned that patient would harm her self as well as patient has had significant psychosis. Patient was responding to internal stimuli on presentation and continually laughing inappropriately. History is very limited as patient only intermittently answers questions. Patient is very confrontational tells one of the nursing staff that she will bite her in the neck. Patient denies SI or HI. When I asked her if she is having hallucinations she said \"that seems right\". Patient does not respond when I ask if she has any history of alcohol or drug abuse or his use recently patient does have a history of polysubstance abuse and meth abuse in the past.  Patient denies any pain. ROS - see HPI, below listed is current ROS at time of my eval:  Unable to obtain full review of systems secondary to psychiatric decompensation    No past medical history on file. No past surgical history on file. No family history on file. Social History     Socioeconomic History    Marital status: Single     Spouse name: Not on file    Number of children: Not on file    Years of education: Not on file    Highest education level: Not on file   Occupational History    Not on file   Tobacco Use    Smoking status: Current Every Day Smoker     Packs/day: 0.50    Smokeless tobacco: Never Used   Vaping Use    Vaping Use: Never used   Substance and Sexual Activity    Alcohol use:  Yes    Drug use: Yes     Types: Marijuana Sharonderrell Mayfield)     Comment: daily    Sexual activity: Not on file   Other Topics Concern    Not on file   Social History Narrative    Not on file     Social Determinants of Health     Financial Resource Strain:     Difficulty of Paying Living Expenses: Not on file   Food Insecurity:     Worried About Running Out of Food in the Last Year: Not on file    Elham of Food in the Last Year: Not on file   Transportation Needs:     Lack of Transportation (Medical): Not on file    Lack of Transportation (Non-Medical): Not on file   Physical Activity:     Days of Exercise per Week: Not on file    Minutes of Exercise per Session: Not on file   Stress:     Feeling of Stress : Not on file   Social Connections:     Frequency of Communication with Friends and Family: Not on file    Frequency of Social Gatherings with Friends and Family: Not on file    Attends Yarsani Services: Not on file    Active Member of [address] [place] [address] [employer] or Organizations: Not on file    Attends Club or Organization Meetings: Not on file    Marital Status: Not on file   Intimate Partner Violence:     Fear of Current or Ex-Partner: Not on file    Emotionally Abused: Not on file    Physically Abused: Not on file    Sexually Abused: Not on file   Housing Stability:     Unable to Pay for Housing in the Last Year: Not on file    Number of [place] in the Last Year: Not on file    Unstable Housing in the Last Year: Not on file     No current facility-administered medications for this encounter. Current Outpatient Medications   Medication Sig Dispense Refill    dicyclomine (BENTYL) 20 MG tablet Take 1 tablet by mouth See Admin Instructions One tablet by mouth every 6-8 hours when necessary for abdominal pain or cramping.  20 tablet 0     No Known Allergies    Nursing Notes Reviewed    Physical Exam:  Triage VS:    ED Triage Vitals   Enc Vitals Group      BP       Pulse       Resp       Temp       Temp src       SpO2       Weight       Height       Head Circumference Peak Flow       Pain Score       Pain Loc       Pain Edu? Excl. in [address]? My pulse ox interpretation is - normal    General appearance: Psychotic, laughing inappropriately, no acute distress  Skin:  Warm. Dry. Eye:  Extraocular movements intact. Pupils 4 mm reactive bilaterally  Ears, nose, mouth and throat:  Oral mucosa moist   Neck:  Trachea midline. Extremity:  No swelling. Normal ROM     Heart:  Regular rate and rhythm, normal S1 & S2, no extra heart sounds. Perfusion:  intact  Respiratory:  Lungs clear to auscultation bilaterally. Respirations nonlabored. Abdominal:  Normal bowel sounds. Soft. Nontender. Non distended. Back:  No CVA tenderness to palpation     Neurological:  Alert and oriented times 3. No focal neuro deficits. Psychiatric: Laughing inappropriately, responding to internal stimuli, pressured speech    I have reviewed and interpreted all of the currently available lab results from this visit (if applicable):  No results found for this visit on 05/10/22. Radiographs (if obtained):  Radiologist's Report Reviewed:  No results found. MDM:    42-year-old female presenting for mental health evaluation. History missing above. Patient is tachycardic to 126 on presentation but otherwise vitals are reassuring and patient afebrile satting well on room air. On physical exam lungs are clear to auscultation, cardiac exam is reassuring, abdomen soft and nontender, neuro exam is nonfocal.  Patient is alert and oriented but is laughing inappropriately, responding to internal stimuli and making threats to staff. Patient states she will willingly take Ativan and Haldol which was given IM in the ED. Patient changed gown, sitter is at bedside, psych precautions are in place, pink slip is performed. Psych labs obtained. CBC reveals a white count of 14,000. Patient has been elevated in the bands. Patient denies any fevers and likely reactive in nature. Hemoglobin is stable for patient. CMP is reassuring.,  Salicylate, Tylenol level are negative. Ammonia and TSH are within normal limits. Pregnancy testing is negative. UA shows no signs of infection. UDS is positive for cannabinoid and cocaine. Currently pending rapid COVID. Patient has no signs of infection, no tenderness palpation along spine, no signs of meningismus denies headache, cough or sputum production, abdominal pain, rashes or lesions, lungs are clear to auscultation, abdomen soft nontender. Do not believe white count secondary infection likely reactive in nature. Patient is medically clear at this time. Patient evaluated by behavioral health and believes patient would benefit from inpatient psychiatric stay. Currently pending placement. Patient signed out to oncoming physician Dr. Prema Branham. Clinical Impression:  1. Psychosis, unspecified psychosis type ([facility], [address]          Comment: Please note this report has been produced using speech recognition software and may contain errors related to that system including errors in grammar, punctuation, and spelling, as well as words and phrases that may be inappropriate. Efforts were made to edit the dictations.         Jeanette Webster MD  05/10/22 2004

## 2022-05-10 NOTE — ED NOTES
Pt refusing to answer any of MSW questions  Pt laughing innappropriately, responding to internal stimuli, talking and making noises sporadically almost like tics  Pt grabbed MSTRACI lofton and told MSW that the picture on the badge wasn't her because of her ears, when MSW asked whats wrong with her ears, pt stated \"oops, I didn't mean to say that out loud\". Security made pt let go of LUCRECIA badsridhar, pt difficult to be redirectable and was unwilling to let go of badge   MSW left and told pt that we could talk more later   Pt then asked MSW Misty Yahir you a student or do you really make money for coming in here and just standing next to people\" MSW told pt that she was trying to ask pt questions but pt never answered any of them.

## 2022-05-10 NOTE — ED NOTES
SOS called d/t patient agitated when asked to change into green gown.  Security at bedside     Catalina Willis, RN  05/10/22 6746

## 2022-05-10 NOTE — ED NOTES
Pt changed herself out of her clothing and into green gown. Belongings inventoried and secured by security staff. Sitter remains at bedside. Pt sitting on bed calmly at this time.  No distress noted     Mahi Veliz RN  05/10/22 [id number]

## 2022-05-10 NOTE — ED NOTES
Sitter reports patient stood up out of bed and urinated on the floor. Pt cleaned and floor cleaned , EVS called to mop the floor.  Pt back in bed     Patrick Drummond RN  05/10/22 4586

## 2022-05-10 NOTE — ED NOTES
Pt urinated all over floor and self states \"sorry\" pt is selectively responsive to staff, urine cleaned off floor and patient, pt did use wet wipes to clean herself off. Pt became upset with this RN while getting vitals and began parroting back everything that was said in a high pitched voice. pt sitter remains at bedside, no needs reported at this time. Sitter is ordering pt a tray.      Kevin Pollard RN  05/10/22 6799

## 2022-05-10 NOTE — ED NOTES
Pt father called to give more information re pt condition     Per father, pt is currently off her meds. States that sometimes when she is off her meds she uses drugs but he cannot confirm if she has recently or not. States that she doesn't need inpatient psych and that she just needs to be on her medications. States she has a job at [employer] starting on the 20th and he doesn't want her to loose her job because shes in inpatient psych. States that she is having a \"break from reality\", she thought her dad was trying to hurt her, and he found her in the neighbors trash with a knife. States he called the police to get her help. MSW explained the treatment processes and explained that we will need to wait for the meds to wear off before pt can be assessed by psychiatry. Explained that depending on the assessment we will choose either inpatient or outpatient treatment. Donaldson Marker ([phone]) wants updates when available.

## 2022-05-10 NOTE — ED TRIAGE NOTES
Pt brought in with EMS and PD, pt was at [facility] telling them she wants help bc she took an unknown substance, [facility] told her that they could not help. Called PD, pt uncooperative with ems. Pt placed in room, stating she doesn't want any shots.

## 2022-05-10 NOTE — ED NOTES
Report given to [facility] and care transferred at this time. Sitter remains at bedside. Pt resting comfortably and quietly in bed with eyes closed, respirations even and unlabored.  No distress noted     Nohemi Nielson RN  05/10/22 1932

## 2022-05-11 NOTE — ED NOTES
Pt accepted, pink slip faxed, called [phone], updated [facility] of ETA, will call  to transport for nurse to nurse     Atul Varner, MOLINA  05/10/22 6850

## 2022-05-11 NOTE — ED NOTES
..Chief Complaint:      psychosis    Provisional Diagnosis:   Probable Psychosis  Hx of bipolar per records   Probable auditory and visual hallucinations  Substance abuse per UDS  Appears Impulsive      Risk, Psychosocial and Contextual Factors:   Unknown support system       Current  Treatment:     unknown    Present Suicidal Behavior:    Verbal: denies     Attempt: denies       Access to Weapons:  unknown    C-SSRS Current Suicide Risk: Low, Moderate or High:      C-SSRS Suicide Screening - 1) Within the past month, have you wished you were dead or wished you could go to sleep and not wake up? : No  2) Have you actually had any thoughts of killing yourself? :  (pt stated \"should I have thoughts to kill myself\") 6) Have you ever done anything, started to do anything, or prepared to do anything to end your life? :  (pt stated \"did i try to kill myself\")  C-SSRS Risk Assessment - Suicidal and Self-Injurious Behavior : Denies suicidal and self-injurious behavior  Suicidal Ideation (Most Severe in Past Month): Denies suicidal ideation  Activating Events (Recent): Other (comment) ( pt states she is here because \"I had to pee\") Treatment History: Previous psychiatric diagnosis and treatments  Clinical Status (Recent): Substance abuse or dependence;  Highly impulsive behavior; Agitation or severe anxiety; Aggressive behavior towards others  Other Risk Factors:  (psychosis)      Past Suicidal Behavior:    Verbal: hx of per records     Attempts: unknown      Self-Injurious/Self-Mutilation: unknown      Traumatic Event Within Past 2 Weeks:   unknown       Current Abuse:   unknown      Legal: unknown    Violence: hx of per records, mood labile      Protective Factors:    None identified at this time    Housing:unknown      Risk Factors:   Probable Psychosis  Hx of bipolar per records   Probable auditory and visual hallucinations  Substance abuse per UDS  Appears Impulsive      Clinical Summary:      Pt presents with labile mood, indignant, bizzare    At start of assessment pt was soft spoken, calm and attempting to answer questions, she denies SI/HI but then as questioning continued she became defensive and deflecting questions, pt reports she is here because \"I had to pee\", when confronted with information about her dad was concerned for her safety she became agitated stated \"he was going to hurt me\" then would repeat questions back as her answer, started becoming verbally abusive and increasingly agitated, pt responding to internal stimuli as argument was going on with no one responding to pt, pt asking \"what words are you going to put in the note\" attempts at explaining assessment unsuccessful as pt started speaking nonsensical, pt started yelling at this nurse stating \"get out my fucking face you fucking bitch\",     Pt appears to not be in touch with reality at this time    Pt demonstrates poor insight and poor judgement    Pt unable to assure safety of self or others in current state    Probable that pt is noncompliant with medication, also reported by pt's father to be noncompliant     Due to pt's uncooperative behaviors with this nurse, will provide previous staff's encounters with pt this ED visit to provide more documentation for pt's need of inpatient stabilization    Per Dr Omayra Woodson ED Physician  Lois Rinne is a 23 y.o. female with history of hallucinations, psychosis, polysubstance abuse presenting with police for psychosis. Per their report they were called as patient mother was concerned about patient's safety. Per report patient had a knife in the father was concerned that patient would harm her self as well as patient has had significant psychosis. Patient was responding to internal stimuli on presentation and continually laughing inappropriately. History is very limited as patient only intermittently answers questions.   Patient is very confrontational tells one of the nursing staff that she will bite her in the neck. Patient denies SI or HI. When I asked her if she is having hallucinations she said \"that seems right\". Patient does not respond when I ask if she has any history of alcohol or drug abuse or his use recently patient does have a history of polysubstance abuse and meth abuse in the past.  Patient denies any pain. \"    Per [facility]  \"Pt refusing to answer any of MSW questions  Pt laughing innappropriately, responding to internal stimuli, talking and making noises sporadically almost like tics  Pt grabbed MSW badsridhar and told MSW that the picture on the badge wasn't her because of her ears, when MSW asked whats wrong with her ears, pt stated \"oops, I didn't mean to say that out loud\". Security made pt let go of MSW badge, pt difficult to be redirectable and was unwilling to let go of badge   MSW left and told pt that we could talk more later   Pt then asked MSW Willean Specter you a student or do you really make money for coming in here and just standing next to people\" MSW told pt that she was trying to ask pt questions but pt never answered any of them. \"  \"Pt father called to give more information re pt condition    Per father, pt is currently off her meds. States that sometimes when she is off her meds she uses drugs but he cannot confirm if she has recently or not. States that she doesn't need inpatient psych and that she just needs to be on her medications. States she has a job at [employer] starting on the 20th and he doesn't want her to loose her job because shes in inpatient psych. States that she is having a \"break from reality\", she thought her dad was trying to hurt her, and he found her in the neighbors trash with a knife. States he called the police to get her help. MSW explained the treatment processes and explained that we will need to wait for the meds to wear off before pt can be assessed by psychiatry. Explained that depending on the assessment we will choose either inpatient or outpatient treatment. \"    Per [facility] ED RN  \"Dr Keiko Weston at bedside, pt being belligerent, threatened to bite another nurse and 'open up her neck\" security at bedside. Pt being non-compliant. Refusing to change into gown, pt is non-sensical and yelling about \"the jeanie witch trials\" pt returned to her room from restroom after being asked multiple times to change into a gown. \"            Level of Care Disposition:      Consulted with medical provider. Patient is medically stabilized. Consulted with patients RN about abnormalities or medical concerns. No abnormalities or medical concerns noted.   Consulted with Dr Kee Lopez Patient to be admitted to psychiatric facility for observation, evaluation and safety  Will seek placement            Kierra Rios RN  05/10/22 2055

## 2022-05-11 NOTE — ED PROVIDER NOTES
Patient signed out to me by Dr. Cassie Peñaloza,    26yof with psychosis, hallucinations, responding to external stimuli, decompensated. Does have psych history as well as h/o substance abuse. Awaiting placement by .        Luis M Falcon MD  05/10/22 2028        Accepted to [facility], Dr. Kingsley Cobian, plan for transfer     Luis M Falcon MD  05/10/22 [phone]

## 2022-05-11 NOTE — ED NOTES
Nurse to nurse given to [facility], RN  05/10/22 1217

## 2022-12-28 ENCOUNTER — HOSPITAL ENCOUNTER (EMERGENCY)
Age: 27
Discharge: HOME | End: 2022-12-28
Attending: EMERGENCY MEDICINE
Payer: MEDICAID

## 2022-12-28 VITALS
DIASTOLIC BLOOD PRESSURE: 80 MMHG | TEMPERATURE: 98 F | OXYGEN SATURATION: 100 % | RESPIRATION RATE: 16 BRPM | BODY MASS INDEX: 20.6 KG/M2 | WEIGHT: 120 LBS | HEART RATE: 97 BPM | SYSTOLIC BLOOD PRESSURE: 117 MMHG

## 2022-12-28 DIAGNOSIS — R82.5 POSITIVE URINE DRUG SCREEN: ICD-10-CM

## 2022-12-28 DIAGNOSIS — N39.0 URINARY TRACT INFECTION WITHOUT HEMATURIA, SITE UNSPECIFIED: ICD-10-CM

## 2022-12-28 DIAGNOSIS — F32.A DEPRESSION, UNSPECIFIED DEPRESSION TYPE: ICD-10-CM

## 2022-12-28 DIAGNOSIS — F48.9 MENTAL HEALTH PROBLEM: Primary | ICD-10-CM

## 2022-12-28 LAB
ACETAMINOPHEN LEVEL: <5 UG/ML (ref 15–30)
ALBUMIN SERPL-MCNC: 4.6 GM/DL (ref 3.4–5)
ALCOHOL SCREEN SERUM: <0.01 %WT/VOL
ALP BLD-CCNC: 48 IU/L (ref 40–129)
ALT SERPL-CCNC: 10 U/L (ref 10–40)
AMPHETAMINES: ABNORMAL
ANION GAP SERPL CALCULATED.3IONS-SCNC: 12 MMOL/L (ref 4–16)
AST SERPL-CCNC: 14 IU/L (ref 15–37)
BACTERIA: ABNORMAL /HPF
BARBITURATE SCREEN URINE: NEGATIVE
BASOPHILS ABSOLUTE: 0 K/CU MM
BASOPHILS RELATIVE PERCENT: 0.3 % (ref 0–1)
BENZODIAZEPINE SCREEN, URINE: NEGATIVE
BILIRUB SERPL-MCNC: 0.4 MG/DL (ref 0–1)
BILIRUBIN URINE: NEGATIVE MG/DL
BLOOD, URINE: ABNORMAL
BUN BLDV-MCNC: 10 MG/DL (ref 6–23)
CALCIUM SERPL-MCNC: 9.1 MG/DL (ref 8.3–10.6)
CANNABINOID SCREEN URINE: ABNORMAL
CHLORIDE BLD-SCNC: 101 MMOL/L (ref 99–110)
CLARITY: ABNORMAL
CO2: 25 MMOL/L (ref 21–32)
COCAINE METABOLITE: NEGATIVE
COLOR: YELLOW
CREAT SERPL-MCNC: 0.7 MG/DL (ref 0.6–1.1)
DIFFERENTIAL TYPE: ABNORMAL
DOSE AMOUNT: ABNORMAL
DOSE AMOUNT: ABNORMAL
DOSE TIME: ABNORMAL
DOSE TIME: ABNORMAL
EOSINOPHILS ABSOLUTE: 0.1 K/CU MM
EOSINOPHILS RELATIVE PERCENT: 0.6 % (ref 0–3)
GFR SERPL CREATININE-BSD FRML MDRD: >60 ML/MIN/1.73M2
GLUCOSE BLD-MCNC: 92 MG/DL (ref 70–99)
GLUCOSE, URINE: NEGATIVE MG/DL
GONADOTROPIN, CHORIONIC (HCG) QUANT: <1 UIU/ML
HCT VFR BLD CALC: 40.3 % (ref 37–47)
HEMOGLOBIN: 13.6 GM/DL (ref 12.5–16)
IMMATURE NEUTROPHIL %: 0.5 % (ref 0–0.43)
KETONES, URINE: ABNORMAL MG/DL
LEUKOCYTE ESTERASE, URINE: ABNORMAL
LYMPHOCYTES ABSOLUTE: 1.8 K/CU MM
LYMPHOCYTES RELATIVE PERCENT: 18 % (ref 24–44)
MCH RBC QN AUTO: 30 PG (ref 27–31)
MCHC RBC AUTO-ENTMCNC: 33.7 % (ref 32–36)
MCV RBC AUTO: 88.8 FL (ref 78–100)
MONOCYTES ABSOLUTE: 0.7 K/CU MM
MONOCYTES RELATIVE PERCENT: 7.1 % (ref 0–4)
MUCUS: ABNORMAL HPF
NITRITE URINE, QUANTITATIVE: POSITIVE
NUCLEATED RBC %: 0 %
OPIATES, URINE: NEGATIVE
OXYCODONE: NEGATIVE
PDW BLD-RTO: 12.8 % (ref 11.7–14.9)
PH, URINE: 6 (ref 5–8)
PHENCYCLIDINE, URINE: NEGATIVE
PLATELET # BLD: 253 K/CU MM (ref 140–440)
PMV BLD AUTO: 10.2 FL (ref 7.5–11.1)
POTASSIUM SERPL-SCNC: 3.4 MMOL/L (ref 3.5–5.1)
PROTEIN UA: NEGATIVE MG/DL
RBC # BLD: 4.54 M/CU MM (ref 4.2–5.4)
RBC URINE: 3 /HPF (ref 0–6)
SALICYLATE LEVEL: <0.3 MG/DL (ref 15–30)
SARS-COV-2, NAAT: NOT DETECTED
SEGMENTED NEUTROPHILS ABSOLUTE COUNT: 7.2 K/CU MM
SEGMENTED NEUTROPHILS RELATIVE PERCENT: 73.5 % (ref 36–66)
SODIUM BLD-SCNC: 138 MMOL/L (ref 135–145)
SOURCE: NORMAL
SPECIFIC GRAVITY UA: 1.02 (ref 1–1.03)
SQUAMOUS EPITHELIAL: 1 /HPF
TOTAL IMMATURE NEUTOROPHIL: 0.05 K/CU MM
TOTAL NUCLEATED RBC: 0 K/CU MM
TOTAL PROTEIN: 7.7 GM/DL (ref 6.4–8.2)
TRICHOMONAS: ABNORMAL /HPF
TSH HIGH SENSITIVITY: 0.89 UIU/ML (ref 0.27–4.2)
UROBILINOGEN, URINE: 0.2 MG/DL (ref 0.2–1)
WBC # BLD: 9.8 K/CU MM (ref 4–10.5)
WBC UA: 23 /HPF (ref 0–5)

## 2022-12-28 PROCEDURE — 84443 ASSAY THYROID STIM HORMONE: CPT

## 2022-12-28 PROCEDURE — 87635 SARS-COV-2 COVID-19 AMP PRB: CPT

## 2022-12-28 PROCEDURE — G0480 DRUG TEST DEF 1-7 CLASSES: HCPCS

## 2022-12-28 PROCEDURE — 84702 CHORIONIC GONADOTROPIN TEST: CPT

## 2022-12-28 PROCEDURE — 99285 EMERGENCY DEPT VISIT HI MDM: CPT

## 2022-12-28 PROCEDURE — 81001 URINALYSIS AUTO W/SCOPE: CPT

## 2022-12-28 PROCEDURE — 80053 COMPREHEN METABOLIC PANEL: CPT

## 2022-12-28 PROCEDURE — 87077 CULTURE AEROBIC IDENTIFY: CPT

## 2022-12-28 PROCEDURE — 87086 URINE CULTURE/COLONY COUNT: CPT

## 2022-12-28 PROCEDURE — 6370000000 HC RX 637 (ALT 250 FOR IP): Performed by: EMERGENCY MEDICINE

## 2022-12-28 PROCEDURE — 80307 DRUG TEST PRSMV CHEM ANLYZR: CPT

## 2022-12-28 PROCEDURE — 85025 COMPLETE CBC W/AUTO DIFF WBC: CPT

## 2022-12-28 PROCEDURE — 87186 SC STD MICRODIL/AGAR DIL: CPT

## 2022-12-28 RX ORDER — CEPHALEXIN 250 MG/1
500 CAPSULE ORAL ONCE
Status: COMPLETED | OUTPATIENT
Start: 2022-12-28 | End: 2022-12-28

## 2022-12-28 RX ORDER — CEPHALEXIN 500 MG/1
500 CAPSULE ORAL 2 TIMES DAILY
Qty: 14 CAPSULE | Refills: 0 | Status: SHIPPED | OUTPATIENT
Start: 2022-12-28 | End: 2023-01-04

## 2022-12-28 RX ADMIN — CEPHALEXIN 500 MG: 250 CAPSULE ORAL at 15:02

## 2022-12-28 ASSESSMENT — PAIN - FUNCTIONAL ASSESSMENT: PAIN_FUNCTIONAL_ASSESSMENT: NONE - DENIES PAIN

## 2022-12-28 NOTE — ED PROVIDER NOTES
[facility]      TRIAGE CHIEF COMPLAINT:   Mental Health Problem (Patient arrives with EMS accompanied by [facility] officers. Patient called 911 for Mental Health needs. Patient denies being SI or HI and refuses to answer other questions.)      LOUISE:  Abraham is a 33 y.o. female that presents with complaint depression, mental health problem. Patient states he has a history of mental health issues she is requesting go to mental health hospital.  Patient is refusing to talk to me much more. She does not elaborate on her depression she does states she is now suicidal she does again take medication she has a history of this. She denies other questions or concerns but again otherwise refusing to talk to me. No further HPI available. Trisha Mott REVIEW OF SYSTEMS:  At least 10 systems reviewed and otherwise acutely negative except as in the [address] Review of Systems   Unable to perform ROS: Psychiatric disorder   Psychiatric/Behavioral:  Positive for dysphoric mood. Negative for sleep disturbance. No past medical history on file. No past surgical history on file. No family history on file.   Social History     Socioeconomic History    Marital status: Single     Spouse name: Not on file    Number of children: Not on file    Years of education: Not on file    Highest education level: Not on file   Occupational History    Not on file   Tobacco Use    Smoking status: Every Day     Packs/day: 0.50     Types: Cigarettes    Smokeless tobacco: Never   Vaping Use    Vaping Use: Never used   Substance and Sexual Activity    Alcohol use: Yes    Drug use: Yes     Types: Marijuana Maida Coad)     Comment: daily    Sexual activity: Not on file   Other Topics Concern    Not on file   Social History Narrative    Not on file     Social Determinants of Health     Financial Resource Strain: Not on file   Food Insecurity: Not on file   Transportation Needs: Not on file   Physical Activity: Not on file   Stress: Not on file   Social Connections: Not on file   Intimate Partner Violence: Not on file   Housing Stability: Not on file     Current Facility-Administered Medications   Medication Dose Route Frequency Provider Last Rate Last Admin    cephALEXin (KEFLEX) capsule 500 mg  500 mg Oral Once Mirian Pears, DO         Current Outpatient Medications   Medication Sig Dispense Refill    dicyclomine (BENTYL) 20 MG tablet Take 1 tablet by mouth See Admin Instructions One tablet by mouth every 6-8 hours when necessary for abdominal pain or cramping. 20 tablet 0      No Known Allergies  Current Facility-Administered Medications   Medication Dose Route Frequency Provider Last Rate Last Admin    cephALEXin (KEFLEX) capsule 500 mg  500 mg Oral Once Patagonia Pears, DO         Current Outpatient Medications   Medication Sig Dispense Refill    dicyclomine (BENTYL) 20 MG tablet Take 1 tablet by mouth See Admin Instructions One tablet by mouth every 6-8 hours when necessary for abdominal pain or cramping. 20 tablet 0       Nursing Notes Reviewed    VITAL SIGNS:  ED Triage Vitals   Enc Vitals Group      BP       Pulse       Resp       Temp       Temp src       SpO2       Weight       Height       Head Circumference       Peak Flow       Pain Score       Pain Loc       Pain Edu? Excl. in [address]? PHYSICAL EXAM:  Physical Exam  Vitals and nursing note reviewed. Constitutional:       General: She is sleeping. She is not in acute distress. Appearance: Normal appearance. She is well-developed and well-groomed. She is not ill-appearing, toxic-appearing or diaphoretic. HENT:      Head: Normocephalic and atraumatic. Right Ear: External ear normal.      Left Ear: External ear normal.   Eyes:      General: No scleral icterus. Right eye: No discharge. Left eye: No discharge. Extraocular Movements: Extraocular movements intact.       Conjunctiva/sclera: Conjunctivae normal.   Cardiovascular:      Rate and Rhythm: Normal rate and regular rhythm. Pulses: Normal pulses. Heart sounds: Normal heart sounds. Pulmonary:      Effort: Pulmonary effort is normal. No respiratory distress. Breath sounds: Normal breath sounds. No stridor. No wheezing, rhonchi or rales. Abdominal:      General: Bowel sounds are normal. There is no distension. Palpations: Abdomen is soft. There is no mass. Tenderness: There is no abdominal tenderness. There is no guarding or rebound. Hernia: No hernia is present. Musculoskeletal:         General: No swelling, tenderness, deformity or signs of injury. Normal range of motion. Cervical back: Normal range of motion. No rigidity. Right lower leg: No edema. Left lower leg: No edema. Skin:     General: Skin is warm. Coloration: Skin is not jaundiced or pale. Findings: No bruising, erythema, lesion or rash. Neurological:      General: No focal deficit present. Mental Status: She is oriented to person, place, and time and easily aroused. Cranial Nerves: No cranial nerve deficit. Sensory: No sensory deficit. Motor: No weakness. Coordination: Coordination normal.   Psychiatric:         Attention and Perception: Attention and perception normal.         Mood and Affect: Mood is depressed. Affect is blunt and flat. Speech: Speech normal.         Behavior: Behavior is uncooperative and withdrawn. Thought Content: Thought content does not include suicidal ideation. Thought content does not include suicidal plan. Cognition and Memory: Cognition and memory normal.         Judgment: Judgment is impulsive.          I have reviewed andinterpreted all of the currently available lab results from this visit (if applicable):    Results for orders placed or performed during the hospital encounter of 12/28/22   COVID-19, Rapid    Specimen: Nasopharyngeal   Result Value Ref Range    Source UNKNOWN     SARS-CoV-2, NAAT NOT DETECTED NOT DETECTED   CBC with Auto Differential   Result Value Ref Range    WBC 9.8 4.0 - 10.5 K/CU MM    RBC 4.54 4.2 - 5.4 M/CU MM    Hemoglobin 13.6 12.5 - 16.0 GM/DL    Hematocrit 40.3 37 - 47 %    MCV 88.8 78 - 100 FL    MCH 30.0 27 - 31 PG    MCHC 33.7 32.0 - 36.0 %    RDW 12.8 11.7 - 14.9 %    Platelets 514 311 - 704 K/CU MM    MPV 10.2 7.5 - 11.1 FL    Differential Type AUTOMATED DIFFERENTIAL     Segs Relative 73.5 (H) 36 - 66 %    Lymphocytes % 18.0 (L) 24 - 44 %    Monocytes % 7.1 (H) 0 - 4 %    Eosinophils % 0.6 0 - 3 %    Basophils % 0.3 0 - 1 %    Segs Absolute 7.2 K/CU MM    Lymphocytes Absolute 1.8 K/CU MM    Monocytes Absolute 0.7 K/CU MM    Eosinophils Absolute 0.1 K/CU MM    Basophils Absolute 0.0 K/CU MM    Nucleated RBC % 0.0 %    Total Nucleated RBC 0.0 K/CU MM    Total Immature Neutrophil 0.05 K/CU MM    Immature Neutrophil % 0.5 (H) 0 - 0.43 %   Comprehensive Metabolic Panel   Result Value Ref Range    Sodium 138 135 - 145 MMOL/L    Potassium 3.4 (L) 3.5 - 5.1 MMOL/L    Chloride 101 99 - 110 mMol/L    CO2 25 21 - 32 MMOL/L    BUN 10 6 - 23 MG/DL    Creatinine 0.7 0.6 - 1.1 MG/DL    Est, Glom Filt Rate >60 >60 mL/min/1.73m2    Glucose 92 70 - 99 MG/DL    Calcium 9.1 8.3 - 10.6 MG/DL    Albumin 4.6 3.4 - 5.0 GM/DL    Total Protein 7.7 6.4 - 8.2 GM/DL    Total Bilirubin 0.4 0.0 - 1.0 MG/DL    ALT 10 10 - 40 U/L    AST 14 (L) 15 - 37 IU/L    Alkaline Phosphatase 48 40 - 129 IU/L    Anion Gap 12 4 - 16   TSH   Result Value Ref Range    TSH, High Sensitivity 0.892 0.270 - 5.31 uIu/ml   Salicylate   Result Value Ref Range    Salicylate Lvl <1.2 (L) 15 - 30 MG/DL    DOSE AMOUNT DOSE AMT. GIVEN - UNKNOWN     DOSE TIME DOSE TIME GIVEN - UNKNOWN    Acetaminophen Level   Result Value Ref Range    Acetaminophen Level <5.0 (L) 15 - 30 ug/ml    DOSE AMOUNT DOSE AMT.  GIVEN - UNKNOWN     DOSE TIME DOSE TIME GIVEN - UNKNOWN    HCG Serum, Quantitative   Result Value Ref Range    hCG Quant <1.0 uIU/ML Urinalysis   Result Value Ref Range    Color, UA YELLOW YELLOW    Clarity, UA SLIGHTLY CLOUDY (A) CLEAR    Glucose, Urine NEGATIVE NEGATIVE MG/DL    Bilirubin Urine NEGATIVE NEGATIVE MG/DL    Ketones, Urine TRACE (A) NEGATIVE MG/DL    Specific Gravity, UA 1.025 1.001 - 1.035    Blood, Urine TRACE (A) NEGATIVE    pH, Urine 6.0 5.0 - 8.0    Protein, UA NEGATIVE NEGATIVE MG/DL    Urobilinogen, Urine 0.2 0.2 - 1.0 MG/DL    Nitrite Urine, Quantitative POSITIVE (A) NEGATIVE    Leukocyte Esterase, Urine TRACE (A) NEGATIVE   Urine Drug Screen   Result Value Ref Range    Cannabinoid Scrn, Ur UNCONFIRMED POSITIVE (A) NEGATIVE    Amphetamines UNCONFIRMED POSITIVE (A) NEGATIVE    Cocaine Metabolite NEGATIVE NEGATIVE    Benzodiazepine Screen, Urine NEGATIVE NEGATIVE    Barbiturate Screen, Ur NEGATIVE NEGATIVE    Opiates, Urine NEGATIVE NEGATIVE    Phencyclidine, Urine NEGATIVE NEGATIVE    Oxycodone NEGATIVE NEGATIVE   Ethanol   Result Value Ref Range    Alcohol Scrn <0.01 <0.01 %WT/VOL   Microscopic Urinalysis   Result Value Ref Range    RBC, UA 3 0 - 6 /HPF    WBC, UA 23 (H) 0 - 5 /HPF    Bacteria, UA OCCASIONAL (A) NEGATIVE /HPF    Squam Epithel, UA 1 /HPF    Mucus, UA MANY (A) NEGATIVE HPF    Trichomonas, UA NONE SEEN NONE SEEN /HPF        Radiographs (if obtained):  [] The following radiograph was interpreted by myself in the absence of a radiologist:  [x] Radiologist's Report Reviewed:      EKG (if obtained): (All EKG's are interpreted by myself in the absence of a cardiologist)    MDM:    Patient with depression, mental health problem. Again she is requesting to come, correction to go to a psychiatric hospital.  She has been before history without disorder history of depression. She would not elaborate on her depression why she is depressed she called for help and brought in by EMS and police.   She again will not elaborate she is not cooperating she is just laying in bed sleeping she will talk some but does not elaborate. She is in no distress vital signs are stable no signs of trauma. Will perform mental, psych work-up. Patient does have UTI given Keflex will culture urine she does have a positive urine drug screen. I did not pink slip her because she is not suicidal or homicidal but is depressed she does again not cooperate with history or exam.  Patient pending mental health evaluation and medical clearance. Will sign patient out to Dr. Verito Giordano. CLINICAL IMPRESSION:  Final diagnoses:   Mental health problem   Depression, unspecified depression type   Positive urine drug screen   Urinary tract infection without hematuria, site unspecified       (Please note that portions of this note may have been completed with a voice recognition program. Efforts were made to edit the dictations but occasionally words aremis-transcribed.)    DISPOSITION REFERRAL (if applicable):  No follow-up provider specified.     DISPOSITION MEDICATIONS (if applicable):  New Prescriptions    No medications on file          Jules Rosas, [facility], DO  12/28/22 5106

## 2022-12-28 NOTE — ED TRIAGE NOTES
Patient arrives to this facility via EMS and SPD. Patient reports she has a history of MH problems. Patient denies having SI or HI but does not answer other questions. Resting on cot in a supine position with eyes closed. [address] worker at the bedside.

## 2022-12-28 NOTE — ED NOTES
\" I don't know what is wrong with my brain. \"  \" I need to fix my face but I know I can't right now. \"  \" What does this feel like? \" -starts lightly smacking her face. \"Ok well. \"       Jessica Alert, [address] [place]  12/28/22 6459

## 2022-12-28 NOTE — ED NOTES
Chief Complaint:      Wants to go to [facility]     Provisional Diagnosis:   Hx psychosis per records   Hx bipolar per records  Polysubstance use per UDS     Risk, Psychosocial and Contextual Factors: (homeless, lack of social support etc.):       Current MH Treatment:     Hx inpatient per record    Present Suicidal Behavior:    Verbal:  Denies   Attempt:  Denies     Access to Weapons:  Unknown     C-SSRS Current Suicide Risk: Low, Moderate or High:      No risk     Past Suicidal Behavior:    Verbal:  Hx SI per record  Attempts:  Denies    Self-Injurious/Self-Mutilation: (Specify)  Denies     Traumatic Event Within Past 2 Weeks: (Specify)   Unknown     Current Abuse:  (Specify)  Unknown     Legal: (Specify)  Unknown    Violence: (Specify)  Hx aggression per record    Protective Factors:    Unknown    Housing:  Unknown     Risk Factors:   Hx psychosis per records   Hx bipolar per records  Appears impulsive     Clinical Summary:    Pt presents to ED with EMS and SPD. Stated to EMS and SPD that she want to go to mental health and they brought her here. Pt frustrated that she's here, states she doesn't want to open up to us, tell us about her issues, and get emotional just for her to repeat everything again when she is transferred. No eye contact, keeping eyes closed, verbalized that she doesn't want to look at any of us. Pt only providing limited responses, mostly yes/no. Denies VAZQUEZ  Denies AVH  AO4  UDS positive for amphetamines and marijuana   Refusing to answer questions re sleep and appetite     Questionable insight and judgement. Level of Care Disposition:      Consulted with medical provider. Patient is medically stabilized. Consulted with patients RN about abnormalities or medical concerns. No abnormalities or medical concerns noted. Consulted with Dr Domenic Severin. Patient to be discharged with mom and is to follow up with outpatient mental health. Pt encouraged to walk next door to [address] [place] [address] Dr. Bajwa Divers Maikol Parker, MSW  12/28/22 0187

## 2022-12-28 NOTE — ED PROVIDER NOTES
Patient endorsed to me by Dr. Pb Cardona. See his note for details    55-year-old female presented to the ED with a history of depressed mood. Patient had called EMS to bring her to the hospital.  Patient states she wants to go to mental health across the street. Patient's not cooperating with history, simply states she wants to be at mental health center across the street. Patient denies suicidal or homicidal ideations. Not pink slipped. Physical examination is unremarkable. Patient laboratory evaluation significant for positive UDS with cannabinoids and amphetamine, and also UTI for which patient has been given some antibiotics, p.o. Keflex. Patient is otherwise cleared for evaluation by behavioral health.      5:00 PM  Patient states she feels better at this time. Patient states she was feeling sad in the morning. As she was homeless. She had nowhere to go. Patient states she feels better at this time. Patient's mother also in the ED at this time and is ready to take the patient home. Patient discharged home with mother. Patient also given antibiotics for UTI. Patient given return instructions to the ED in the event of suicidal or homicidal ideations or episodes of hallucinations. Patient given opportunity ask questions and all questions are answered appropriate details. Mother who is by the bedside as well as patient both verbalized their understanding and agreement with the plan.     .      EKG (if obtained): (All EKG's are interpreted by myself in the absence of a cardiologist)      Labs (if applicable):  Results for orders placed or performed during the hospital encounter of 12/28/22   COVID-19, Rapid    Specimen: Nasopharyngeal   Result Value Ref Range    Source UNKNOWN     SARS-CoV-2, NAAT NOT DETECTED NOT DETECTED   Culture, Urine    Specimen: Urine, clean catch   Result Value Ref Range    Specimen URINE CLEAN CATCH     Special Requests NONE     Culture Final Report Culture ESCHERICHIA COLI >100,000 CFU/ml (A)        Susceptibility    Escherichia coli - BACTERIAL SUSCEPTIBILITY PANEL RADHA     ampicillin >=32 Resistant      ampicillin-sulbactam 16 Intermediate      ceFAZolin <=4 Sensitive      ceFAZolin Value in next row        (NOTE)NOTE: Cefazolin should only be used for uncomplicated UTI     for E.coli or Klebsiella pneumoniae. cefepime Value in next row Sensitive       (NOTE)NOTE: Cefazolin should only be used for uncomplicated UTI     for E.coli or Klebsiella pneumoniae. ciprofloxacin Value in next row Sensitive       (NOTE)NOTE: Cefazolin should only be used for uncomplicated UTI     for E.coli or Klebsiella pneumoniae.     ertapenem Value in next row Sensitive       (NOTE)NOTE: Cefazolin should only be used for uncomplicated UTI     for E.coli or Klebsiella pneumoniae. gentamicin Value in next row Sensitive       (NOTE)NOTE: Cefazolin should only be used for uncomplicated UTI     for E.coli or Klebsiella pneumoniae. levofloxacin Value in next row Sensitive       (NOTE)NOTE: Cefazolin should only be used for uncomplicated UTI     for E.coli or Klebsiella pneumoniae. piperacillin-tazobactam Value in next row Sensitive       (NOTE)NOTE: Cefazolin should only be used for uncomplicated UTI     for E.coli or Klebsiella pneumoniae. trimethoprim-sulfamethoxazole Value in next row Resistant       (NOTE)NOTE: Cefazolin should only be used for uncomplicated UTI     for E.coli or Klebsiella pneumoniae. nitrofurantoin Value in next row Sensitive       (NOTE)NOTE: Cefazolin should only be used for uncomplicated UTI     for E.coli or Klebsiella pneumoniae.    CBC with Auto Differential   Result Value Ref Range    WBC 9.8 4.0 - 10.5 K/CU MM    RBC 4.54 4.2 - 5.4 M/CU MM    Hemoglobin 13.6 12.5 - 16.0 GM/DL    Hematocrit 40.3 37 - 47 %    MCV 88.8 78 - 100 FL    MCH 30.0 27 - 31 PG    MCHC 33.7 32.0 - 36.0 %    RDW 12.8 11.7 - 14.9 %    Platelets 487 841 - 223 K/CU MM    MPV 10.2 7.5 - 11.1 FL    Differential Type AUTOMATED DIFFERENTIAL     Segs Relative 73.5 (H) 36 - 66 %    Lymphocytes % 18.0 (L) 24 - 44 %    Monocytes % 7.1 (H) 0 - 4 %    Eosinophils % 0.6 0 - 3 %    Basophils % 0.3 0 - 1 %    Segs Absolute 7.2 K/CU MM    Lymphocytes Absolute 1.8 K/CU MM    Monocytes Absolute 0.7 K/CU MM    Eosinophils Absolute 0.1 K/CU MM    Basophils Absolute 0.0 K/CU MM    Nucleated RBC % 0.0 %    Total Nucleated RBC 0.0 K/CU MM    Total Immature Neutrophil 0.05 K/CU MM    Immature Neutrophil % 0.5 (H) 0 - 0.43 %   Comprehensive Metabolic Panel   Result Value Ref Range    Sodium 138 135 - 145 MMOL/L    Potassium 3.4 (L) 3.5 - 5.1 MMOL/L    Chloride 101 99 - 110 mMol/L    CO2 25 21 - 32 MMOL/L    BUN 10 6 - 23 MG/DL    Creatinine 0.7 0.6 - 1.1 MG/DL    Est, Glom Filt Rate >60 >60 mL/min/1.73m2    Glucose 92 70 - 99 MG/DL    Calcium 9.1 8.3 - 10.6 MG/DL    Albumin 4.6 3.4 - 5.0 GM/DL    Total Protein 7.7 6.4 - 8.2 GM/DL    Total Bilirubin 0.4 0.0 - 1.0 MG/DL    ALT 10 10 - 40 U/L    AST 14 (L) 15 - 37 IU/L    Alkaline Phosphatase 48 40 - 129 IU/L    Anion Gap 12 4 - 16   TSH   Result Value Ref Range    TSH, High Sensitivity 0.892 0.270 - 3.22 uIu/ml   Salicylate   Result Value Ref Range    Salicylate Lvl <2.4 (L) 15 - 30 MG/DL    DOSE AMOUNT DOSE AMT. GIVEN - UNKNOWN     DOSE TIME DOSE TIME GIVEN - UNKNOWN    Acetaminophen Level   Result Value Ref Range    Acetaminophen Level <5.0 (L) 15 - 30 ug/ml    DOSE AMOUNT DOSE AMT.  GIVEN - UNKNOWN     DOSE TIME DOSE TIME GIVEN - UNKNOWN    HCG Serum, Quantitative   Result Value Ref Range    hCG Quant <1.0 uIU/ML   Urinalysis   Result Value Ref Range    Color, UA YELLOW YELLOW    Clarity, UA SLIGHTLY CLOUDY (A) CLEAR    Glucose, Urine NEGATIVE NEGATIVE MG/DL    Bilirubin Urine NEGATIVE NEGATIVE MG/DL    Ketones, Urine TRACE (A) NEGATIVE MG/DL    Specific Gravity, UA 1.025 1.001 - 1.035    Blood, Urine TRACE (A) NEGATIVE    pH, Urine 6.0 5.0 - 8.0    Protein, UA NEGATIVE NEGATIVE MG/DL    Urobilinogen, Urine 0.2 0.2 - 1.0 MG/DL    Nitrite Urine, Quantitative POSITIVE (A) NEGATIVE    Leukocyte Esterase, Urine TRACE (A) NEGATIVE   Urine Drug Screen   Result Value Ref Range    Cannabinoid Scrn, Ur UNCONFIRMED POSITIVE (A) NEGATIVE    Amphetamines UNCONFIRMED POSITIVE (A) NEGATIVE    Cocaine Metabolite NEGATIVE NEGATIVE    Benzodiazepine Screen, Urine NEGATIVE NEGATIVE    Barbiturate Screen, Ur NEGATIVE NEGATIVE    Opiates, Urine NEGATIVE NEGATIVE    Phencyclidine, Urine NEGATIVE NEGATIVE    Oxycodone NEGATIVE NEGATIVE   Ethanol   Result Value Ref Range    Alcohol Scrn <0.01 <0.01 %WT/VOL   Microscopic Urinalysis   Result Value Ref Range    RBC, UA 3 0 - 6 /HPF    WBC, UA 23 (H) 0 - 5 /HPF    Bacteria, UA OCCASIONAL (A) NEGATIVE /HPF    Squam Epithel, UA 1 /HPF    Mucus, UA MANY (A) NEGATIVE HPF    Trichomonas, UA NONE SEEN NONE SEEN /HPF          Clinical Impression:  1. Mental health problem    2. Depression, unspecified depression type    3. Positive urine drug screen    4. Urinary tract infection without hematuria, site unspecified      Disposition referral (if applicable):  Iva James DO  [address] [facility] [address] [facility]  [phone]    Schedule an appointment as soon as possible for a visit     Disposition medications (if applicable):  Discharge Medication List as of 12/28/2022  5:17 PM        START taking these medications    Details   cephALEXin (KEFLEX) 500 MG capsule Take 1 capsule by mouth 2 times daily for 7 days, Disp-14 capsule, R-0Print           ED Provider Disposition Time  DISPOSITION Decision To Discharge 12/28/2022 05:13:28 PM      Comment: Please note this report has been produced using speech recognition software and may contain errors related to that system including errors in grammar, punctuation, and spelling, as well as words and phrases that may be inappropriate. Efforts were made to edit the dictations. [address] [facility] Floor, DO  12/30/22 2032

## 2022-12-28 NOTE — ED NOTES
D/c instructions reviewed with pt. All questions answered. Pt states her mother is her support system and that she already feels much better talking to her. Pt verbally agreed to d/c instructions and is d/c at this time with her mother.       Oliva Shoemaker RN  12/28/22 1318

## 2022-12-28 NOTE — ED NOTES
Attempting to triage pt. Pt is not willing to answer y questions. Pt is laying supine with her legs dangling and the end of the bed.  Pt did say no to being SI or HI and is allowing me to take her vs.      Delois Primrose, RN  12/28/22 0599 25-Feb-2022 18:34

## 2022-12-28 NOTE — DISCHARGE INSTRUCTIONS
Follow-up with primary care and behavioral health as soon as possible  Take antibiotics as prescribed  Return to the ED if you have any suicidal, or homicidal ideations, or any hallucinations, or any symptom of concern.

## 2022-12-28 NOTE — ED NOTES
Pt stating that her mother is here, she no longer needs to be here, she is fine and is going to leave with her mother and needs her clothing. Nurse called to come talk with pt and parents.       Jolee Klinefelter, MA  12/28/22 [address] [address]  12/28/22 6187

## 2022-12-28 NOTE — SUICIDE SAFETY PLAN
SAFETY PLAN    Pt encouraged to follow up with outpatient mental health. Pt encouraged to remove any weapons, sharp objects, and unused medications from the home. Pt encouraged to refrain from using drugs or alcohol. Pt provided with information and resources. Pt encouraged to return to the Emergency Department if symptoms worsen or if they begin to feel suicidal or homicidal. Pt is adamant that they are not suicidal or homicidal. Pt verbalized understanding and agreement. Pt guarantees their safety upon discharge.

## 2022-12-28 NOTE — ED NOTES
Pt. States \"I keep hearing random words in my head and its really confusing to understand. \"     Alicja Carrier  12/28/22 1527

## 2022-12-28 NOTE — ED NOTES
Asked pt if she wanted me to take her food tray and place it beside her due to her falling asleep.  -pt would not speak to me and just smiled.     -pt then started to eat her food. -started talking. I asked her to repeat herself and she stated \" I'm talking to my daddy. \"  - \"I got broken teeth. \"  - \"they tryin' to take my food away. \"     Prem Arguello, [place]  12/28/22 0543

## 2022-12-28 NOTE — ED NOTES
Pt apologized for being rude to this tech/sitter.       Gaetano Drummond, [facility]  12/28/22 1911

## 2022-12-28 NOTE — ED NOTES
At random pt will start talking, but not to the sitter/tech. \" I have to work my ass off for tips\"  \" I'm in trouble anyway so I'm going to do whatever\"  \" She doesn't even listen to me she is thinking and typing. \"  \" I mean it went Early! \"  \" I'll be quite so you can gather yourself, I will be quite. \"     Pt currently smacking her face lightly. Advised her not to do that. Pt did not reply but stopped.             Barbara [place], [place]  [birth year] 6846

## 2022-12-30 LAB
CULTURE: ABNORMAL
CULTURE: ABNORMAL
Lab: ABNORMAL
SPECIMEN: ABNORMAL

## 2022-12-30 NOTE — PROGRESS NOTES
Pharmacy Note  ED Culture Follow-up    Shilpa Stewart is a 33 y.o. female. Allergies: Patient has no known allergies. Current antimicrobials:   Reviewed patient's urine culture - culture positive for E. Coli >100,000 CFU/ml. Patient was discharged on cephalexin 500 mg by mouth twice daily for 7 days, and culture is sensitive to prescribed medication. Antibiotic prescribed at discharge is appropriate - no changes made to antibiotic regimen. Please call with any questions.  MARGARITA Durant GIFT[facility], PharmD 10:11 AM 12/30/2022

## 2023-01-07 ENCOUNTER — HOSPITAL ENCOUNTER (EMERGENCY)
Age: 28
Discharge: HOME | End: 2023-01-09
Attending: EMERGENCY MEDICINE
Payer: MEDICAID

## 2023-01-07 DIAGNOSIS — U07.1 COVID-19: ICD-10-CM

## 2023-01-07 DIAGNOSIS — R45.851 SUICIDAL IDEATION: Primary | ICD-10-CM

## 2023-01-07 DIAGNOSIS — R44.3 HALLUCINATIONS: ICD-10-CM

## 2023-01-07 LAB
ACETAMINOPHEN LEVEL: <5 UG/ML (ref 15–30)
ALBUMIN SERPL-MCNC: 4.4 GM/DL (ref 3.4–5)
ALCOHOL SCREEN SERUM: <0.01 %WT/VOL
ALP BLD-CCNC: 48 IU/L (ref 40–129)
ALT SERPL-CCNC: 8 U/L (ref 10–40)
AMPHETAMINES: NEGATIVE
ANION GAP SERPL CALCULATED.3IONS-SCNC: 8 MMOL/L (ref 4–16)
AST SERPL-CCNC: 12 IU/L (ref 15–37)
BARBITURATE SCREEN URINE: NEGATIVE
BASOPHILS ABSOLUTE: 0 K/CU MM
BASOPHILS RELATIVE PERCENT: 0.6 % (ref 0–1)
BENZODIAZEPINE SCREEN, URINE: NEGATIVE
BILIRUB SERPL-MCNC: 0.1 MG/DL (ref 0–1)
BUN BLDV-MCNC: 8 MG/DL (ref 6–23)
CALCIUM SERPL-MCNC: 9 MG/DL (ref 8.3–10.6)
CANNABINOID SCREEN URINE: ABNORMAL
CHLORIDE BLD-SCNC: 103 MMOL/L (ref 99–110)
CO2: 28 MMOL/L (ref 21–32)
COCAINE METABOLITE: NEGATIVE
CREAT SERPL-MCNC: 0.6 MG/DL (ref 0.6–1.1)
DIFFERENTIAL TYPE: ABNORMAL
DOSE AMOUNT: ABNORMAL
DOSE AMOUNT: ABNORMAL
DOSE TIME: ABNORMAL
DOSE TIME: ABNORMAL
EOSINOPHILS ABSOLUTE: 0.1 K/CU MM
EOSINOPHILS RELATIVE PERCENT: 1.9 % (ref 0–3)
GFR SERPL CREATININE-BSD FRML MDRD: >60 ML/MIN/1.73M2
GLUCOSE BLD-MCNC: 75 MG/DL (ref 70–99)
HCT VFR BLD CALC: 42.3 % (ref 37–47)
HEMOGLOBIN: 13.7 GM/DL (ref 12.5–16)
IMMATURE NEUTROPHIL %: 0.4 % (ref 0–0.43)
INTERPRETATION: NORMAL
LYMPHOCYTES ABSOLUTE: 2.2 K/CU MM
LYMPHOCYTES RELATIVE PERCENT: 31.9 % (ref 24–44)
MCH RBC QN AUTO: 29.4 PG (ref 27–31)
MCHC RBC AUTO-ENTMCNC: 32.4 % (ref 32–36)
MCV RBC AUTO: 90.8 FL (ref 78–100)
MONOCYTES ABSOLUTE: 0.8 K/CU MM
MONOCYTES RELATIVE PERCENT: 12.1 % (ref 0–4)
NUCLEATED RBC %: 0 %
OPIATES, URINE: NEGATIVE
OXYCODONE: NEGATIVE
PDW BLD-RTO: 12.9 % (ref 11.7–14.9)
PHENCYCLIDINE, URINE: NEGATIVE
PLATELET # BLD: 222 K/CU MM (ref 140–440)
PMV BLD AUTO: 10.8 FL (ref 7.5–11.1)
POTASSIUM SERPL-SCNC: 4 MMOL/L (ref 3.5–5.1)
PREGNANCY, URINE: NEGATIVE
RAPID INFLUENZA  B AGN: NEGATIVE
RAPID INFLUENZA A AGN: NEGATIVE
RBC # BLD: 4.66 M/CU MM (ref 4.2–5.4)
SALICYLATE LEVEL: <0.3 MG/DL (ref 15–30)
SARS-COV-2, NAAT: DETECTED
SEGMENTED NEUTROPHILS ABSOLUTE COUNT: 3.7 K/CU MM
SEGMENTED NEUTROPHILS RELATIVE PERCENT: 53.1 % (ref 36–66)
SODIUM BLD-SCNC: 139 MMOL/L (ref 135–145)
SOURCE: ABNORMAL
SPECIFIC GRAVITY, URINE: <=1.005 (ref 1–1.03)
TOTAL IMMATURE NEUTOROPHIL: 0.03 K/CU MM
TOTAL NUCLEATED RBC: 0 K/CU MM
TOTAL PROTEIN: 7.6 GM/DL (ref 6.4–8.2)
WBC # BLD: 6.9 K/CU MM (ref 4–10.5)

## 2023-01-07 PROCEDURE — 6370000000 HC RX 637 (ALT 250 FOR IP): Performed by: EMERGENCY MEDICINE

## 2023-01-07 PROCEDURE — 80307 DRUG TEST PRSMV CHEM ANLYZR: CPT

## 2023-01-07 PROCEDURE — 85025 COMPLETE CBC W/AUTO DIFF WBC: CPT

## 2023-01-07 PROCEDURE — 99285 EMERGENCY DEPT VISIT HI MDM: CPT

## 2023-01-07 PROCEDURE — G0480 DRUG TEST DEF 1-7 CLASSES: HCPCS

## 2023-01-07 PROCEDURE — 80053 COMPREHEN METABOLIC PANEL: CPT

## 2023-01-07 PROCEDURE — 87635 SARS-COV-2 COVID-19 AMP PRB: CPT

## 2023-01-07 PROCEDURE — 81025 URINE PREGNANCY TEST: CPT

## 2023-01-07 PROCEDURE — 87804 INFLUENZA ASSAY W/OPTIC: CPT

## 2023-01-07 RX ORDER — HYDROXYZINE PAMOATE 25 MG/1
50 CAPSULE ORAL ONCE
Status: COMPLETED | OUTPATIENT
Start: 2023-01-07 | End: 2023-01-07

## 2023-01-07 RX ADMIN — HYDROXYZINE PAMOATE 50 MG: 25 CAPSULE ORAL at 16:10

## 2023-01-07 NOTE — ED PROVIDER NOTES
eMERGENCY dEPARTMENT eNCOUnter    ATTENDING SIGN OUT NOTE    HPI/Physical Exam/Medical Decision Making  Time: 16:30  I have received sign out of [facility]  Emergency Department care from Dr. Drea Chisholm. We discussed the history, physical exam, completed/pending test results (if obtained) and current treatment plan. Please refer to his/her chart for further details. In brief, the patient is a 33 y.o. female who presented to the ED requesting mental health help. The patient made statements to her sister today about not wanting to live. The patient denied being suicidal to Dr. Drea Chisholm.  She has recently been hallucinating and speaking to people who are not there. She has been given Vistaril to help calm down. Labs are reviewed and the patient's urine drug screen is positive for cannabinoids. She is positive for COVID. There are no other clinically significant lab abnormalities. The patient is medically cleared. The patient was evaluated by the psychiatrist on-call, Dr. Ac Fajardo, and admitted to having passive suicidal ideation to him. He recommends inpatient psychiatric admission. The patient is pink slipped for safety. She is in stable condition awaiting placement. 18:45  The patient has become agitated and is not wearing her mask even though she is COVID. She keeps coming out of her room. She is difficult to redirect. She was placed in seclusion briefly until she calm down. 1/8/23  12:30 am  I have signed out [facility]  Emergency Department care to Dr. Sonny Marin. We discussed the history, physical exam, completed/pending test results (if obtained) and current treatment plan. Please refer to his/her chart for further details, remaining Emergency Department course, final disposition and diagnosis. 1/8/23  23:30  I have received sign out of [facility]  Emergency Department care from Dr. Kris Dolan.  We discussed the history, physical exam, completed/pending test results (if obtained) and current treatment plan. Please refer to his/her chart for further details. Continues to await psychiatric placement. 1/9/23  The patient continues to await placement. I have signed out [facility]  Emergency Department care to Lola Mohr. We discussed the history, physical exam, completed/pending test results (if obtained) and current treatment plan. Please refer to his/her chart for further details, remaining Emergency Department course, final disposition and diagnosis. Diagnostics:  Labs Reviewed   COVID-19, RAPID - Abnormal; Notable for the following components:       Result Value    SARS-CoV-2, NAAT DETECTED (*)     All other components within normal limits   CBC WITH AUTO DIFFERENTIAL - Abnormal; Notable for the following components:    Monocytes % 12.1 (*)     All other components within normal limits   COMPREHENSIVE METABOLIC PANEL W/ REFLEX TO MG FOR LOW K - Abnormal; Notable for the following components:    ALT 8 (*)     AST 12 (*)     All other components within normal limits   URINE DRUG SCREEN - Abnormal; Notable for the following components:    Cannabinoid Scrn, Ur UNCONFIRMED POSITIVE (*)     All other components within normal limits   SALICYLATE LEVEL - Abnormal; Notable for the following components:    Salicylate Lvl <2.7 (*)     All other components within normal limits   ACETAMINOPHEN LEVEL - Abnormal; Notable for the following components:    Acetaminophen Level <5.0 (*)     All other components within normal limits   RAPID INFLUENZA A/B ANTIGENS   ETHANOL   PREGNANCY, URINE     Clinical Impression:  1. Suicidal ideation    2. Hallucinations    3. COVID-19          Comment: Please note this report has been produced using speech recognition software and may contain errors related to that system including errors in grammar, punctuation, and spelling, as well as words and phrases that may be inappropriate.  If there are any questions or concerns please feel free to contact the dictating provider for clarification.         Layla Jay MD  01/08/23 2551       Layla Jay MD  01/09/23 5575

## 2023-01-07 NOTE — ED PROVIDER NOTES
[facility] Department Encounter  Location: [address] [place] [address] EMERGENCY DEPARTMENT    Patient: Koko Dunne  MRN: [id number]  : 1995  Date of evaluation: 2023  ED Provider: Mariela Miranda DO    Chief Complaint:    No chief complaint on file. [place]:  Koko Dunne is a 33 y.o. female that presents to the emergency department with somewhat vague complaints of needing mental health. She states that she had a conversation with her sister this morning who then called for the squad. She admits that she made statements about not wanting to live but denies suicidal ideation. ROS:  Limited review of systems as patient is poorly cooperative, evasive in her answers. No past medical history on file. No past surgical history on file. No family history on file. Social History     Socioeconomic History    Marital status: Single     Spouse name: Not on file    Number of children: Not on file    Years of education: Not on file    Highest education level: Not on file   Occupational History    Not on file   Tobacco Use    Smoking status: Every Day     Packs/day: 0.50     Types: Cigarettes    Smokeless tobacco: Never   Vaping Use    Vaping Use: Never used   Substance and Sexual Activity    Alcohol use: Yes    Drug use: Yes     Types: Marijuana Vicky Homar)     Comment: daily    Sexual activity: Not on file   Other Topics Concern    Not on file   Social History Narrative    Not on file     Social Determinants of Health     Financial Resource Strain: Not on file   Food Insecurity: Not on file   Transportation Needs: Not on file   Physical Activity: Not on file   Stress: Not on file   Social Connections: Not on file   Intimate Partner Violence: Not on file   Housing Stability: Not on file     No current facility-administered medications for this encounter.      Current Outpatient Medications   Medication Sig Dispense Refill    dicyclomine (BENTYL) 20 MG tablet Take 1 tablet by mouth See Admin Instructions One tablet by mouth every 6-8 hours when necessary for abdominal pain or cramping. 20 tablet 0     No Known Allergies    Nursing Notes Reviewed    Physical Exam:  ED Triage Vitals [01/07/23 1422]   Enc Vitals Group      /64      Heart Rate 62      Resp 15      Temp 98.2 °F (36.8 °C)      Temp Source Oral      SpO2 97 %      Weight       Height       Head Circumference       Peak Flow       Pain Score       Pain Loc       Pain Edu? Excl. in [address]? GENERAL APPEARANCE: Awake and alert. Cooperative. No acute distress. HEAD: Normocephalic. Atraumatic. EYES: EOM's grossly intact. Sclera anicteric. ENT: Tolerates saliva. No trismus. NECK: Supple. Trachea midline. CARDIO: RRR. Radial pulse 2+. LUNGS: Respirations unlabored. CTAB. ABDOMEN: Soft. Non-distended. Non-tender. EXTREMITIES: No acute deformities. SKIN: Warm and dry. NEUROLOGICAL: No gross facial drooping. Moves all 4 extremities spontaneously. PSYCHIATRIC: Occasional inappropriate laughter.     Labs:  Results for orders placed or performed during the hospital encounter of 01/07/23   CBC with Auto Differential   Result Value Ref Range    WBC 6.9 4.0 - 10.5 K/CU MM    RBC 4.66 4.2 - 5.4 M/CU MM    Hemoglobin 13.7 12.5 - 16.0 GM/DL    Hematocrit 42.3 37 - 47 %    MCV 90.8 78 - 100 FL    MCH 29.4 27 - 31 PG    MCHC 32.4 32.0 - 36.0 %    RDW 12.9 11.7 - 14.9 %    Platelets 436 386 - 279 K/CU MM    MPV 10.8 7.5 - 11.1 FL    Differential Type AUTOMATED DIFFERENTIAL     Segs Relative 53.1 36 - 66 %    Lymphocytes % 31.9 24 - 44 %    Monocytes % 12.1 (H) 0 - 4 %    Eosinophils % 1.9 0 - 3 %    Basophils % 0.6 0 - 1 %    Segs Absolute 3.7 K/CU MM    Lymphocytes Absolute 2.2 K/CU MM    Monocytes Absolute 0.8 K/CU MM    Eosinophils Absolute 0.1 K/CU MM    Basophils Absolute 0.0 K/CU MM    Nucleated RBC % 0.0 %    Total Nucleated RBC 0.0 K/CU MM    Total Immature Neutrophil 0.03 K/CU MM    Immature Neutrophil % 0.4 0 - 0.43 %   Comprehensive Metabolic Panel w/ Reflex to MG   Result Value Ref Range    Sodium 139 135 - 145 MMOL/L    Potassium 4.0 3.5 - 5.1 MMOL/L    Chloride 103 99 - 110 mMol/L    CO2 28 21 - 32 MMOL/L    BUN 8 6 - 23 MG/DL    Creatinine 0.6 0.6 - 1.1 MG/DL    Est, Glom Filt Rate >60 >60 mL/min/1.73m2    Glucose 75 70 - 99 MG/DL    Calcium 9.0 8.3 - 10.6 MG/DL    Albumin 4.4 3.4 - 5.0 GM/DL    Total Protein 7.6 6.4 - 8.2 GM/DL    Total Bilirubin 0.1 0.0 - 1.0 MG/DL    ALT 8 (L) 10 - 40 U/L    AST 12 (L) 15 - 37 IU/L    Alkaline Phosphatase 48 40 - 129 IU/L    Anion Gap 8 4 - 16   Urine Drug Screen   Result Value Ref Range    Cannabinoid Scrn, Ur UNCONFIRMED POSITIVE (A) NEGATIVE    Amphetamines NEGATIVE NEGATIVE    Cocaine Metabolite NEGATIVE NEGATIVE    Benzodiazepine Screen, Urine NEGATIVE NEGATIVE    Barbiturate Screen, Ur NEGATIVE NEGATIVE    Opiates, Urine NEGATIVE NEGATIVE    Phencyclidine, Urine NEGATIVE NEGATIVE    Oxycodone NEGATIVE NEGATIVE   Salicylate   Result Value Ref Range    Salicylate Lvl <6.8 (L) 15 - 30 MG/DL    DOSE AMOUNT DOSE AMT. GIVEN - UNKNOWN     DOSE TIME DOSE TIME GIVEN - UNKNOWN    Acetaminophen Level   Result Value Ref Range    Acetaminophen Level <5.0 (L) 15 - 30 ug/ml    DOSE AMOUNT DOSE AMT. GIVEN - UNKNOWN     DOSE TIME DOSE TIME GIVEN - UNKNOWN    Ethanol   Result Value Ref Range    Alcohol Scrn <0.01 <0.01 %WT/VOL   Pregnancy, Urine   Result Value Ref Range    Pregnancy, Urine NEGATIVE NEGATIVE    Specific Gravity, Urine <=1.005 1.001 - 1.035    Interpretation HCG METHOD LIMITATIONS:        CC/HPI Summary, DDx, ED Course, and Reassessment: Patient has remained calm under suicide precautions here. Has a noted history of psychosis, bipolar disorder, and polysubstance abuse per review of records.     History from : Patient    Limitations to history : Behavior    Patient was given the following medications:  Medications - No data to display      Discussion with Other Profesionals : Social Work Meeta    Social Determinants : Patient is Homeless    Records Reviewed : External ED Note patient was seen here about a week ago for similar concerns. Disposition Considerations (tests considered but not done, Shared Decision Making, Pt Expectation of Test or Tx.):     Patient is medically stable at this time. Care endorsed to Dr. Eduardo Lovell pending psychiatric evaluation    I am the Primary Clinician of Record. Final Impression:  1. Mental health problem      DISPOSITION        Patient referred to: No follow-up provider specified. Discharge medications:  New Prescriptions    No medications on file     (Please note that portions of this note may have been completed with a voice recognition program. Efforts were made to edit the dictations but occasionally words are mis-transcribed.)    Sybil Burris, DO Sybil Burris,   01/07/23 1441      1/8/23   Patient care assumed from Dr. Judith Gross at 0600 this morning. Patient now under pink slip for safety after reporting suicidal ideation to Dr. Jovana Salinas. Has also been experiencing visual hallucinations in the ED. Is found to be positive for COVID-19 although has no reported symptoms to myself. Patient is medically stable pending inpatient placement. No acute events. Care signed out to Dr. Audra Verdugo.      Sybil Burris,   01/08/23 8714

## 2023-01-07 NOTE — ED NOTES
Pt positive for covid, door is now cracked, sitter 1:1 at computer.       Deondre Em RN  01/07/23 5850
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
## 2023-01-07 NOTE — ED NOTES
Pt displays irritation with security refusing to accept ketchup, yelling at this RN. This RN requested medication to help aid pt to remain calm. Pt agreeable to take  Po medication. Dr. Dedra Wong to enter medication.       Godwin Alcala RN  01/07/23 5759

## 2023-01-07 NOTE — ED NOTES
Pt currently upset that dietary did not bring enough ketchup with her meal. Pt yelling \"I got my hope up for nothing. This always happens. \" This RN attempted to assess patients needs at this time. Pt yelling \"you don't get to talk to me. \", pacing. This RN called security to bedside.         Madeline Murry, RN  01/07/23 8179

## 2023-01-07 NOTE — ED NOTES
This RN attempted to phone dietary to order food tray. This RN spoke to dietary and placed order of tuna salad, french fries, and lisha mist at this time.       Heather Parekh RN  01/07/23 1531

## 2023-01-07 NOTE — ED NOTES
Pt accepted po medication. Post medication administration, pt began speaking to \"the man in the room. \" This RN and pt were alone in ED23. Pt tells \"the man\", \"this nurse is terrible she hasn't done anything for me. \" Pt then looks to this RN, pauses as if hearing someone speak and states \"I'm not talking to you bitch. \" This RN attempts to calm pt, asks if the pt is hearing \"the man\" speak to her, she responds \"yes\".       Cornell Olson, MOLINA  01/07/23 8351

## 2023-01-07 NOTE — ACP (ADVANCE CARE PLANNING)
Patient does not have any ACP documents/Medical Power of . LSW notes hospital will follow [place]'s Next of Kin hierarchy in the following descending order for priority:    Guardian  Spouse  [de-identified] of adult Children  Parents  [de-identified] of adult Siblings  Nearest Relative not described above    Per [place]'s Next of Kin hierarchy: Patients' Parent will be [address].

## 2023-01-07 NOTE — ED NOTES
Pt denies SI/HI at this time, states she \"just wants to go to [facility] because there are people to talk to , a place to stay, food, and therapy. \" When asked for pt hx, pt refuses to give any information at this time, pt states \"you have it on file here and so does [facility]. \" Pt changed into green gown, belongings gathered.       John Maynard RN  01/07/23 2852

## 2023-01-08 NOTE — ED NOTES
This tech walked pt to the bathroom. When returned to room this tech asked if the pt needed anything else twice, pt refused to answer.      Cj Coker  01/07/23 1936

## 2023-01-08 NOTE — ED NOTES
Late entry due to pt care, report received, MAR and orders reviewed, chart check completed. Infant remains on HFNC 1 LPM FiO2 currently 31%.    Report received from [facility], RN  01/08/23 0215

## 2023-01-08 NOTE — ED NOTES
Pt requested that the temperature in her room be turn to 70 degrees. This tech explained to pt that the thermostat is set as warm as it will go. Pt responded \"you are lying\". This tech asked if I could get pt anything else before leaving and she refused to answer.       Eladio Cordero  01/07/23 2006

## 2023-01-08 NOTE — ED PROVIDER NOTES
Patient is endorsed to me by Dr. Alexis Bacon at 0000. In short, patient presented with suicidal ideation. The patient was placed in suicide precautions, patient's clothing and belongings were removed, documented and stored in the emergency department. Patient was reported to me to be medically cleared. I have examined the patient and noted a normal exam and stable vitals. Mental health have evaluated the patient and have recommended that the patient be transferred to a inpatient psychiatric facility. We are currently awaiting placement for the patient. 0600:a.m.  I have signed out [facility] Emergency Department care to Dr. Gardenia Jimenez. We discussed the pertinent history, physical exam, completed/pending test results (if applicable) and current treatment plan. Please refer to his/her chart for the patients remaining Emergency Department course and final disposition.             Bubba Sharma MD  01/08/23 7696
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
## 2023-01-08 NOTE — ED NOTES
Patient in [place] agitated with sitter,cussing,security notified,security to the room patient states she does not have to listen to anyone and walked out of the room and into the restroom,staff at restroom     Jasson Hutchinson RN  01/08/23 1300

## 2023-01-08 NOTE — ED NOTES
Pt became verbally aggressive and loud towards staff. Security had to be called to bedside.  Pt is telling nurse \" this is not a real hospital, you aren't even a real nurse\"      Derick El, MOLINA  01/08/23 2703

## 2023-01-08 NOTE — ED NOTES
Pt is refusing vitals and questions from nurse.  Nurse had brought patient snacks and ordered lunch from cafeteria per request. Paramjit Capps hung up before speaking with nurse regarding plan of care      Madina Foy RN  01/08/23 4481

## 2023-01-08 NOTE — ED NOTES
Patient: Keyana Cabral    Procedure Summary     Date: 10/23/20 Room / Location:  OR 01 / SURGERY [facility]    Anesthesia Start: 1104 Anesthesia Stop: 1313    Procedure: REPAIR, HERNIA, VENTRAL, ROBOT-ASSISTED, USING DA HILARIO XI - INCARCERATED WITH MESH PLACEMENT (Abdomen) Diagnosis: (INCARCERATED VENTRAL HERNIA)    Surgeon: Breonna Nuñez M.D. Responsible Provider: Nayeli Sanders M.D.    Anesthesia Type: general ASA Status: 2          Final Anesthesia Type: general  Last vitals  BP   Blood Pressure : 115/79    Temp   36.1 °C (97 °F)    Pulse   Pulse: 90   Resp   16    SpO2   92 %      Anesthesia Post Evaluation    Patient location during evaluation: PACU  Patient participation: complete - patient participated  Level of consciousness: awake and alert    Airway patency: patent  Anesthetic complications: no  Cardiovascular status: hemodynamically stable  Respiratory status: acceptable  Hydration status: euvolemic    PONV: none           Nurse Pain Score: 0 (NPRS)         Pt states \" ya'll gave me a fake covid test with fake results, this shit aint even real\" nurse attempted to educate pt on the covid results and testing process but pt is unable to carry an appropriate conversation with staff.       Arjun Will RN  01/08/23 5437

## 2023-01-08 NOTE — ED NOTES
Report received from Marylen Pugh, care transferred at this time.       MOLINA Shipley  01/08/23 4166

## 2023-01-09 VITALS
RESPIRATION RATE: 16 BRPM | TEMPERATURE: 98.2 F | OXYGEN SATURATION: 100 % | HEART RATE: 64 BPM | DIASTOLIC BLOOD PRESSURE: 67 MMHG | SYSTOLIC BLOOD PRESSURE: 107 MMHG

## 2023-01-09 LAB
SARS-COV-2, NAAT: DETECTED
SOURCE: ABNORMAL

## 2023-01-09 PROCEDURE — 87635 SARS-COV-2 COVID-19 AMP PRB: CPT

## 2023-01-09 ASSESSMENT — PAIN - FUNCTIONAL ASSESSMENT: PAIN_FUNCTIONAL_ASSESSMENT: NONE - DENIES PAIN

## 2023-01-09 NOTE — SUICIDE SAFETY PLAN
SAFETY PLAN    Pt encouraged to follow up with outpatient mental health. Pt encouraged to remove any weapons, sharp objects, and unused medications from the home. Pt encouraged to refrain from using drugs or alcohol. Pt provided with information and resources. Pt encouraged to return to the Emergency Department if symptoms worsen or if they begin to feel suicidal or homicidal. Pt is adamant that they are not suicidal or homicidal. Pt verbalized understanding and agreement. Pt guarantees their safety upon discharge. Pt is to be discharged and sent home with sister.

## 2023-01-09 NOTE — ED PROVIDER NOTES
Patient was handed off to me by colleague Dr. Roc Soria pending reevaluation today by psychiatrist Dr. Beth Hopkins. Patient is a 28-year-old female with PTSD and depression coming in for suicidal ideation. Patient needing inpatient admission and currently awaiting placement. Patient has tested positive for COVID-19 virus infection. Patient refusing repeat COVID test. Patient stable during my shift. I have reached the end of my shift and patient handed off to colleague Dr. Nils Reyes pending psychiatric admission.      Chris Elena DO  01/08/23 7885

## 2023-01-09 NOTE — ED NOTES
Report from [facility]. Care assumed at this time.        Mohan [facility]  01/09/23 8478

## 2023-01-09 NOTE — ED NOTES
Pt woke up asking staff for food. Pt states \"I've been yelling hello for hours and no one has come in here. \" Explained that a sitter has been observing pt and that pt has been asleep for majority of this shift. Pt denies being asleep. Pt states \" You can come over here and look at me with crazy eyes but you're the same as her and him and everyone else you can't go home and lay down and he can't go home and lay down and she can't go home and lay down so you work for me and you have to stand here and listen to me\"    Pt agreeable to vitals. Pt states \" you can come in here and do your little job and then get out because I have nothing to say I don't have to talk to you so just do it and leave. \"     Jayce Gama RN at bedside and pt agrees to repeat covid test. Provided pt with microwave meal as cafe is currently closed.      Pt states \"Go ahead and do whatever that little girl has a hold on me and it's a little hold so after my three days are over I'll leave and be out in the real world where I know what's going on because I haven't displayed and symptoms I have no psych symptoms so this isn't going to hold me\"     Camille Garcia RN  01/09/23 0120

## 2023-01-09 NOTE — ED PROVIDER NOTES
01/09/23:a.migdalia Zaragoza was checked out to me by Dr. Dedrick Garcia. Please see his/her initial documentation for details of the patient's ED presentation, physical exam and completed studies.     In brief, Peter Zaragoza is a 33 y.o. female that presents with complaint of depression SI awaiting placement COVID-positive    I have reviewed and interpreted all of the currently available lab results from this visit (if applicable):  Results for orders placed or performed during the hospital encounter of 01/07/23   COVID-19, Rapid    Specimen: Nasopharyngeal   Result Value Ref Range    Source UNKNOWN     SARS-CoV-2, NAAT DETECTED (A) NOT DETECTED   Rapid Flu Swab    Specimen: Nasopharyngeal   Result Value Ref Range    Rapid Influenza A Ag NEGATIVE NEGATIVE    Rapid Influenza B Ag NEGATIVE NEGATIVE   COVID-19, Rapid    Specimen: Nasopharyngeal   Result Value Ref Range    Source NARES     SARS-CoV-2, NAAT DETECTED (A) NOT DETECTED   CBC with Auto Differential   Result Value Ref Range    WBC 6.9 4.0 - 10.5 K/CU MM    RBC 4.66 4.2 - 5.4 M/CU MM    Hemoglobin 13.7 12.5 - 16.0 GM/DL    Hematocrit 42.3 37 - 47 %    MCV 90.8 78 - 100 FL    MCH 29.4 27 - 31 PG    MCHC 32.4 32.0 - 36.0 %    RDW 12.9 11.7 - 14.9 %    Platelets 487 293 - 186 K/CU MM    MPV 10.8 7.5 - 11.1 FL    Differential Type AUTOMATED DIFFERENTIAL     Segs Relative 53.1 36 - 66 %    Lymphocytes % 31.9 24 - 44 %    Monocytes % 12.1 (H) 0 - 4 %    Eosinophils % 1.9 0 - 3 %    Basophils % 0.6 0 - 1 %    Segs Absolute 3.7 K/CU MM    Lymphocytes Absolute 2.2 K/CU MM    Monocytes Absolute 0.8 K/CU MM    Eosinophils Absolute 0.1 K/CU MM    Basophils Absolute 0.0 K/CU MM    Nucleated RBC % 0.0 %    Total Nucleated RBC 0.0 K/CU MM    Total Immature Neutrophil 0.03 K/CU MM    Immature Neutrophil % 0.4 0 - 0.43 %   Comprehensive Metabolic Panel w/ Reflex to MG   Result Value Ref Range    Sodium 139 135 - 145 MMOL/L    Potassium 4.0 3.5 - 5.1 MMOL/L    Chloride 103 99 - 110 mMol/L    CO2 28 21 - 32 MMOL/L    BUN 8 6 - 23 MG/DL    Creatinine 0.6 0.6 - 1.1 MG/DL    Est, Glom Filt Rate >60 >60 mL/min/1.73m2    Glucose 75 70 - 99 MG/DL    Calcium 9.0 8.3 - 10.6 MG/DL    Albumin 4.4 3.4 - 5.0 GM/DL    Total Protein 7.6 6.4 - 8.2 GM/DL    Total Bilirubin 0.1 0.0 - 1.0 MG/DL    ALT 8 (L) 10 - 40 U/L    AST 12 (L) 15 - 37 IU/L    Alkaline Phosphatase 48 40 - 129 IU/L    Anion Gap 8 4 - 16   Urine Drug Screen   Result Value Ref Range    Cannabinoid Scrn, Ur UNCONFIRMED POSITIVE (A) NEGATIVE    Amphetamines NEGATIVE NEGATIVE    Cocaine Metabolite NEGATIVE NEGATIVE    Benzodiazepine Screen, Urine NEGATIVE NEGATIVE    Barbiturate Screen, Ur NEGATIVE NEGATIVE    Opiates, Urine NEGATIVE NEGATIVE    Phencyclidine, Urine NEGATIVE NEGATIVE    Oxycodone NEGATIVE NEGATIVE   Salicylate   Result Value Ref Range    Salicylate Lvl <0.3 (L) 15 - 30 MG/DL    DOSE AMOUNT DOSE AMT. GIVEN - UNKNOWN     DOSE TIME DOSE TIME GIVEN - UNKNOWN    Acetaminophen Level   Result Value Ref Range    Acetaminophen Level <5.0 (L) 15 - 30 ug/ml    DOSE AMOUNT DOSE AMT. GIVEN - UNKNOWN     DOSE TIME DOSE TIME GIVEN - UNKNOWN    Ethanol   Result Value Ref Range    Alcohol Scrn <0.01 <0.01 %WT/VOL   Pregnancy, Urine   Result Value Ref Range    Pregnancy, Urine NEGATIVE NEGATIVE    Specific Gravity, Urine <=1.005 1.001 - 1.035    Interpretation HCG METHOD LIMITATIONS:        MDM:    Patient with complaint of depression SI COVID-positive awaiting placement. Patient seen by mental health, cleared for discharge, safety plan in place, sister is coming to pick her up and watch her. Patient promises me she is not suicidal, given return precautions and follow up info.    Final Impression:  1. Suicidal ideation    2. Hallucinations    3. COVID-19        (Please note that portions of this note may have been completed with a voice recognition program. Efforts were made to edit the dictations but occasionally words are  mis-transcribed.)    DO Virginia Loya DO  01/09/23 1053

## 2023-01-09 NOTE — ED NOTES
Discharge instructions reviewed with pt. All questions answered at this time. Pt verbalized understanding.       Shakir Christianson RN  01/09/23 7918

## 2023-01-09 NOTE — ED NOTES
MSW discussing pt case with psychiatrist Dr Latoya Doshi on the phone. States that pt can be discharged home with sister.      LUCRECIA Singer  01/09/23 2996

## 2023-01-09 NOTE — ED NOTES
Breakfast tray ordered for pt.       Christian Patel, [facility]  01/09/23 0454

## 2023-05-23 ENCOUNTER — HOSPITAL ENCOUNTER (EMERGENCY)
Age: 28
Discharge: PSYCHIATRIC OR SUBSTANCE USE FACILITY | End: 2023-05-25
Attending: EMERGENCY MEDICINE
Payer: MEDICAID

## 2023-05-23 DIAGNOSIS — F39 MOOD DISORDER (HCC): Primary | ICD-10-CM

## 2023-05-23 PROCEDURE — 96372 THER/PROPH/DIAG INJ SC/IM: CPT

## 2023-05-23 PROCEDURE — 99285 EMERGENCY DEPT VISIT HI MDM: CPT

## 2023-05-24 LAB
ACETAMINOPHEN LEVEL: <5 UG/ML (ref 15–30)
ALBUMIN SERPL-MCNC: 5 GM/DL (ref 3.4–5)
ALCOHOL SCREEN SERUM: <0.01 %WT/VOL
ALP BLD-CCNC: 55 IU/L (ref 40–128)
ALT SERPL-CCNC: 10 U/L (ref 10–40)
ANION GAP SERPL CALCULATED.3IONS-SCNC: 16 MMOL/L (ref 4–16)
AST SERPL-CCNC: 20 IU/L (ref 15–37)
BASOPHILS ABSOLUTE: 0.1 K/CU MM
BASOPHILS RELATIVE PERCENT: 0.5 % (ref 0–1)
BILIRUB SERPL-MCNC: 1 MG/DL (ref 0–1)
BUN SERPL-MCNC: 8 MG/DL (ref 6–23)
CALCIUM SERPL-MCNC: 9.6 MG/DL (ref 8.3–10.6)
CHLORIDE BLD-SCNC: 100 MMOL/L (ref 99–110)
CO2: 22 MMOL/L (ref 21–32)
CREAT SERPL-MCNC: 0.8 MG/DL (ref 0.6–1.1)
DIFFERENTIAL TYPE: ABNORMAL
DOSE AMOUNT: ABNORMAL
DOSE AMOUNT: ABNORMAL
DOSE TIME: ABNORMAL
DOSE TIME: ABNORMAL
EOSINOPHILS ABSOLUTE: 0.1 K/CU MM
EOSINOPHILS RELATIVE PERCENT: 0.6 % (ref 0–3)
GFR SERPL CREATININE-BSD FRML MDRD: >60 ML/MIN/1.73M2
GLUCOSE SERPL-MCNC: 110 MG/DL (ref 70–99)
HCG QUALITATIVE: NEGATIVE
HCT VFR BLD CALC: 39.4 % (ref 37–47)
HEMOGLOBIN: 13.8 GM/DL (ref 12.5–16)
IMMATURE NEUTROPHIL %: 0.3 % (ref 0–0.43)
LYMPHOCYTES ABSOLUTE: 2.6 K/CU MM
LYMPHOCYTES RELATIVE PERCENT: 19.5 % (ref 24–44)
MCH RBC QN AUTO: 29.9 PG (ref 27–31)
MCHC RBC AUTO-ENTMCNC: 35 % (ref 32–36)
MCV RBC AUTO: 85.3 FL (ref 78–100)
MONOCYTES ABSOLUTE: 1.3 K/CU MM
MONOCYTES RELATIVE PERCENT: 9.7 % (ref 0–4)
NUCLEATED RBC %: 0 %
PDW BLD-RTO: 12.8 % (ref 11.7–14.9)
PLATELET # BLD: 282 K/CU MM (ref 140–440)
PMV BLD AUTO: 10.8 FL (ref 7.5–11.1)
POTASSIUM SERPL-SCNC: 3.6 MMOL/L (ref 3.5–5.1)
RBC # BLD: 4.62 M/CU MM (ref 4.2–5.4)
SALICYLATE LEVEL: <0.3 MG/DL (ref 15–30)
SARS-COV-2 RDRP RESP QL NAA+PROBE: NOT DETECTED
SEGMENTED NEUTROPHILS ABSOLUTE COUNT: 9.1 K/CU MM
SEGMENTED NEUTROPHILS RELATIVE PERCENT: 69.4 % (ref 36–66)
SODIUM BLD-SCNC: 138 MMOL/L (ref 135–145)
SOURCE: NORMAL
TOTAL IMMATURE NEUTOROPHIL: 0.04 K/CU MM
TOTAL NUCLEATED RBC: 0 K/CU MM
TOTAL PROTEIN: 7.8 GM/DL (ref 6.4–8.2)
WBC # BLD: 13.1 K/CU MM (ref 4–10.5)

## 2023-05-24 PROCEDURE — 80053 COMPREHEN METABOLIC PANEL: CPT

## 2023-05-24 PROCEDURE — G0480 DRUG TEST DEF 1-7 CLASSES: HCPCS

## 2023-05-24 PROCEDURE — 6360000002 HC RX W HCPCS: Performed by: PHYSICIAN ASSISTANT

## 2023-05-24 PROCEDURE — 80307 DRUG TEST PRSMV CHEM ANLYZR: CPT

## 2023-05-24 PROCEDURE — 87635 SARS-COV-2 COVID-19 AMP PRB: CPT

## 2023-05-24 PROCEDURE — 85025 COMPLETE CBC W/AUTO DIFF WBC: CPT

## 2023-05-24 PROCEDURE — 84703 CHORIONIC GONADOTROPIN ASSAY: CPT

## 2023-05-24 RX ORDER — LORAZEPAM 2 MG/ML
2 INJECTION INTRAMUSCULAR EVERY 4 HOURS PRN
Status: DISCONTINUED | OUTPATIENT
Start: 2023-05-24 | End: 2023-05-25 | Stop reason: HOSPADM

## 2023-05-24 RX ORDER — DIPHENHYDRAMINE HYDROCHLORIDE 50 MG/ML
50 INJECTION INTRAMUSCULAR; INTRAVENOUS ONCE
Status: COMPLETED | OUTPATIENT
Start: 2023-05-24 | End: 2023-05-24

## 2023-05-24 RX ORDER — DIPHENHYDRAMINE HYDROCHLORIDE 50 MG/ML
50 INJECTION INTRAMUSCULAR; INTRAVENOUS EVERY 6 HOURS PRN
Status: DISCONTINUED | OUTPATIENT
Start: 2023-05-24 | End: 2023-05-25 | Stop reason: HOSPADM

## 2023-05-24 RX ORDER — LORAZEPAM 2 MG/ML
2 INJECTION INTRAMUSCULAR ONCE
Status: COMPLETED | OUTPATIENT
Start: 2023-05-24 | End: 2023-05-24

## 2023-05-24 RX ORDER — HALOPERIDOL 5 MG/ML
5 INJECTION INTRAMUSCULAR ONCE
Status: COMPLETED | OUTPATIENT
Start: 2023-05-24 | End: 2023-05-24

## 2023-05-24 RX ORDER — HALOPERIDOL 5 MG/ML
5 INJECTION INTRAMUSCULAR EVERY 4 HOURS PRN
Status: DISCONTINUED | OUTPATIENT
Start: 2023-05-24 | End: 2023-05-25 | Stop reason: HOSPADM

## 2023-05-24 RX ADMIN — HALOPERIDOL LACTATE 5 MG: 5 INJECTION, SOLUTION INTRAMUSCULAR at 00:15

## 2023-05-24 RX ADMIN — LORAZEPAM 2 MG: 2 INJECTION INTRAMUSCULAR; INTRAVENOUS at 00:15

## 2023-05-24 RX ADMIN — DIPHENHYDRAMINE HYDROCHLORIDE 50 MG: 50 INJECTION, SOLUTION INTRAMUSCULAR; INTRAVENOUS at 00:16
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
## 2023-05-24 NOTE — ED NOTES
Pt having hallucinations, using word salad and flight of ideas. Pt not cooperating with staff and stanidng at door.      Africa Strong RN  05/24/23 0002

## 2023-05-24 NOTE — ED NOTES
Leonie called and stated pt was starting to fall out of bed. When I came to pts room, pt's top half of her body was hanging off the side of the bed. Pt was still asleep. For pt's safety, all side rails were raised up. Will continue to monitor and once pt awakens, side rails can be lowered.       Ramón Villagomez RN  05/24/23 0230
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
## 2023-05-24 NOTE — ED NOTES
Will refer patient for further evaluation and treatment once UDS is completed.      Kerri Hare, FELICITY  05/24/23 1152
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
## 2023-05-24 NOTE — ED NOTES
Bedside report recevied from    [facility]. Assumed care at this time.        Liliana Campos RN  05/24/23 4516
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
## 2023-05-24 NOTE — ED NOTES
Patient in room at doorway. Sitter at bedside. Security in hallway/doorway of patient room. Patient speaking as flight of ideas. This RN approached patient in room and speaking to her about coming to sit on the bed. Patient continues speaking to several different ideas and conversations, changing the topic quickly and frequently. Patient agreeing to come over to bed and sit down so this RN can obtain blood work for patient. No other RN in zone 1 at this time. Exited ED24 to obtain supplies to obtain blood specimens. Reentered room, patient again at doorway, speaking fast and continues speaking to different topics. Deescalating techniques used to again help speak with patient and attempt to get her to sit on the side of the bed to draw her blood. Patient began to agree, but then states \"wait, you are drawing my blood? \". I replied yes, that I was the RN and I am a trained professional and licensed to obtain blood specimens. Patient then replied \"no, you are too young! \". Yudi Morgan RN standing behind this RN with medications for patient and asked patient if she wanted her to draw her blood since she was older, in which the patient replied yes. This RN exited room so Ana Paula Rey could draw blood.       Lorie Barajas, MOLINA  05/24/23 2447

## 2023-05-24 NOTE — ED PROVIDER NOTES
52-year-old female with markable psych history was brought to the emergency department with concern for acute psychosis and behavioral disturbance. She is brought in on a pink slip. She was seen by DIPIKA. Screening labs have been obtained. She is medically clear. She did have some agitation in the emergency department that was managed by chemical sedation. She will be evaluated by psychiatry. She is currently awaiting evaluation by psychiatry. Final disposition will be pending recommendation of psychiatry. She will be signed out to oncoming physician who will follow results of disposition.     Sanam Jordan MD  5/24/2023  3:18 AM       Sanam Jordan MD  05/24/23 6863

## 2023-05-24 NOTE — ED PROVIDER NOTES
Emergency Department Encounter  Location: [address] [place] [address] EMERGENCY DEPARTMENT    Patient: Tanya Rivera  MRN: [id number]  : 1995  Date of evaluation: 2023  ED Provider: Maricruz Mensah MD    3:04 PM EDT  Tanya Rivera was checked out to me by  Dr. Imelda Perry. Please see his/her initial documentation for details of the patient's initial ED presentation, physical exam and completed studies. In brief, Tanya Rivera is a 33 y.o. female that presented to the emergency department for  psychosis . The patient was placed in precautions, patient's clothing and belongings were removed, documented and stored in the emergency department. Patient was reported to me to be medically stable. Mental health have evaluated the patient and have recommended that the patient be transferred to a inpatient psychiatric facility. We are currently awaiting placement for the patient. I have reviewed and interpreted all of the currently available lab results and diagnostics from this visit:  Results for orders placed or performed during the hospital encounter of 23   COVID-19, Rapid    Specimen: Nasopharyngeal   Result Value Ref Range    Source UNKNOWN     SARS-CoV-2, NAAT NOT DETECTED NOT DETECTED   Ethanol   Result Value Ref Range    Alcohol Scrn <0.01 <3.56 %WT/VOL   Salicylate   Result Value Ref Range    Salicylate Lvl <7.8 (L) 15 - 30 MG/DL    DOSE AMOUNT DOSE AMT. GIVEN - Unknown     DOSE TIME DOSE TIME GIVEN - Unknown    Acetaminophen Level   Result Value Ref Range    Acetaminophen Level <5.0 (L) 15 - 30 ug/ml    DOSE AMOUNT DOSE AMT.  GIVEN - UNKNOWN     DOSE TIME DOSE TIME GIVEN - UNKNOWN    CBC with Auto Differential   Result Value Ref Range    WBC 13.1 (H) 4.0 - 10.5 K/CU MM    RBC 4.62 4.2 - 5.4 M/CU MM    Hemoglobin 13.8 12.5 - 16.0 GM/DL    Hematocrit 39.4 37 - 47 %    MCV 85.3 78 - 100 FL    MCH 29.9 27 - 31 PG    MCHC 35.0 32.0 - 36.0 %    RDW 12.8 11.7 - 14.9 %

## 2023-05-24 NOTE — ED NOTES
Patient has not given urine for UDS. Once UDS has been resulted, placement efforts can begin. Patient remains lethargic. Sitter aware urine is needed. Patient has not been able to provide urine  yet.      FELICITY Silverman  05/24/23 7048 FREE:[LAST:[[facility]],FIRST:[[facility]],PHONE:[phone]],FAX:[(   )    -],ADDRESS:[[facility]   [address] [place] [address]  Phone: [phone]]]

## 2023-05-24 NOTE — ED NOTES
Attempted to wake pt to get urine sample. Pt would open eyes to look at this nurse, then would shut her eyes back and go back to sleep. Will attempt again at a later time.       Duy Miller RN  05/24/23 5823

## 2023-05-24 NOTE — ED NOTES
Shift report received from Jeff Westbrook, [facility]. Care assumed.       Gato Kenney RN  05/24/23 2515
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
## 2023-05-25 VITALS
RESPIRATION RATE: 16 BRPM | TEMPERATURE: 97.5 F | DIASTOLIC BLOOD PRESSURE: 67 MMHG | BODY MASS INDEX: 21.66 KG/M2 | HEART RATE: 83 BPM | OXYGEN SATURATION: 99 % | HEIGHT: 65 IN | SYSTOLIC BLOOD PRESSURE: 82 MMHG | WEIGHT: 130 LBS

## 2023-05-25 LAB
AMPHETAMINES: NEGATIVE
BARBITURATE SCREEN URINE: NEGATIVE
BENZODIAZEPINE SCREEN, URINE: NEGATIVE
CANNABINOID SCREEN URINE: ABNORMAL
COCAINE METABOLITE: ABNORMAL
FENTANYL URINE: NEGATIVE
OPIATES, URINE: NEGATIVE
OXYCODONE: NEGATIVE

## 2023-05-25 NOTE — ED NOTES
This nurse offered to take patient to the restroom. Patient stated she needed to go to the restroom but then changed her mind and refused to go. Patient concerned about conversations being held by reece and this nurse. Advised patient conversation pertained to obtaining vitals.       Kiki Martinez RN  05/25/23 7080

## 2023-05-25 NOTE — ED NOTES
Attempted to complete C-SSRS while updating vitals. Patient got upset answered no to having thoughts of harming herself. Patient upset that I asked the question while taking vitals refused any further questions. Patient speaking word salad and flight of ideas.       Trina Campos RN  05/25/23 MOLINA Licea  05/25/23 9664

## 2023-05-25 NOTE — ED NOTES
Pt accepted to [facility] by Dr. Salvatore Eddy.  9th floor  [phone]     Howard Mayer  05/25/23 2839

## 2023-05-25 NOTE — ED NOTES
This nurse gave N2N report to Julissa carter [facility] at this time.       Denzel Mahoney, MOLINA  05/25/23 7425

## 2023-05-25 NOTE — ED PROVIDER NOTES
Patient is endorsed to me by Dr. Hugo Alcala at [phone]. In short, patient presented with  acute psychosis . The patient was placed in suicide precautions, patient's clothing and belongings were removed, documented and stored in the emergency department. Patient was reported to me to be medically cleared and hemodynamically stable awaiting behavioral health evaluation. Currently awaiting input from behavioral health specialist for disposition. Patient was evaluated and will be transferred to [facility].           Luís Sung MD  05/25/23 2444
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
## 2023-06-01 ENCOUNTER — HOSPITAL ENCOUNTER (EMERGENCY)
Age: 28
Discharge: OTHER INSTITUTION | End: 2023-06-02
Attending: EMERGENCY MEDICINE
Payer: MEDICAID

## 2023-06-01 DIAGNOSIS — F48.9 MENTAL HEALTH PROBLEM: ICD-10-CM

## 2023-06-01 DIAGNOSIS — F30.9 MANIA (HCC): Primary | ICD-10-CM

## 2023-06-01 LAB
ACETAMINOPHEN LEVEL: <5 UG/ML (ref 15–30)
ALBUMIN SERPL-MCNC: 4.4 GM/DL (ref 3.4–5)
ALCOHOL SCREEN SERUM: <0.01 %WT/VOL
ALP BLD-CCNC: 44 IU/L (ref 40–129)
ALT SERPL-CCNC: 11 U/L (ref 10–40)
ANION GAP SERPL CALCULATED.3IONS-SCNC: 13 MMOL/L (ref 4–16)
AST SERPL-CCNC: 20 IU/L (ref 15–37)
BASOPHILS ABSOLUTE: 0 K/CU MM
BASOPHILS RELATIVE PERCENT: 0.4 % (ref 0–1)
BILIRUB SERPL-MCNC: 0.4 MG/DL (ref 0–1)
BUN SERPL-MCNC: 7 MG/DL (ref 6–23)
CALCIUM SERPL-MCNC: 8.6 MG/DL (ref 8.3–10.6)
CHLORIDE BLD-SCNC: 105 MMOL/L (ref 99–110)
CO2: 23 MMOL/L (ref 21–32)
CREAT SERPL-MCNC: 0.7 MG/DL (ref 0.6–1.1)
DIFFERENTIAL TYPE: ABNORMAL
DOSE AMOUNT: ABNORMAL
DOSE AMOUNT: ABNORMAL
DOSE TIME: ABNORMAL
DOSE TIME: ABNORMAL
EOSINOPHILS ABSOLUTE: 0.1 K/CU MM
EOSINOPHILS RELATIVE PERCENT: 1 % (ref 0–3)
GFR SERPL CREATININE-BSD FRML MDRD: >60 ML/MIN/1.73M2
GLUCOSE SERPL-MCNC: 89 MG/DL (ref 70–99)
GONADOTROPIN, CHORIONIC (HCG) QUANT: <1 UIU/ML
HCT VFR BLD CALC: 38 % (ref 37–47)
HEMOGLOBIN: 12.2 GM/DL (ref 12.5–16)
IMMATURE NEUTROPHIL %: 0.3 % (ref 0–0.43)
LYMPHOCYTES ABSOLUTE: 3 K/CU MM
LYMPHOCYTES RELATIVE PERCENT: 32.2 % (ref 24–44)
MCH RBC QN AUTO: 29.1 PG (ref 27–31)
MCHC RBC AUTO-ENTMCNC: 32.1 % (ref 32–36)
MCV RBC AUTO: 90.7 FL (ref 78–100)
MONOCYTES ABSOLUTE: 1.2 K/CU MM
MONOCYTES RELATIVE PERCENT: 12.4 % (ref 0–4)
NUCLEATED RBC %: 0 %
PDW BLD-RTO: 13 % (ref 11.7–14.9)
PLATELET # BLD: 220 K/CU MM (ref 140–440)
PMV BLD AUTO: 10.6 FL (ref 7.5–11.1)
POTASSIUM SERPL-SCNC: 3.8 MMOL/L (ref 3.5–5.1)
RBC # BLD: 4.19 M/CU MM (ref 4.2–5.4)
SALICYLATE LEVEL: <0.3 MG/DL (ref 15–30)
SARS-COV-2 RDRP RESP QL NAA+PROBE: NOT DETECTED
SEGMENTED NEUTROPHILS ABSOLUTE COUNT: 5 K/CU MM
SEGMENTED NEUTROPHILS RELATIVE PERCENT: 53.7 % (ref 36–66)
SODIUM BLD-SCNC: 141 MMOL/L (ref 135–145)
SOURCE: NORMAL
TOTAL IMMATURE NEUTOROPHIL: 0.03 K/CU MM
TOTAL NUCLEATED RBC: 0 K/CU MM
TOTAL PROTEIN: 7.2 GM/DL (ref 6.4–8.2)
TSH SERPL DL<=0.005 MIU/L-ACNC: 1.48 UIU/ML (ref 0.27–4.2)
WBC # BLD: 9.3 K/CU MM (ref 4–10.5)

## 2023-06-01 PROCEDURE — 80053 COMPREHEN METABOLIC PANEL: CPT

## 2023-06-01 PROCEDURE — G0480 DRUG TEST DEF 1-7 CLASSES: HCPCS

## 2023-06-01 PROCEDURE — 85025 COMPLETE CBC W/AUTO DIFF WBC: CPT

## 2023-06-01 PROCEDURE — 6360000002 HC RX W HCPCS: Performed by: EMERGENCY MEDICINE

## 2023-06-01 PROCEDURE — 87635 SARS-COV-2 COVID-19 AMP PRB: CPT

## 2023-06-01 PROCEDURE — 84702 CHORIONIC GONADOTROPIN TEST: CPT

## 2023-06-01 PROCEDURE — 84443 ASSAY THYROID STIM HORMONE: CPT

## 2023-06-01 PROCEDURE — 96372 THER/PROPH/DIAG INJ SC/IM: CPT

## 2023-06-01 PROCEDURE — 99285 EMERGENCY DEPT VISIT HI MDM: CPT

## 2023-06-01 RX ORDER — HALOPERIDOL 5 MG/ML
5 INJECTION INTRAMUSCULAR ONCE
Status: COMPLETED | OUTPATIENT
Start: 2023-06-01 | End: 2023-06-01

## 2023-06-01 RX ORDER — LORAZEPAM 2 MG/ML
2 INJECTION INTRAMUSCULAR ONCE
Status: COMPLETED | OUTPATIENT
Start: 2023-06-01 | End: 2023-06-01

## 2023-06-01 RX ORDER — DIPHENHYDRAMINE HYDROCHLORIDE 50 MG/ML
50 INJECTION INTRAMUSCULAR; INTRAVENOUS ONCE
Status: COMPLETED | OUTPATIENT
Start: 2023-06-01 | End: 2023-06-01

## 2023-06-01 RX ADMIN — HALOPERIDOL LACTATE 5 MG: 5 INJECTION, SOLUTION INTRAMUSCULAR at 18:57

## 2023-06-01 RX ADMIN — DIPHENHYDRAMINE HYDROCHLORIDE 50 MG: 50 INJECTION INTRAMUSCULAR; INTRAVENOUS at 18:56

## 2023-06-01 RX ADMIN — LORAZEPAM 2 MG: 2 INJECTION INTRAMUSCULAR; INTRAVENOUS at 18:57
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
## 2023-06-01 NOTE — ED NOTES
Code violet call for patient. Patient is refusing to be treated by going into room and undressing. Ana Burnett.  MOLINA Hitchcock  06/01/23 5960

## 2023-06-01 NOTE — PROGRESS NOTES
Code violet called by security shortly after she arrived by [facility]. Patient was showing erratic behavior, screaming and yelling. Unable to be redirectable. Patient escalated at the nurses presence. High risk of elopement and patient was unable to be de-escalated. Physician at bedside ordered medication IM for patients safety.

## 2023-06-02 VITALS
RESPIRATION RATE: 16 BRPM | OXYGEN SATURATION: 100 % | HEART RATE: 87 BPM | DIASTOLIC BLOOD PRESSURE: 63 MMHG | SYSTOLIC BLOOD PRESSURE: 117 MMHG | TEMPERATURE: 97.9 F

## 2023-06-02 LAB
AMPHETAMINES: NEGATIVE
BACTERIA: ABNORMAL /HPF
BARBITURATE SCREEN URINE: NEGATIVE
BENZODIAZEPINE SCREEN, URINE: NEGATIVE
BILIRUBIN URINE: ABNORMAL MG/DL
BLOOD, URINE: ABNORMAL
CANNABINOID SCREEN URINE: ABNORMAL
CLARITY: CLEAR
COCAINE METABOLITE: ABNORMAL
COLOR: YELLOW
FENTANYL URINE: NEGATIVE
GLUCOSE, URINE: NEGATIVE MG/DL
KETONES, URINE: ABNORMAL MG/DL
LEUKOCYTE ESTERASE, URINE: NEGATIVE
MUCUS: ABNORMAL HPF
NITRITE URINE, QUANTITATIVE: POSITIVE
OPIATES, URINE: NEGATIVE
OXYCODONE: NEGATIVE
PH, URINE: 5 (ref 5–8)
PROTEIN UA: 30 MG/DL
RBC URINE: 5 /HPF (ref 0–6)
SPECIFIC GRAVITY UA: >1.03 (ref 1–1.03)
SQUAMOUS EPITHELIAL: 2 /HPF
TRICHOMONAS: ABNORMAL /HPF
UROBILINOGEN, URINE: 1 MG/DL (ref 0.2–1)
WBC UA: 11 /HPF (ref 0–5)

## 2023-06-02 PROCEDURE — 80307 DRUG TEST PRSMV CHEM ANLYZR: CPT

## 2023-06-02 PROCEDURE — 81001 URINALYSIS AUTO W/SCOPE: CPT

## 2023-06-02 NOTE — ED NOTES
For EMTALA:    Accepted: [facility]    Provider: Dr. Schwab Loretta: [phone]    [facility] ETA: Neftali Flores, LSW  06/02/23 0965

## 2023-06-02 NOTE — ED NOTES
N2N report given and all questions answered to Aye Null at the [facility].      Ahmet Bruns RN  06/02/23 1010

## 2023-06-02 NOTE — ED NOTES
Pt awake, plan of care reviewed and all questions answered, breakfast provided.      Sol Diaz RN  06/02/23 7376
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
## 2023-06-02 NOTE — ED NOTES
Pt A/O, VS WNL, come and cooperative, transfer reviewed and all questions answered, pt transferred to [facility] at this time with [facility].      Maame Otto RN  06/02/23 3797

## 2023-06-02 NOTE — ED NOTES
Referral faxed to [facility] for consideration for placement.      FELICITY Wong  06/02/23 7923

## 2023-06-02 NOTE — ED PROVIDER NOTES
Patient handed off to me by colleague Dr. Bryson Infante pending mental health evaluation. Patient presented with psychosis and drug screen showed cocaine and cannabis. Patient was given Benadryl Ativan and Haldol during ED visit. Patient was pink slipped. Patient currently awaiting mental health evaluation and disposition. Patient evaluated by mental health provider and recommend admission to psychiatric facility. Patient has been accepted to [facility] under Dr. Awa Barrientos. ICD-10-CM    1. Mattie ([facility] [address]  F30.9       2.  Mental health problem  F48.9              Rajni De Jesus DO  06/02/23 1016
Patient is endorsed to me by Dr. Liban Garcia at [phone]. In short, patient presented with  acute psychosis and combative behavior . The patient was placed in suicide precautions, patient's clothing and belongings were removed, documented and stored in the emergency department. Patient was reported to me to be medically cleared and hemodynamically stable awaiting behavioral health evaluation. Currently awaiting input from behavioral health specialist for disposition. 0600:a.m.  I have signed out [facility] Emergency Department care to Dr. Gianluca Shankar. We discussed the pertinent history, physical exam, completed/pending test results (if applicable) and current treatment plan. Please refer to his/her chart for the patients remaining Emergency Department course and final disposition.        Gary Salazar MD  06/02/23 0916
No current facility-administered medications for this encounter. Current Outpatient Medications   Medication Sig Dispense Refill    dicyclomine (BENTYL) 20 MG tablet Take 1 tablet by mouth See Admin Instructions One tablet by mouth every 6-8 hours when necessary for abdominal pain or cramping. 20 tablet 0      No Known Allergies  No current facility-administered medications for this encounter. Current Outpatient Medications   Medication Sig Dispense Refill    dicyclomine (BENTYL) 20 MG tablet Take 1 tablet by mouth See Admin Instructions One tablet by mouth every 6-8 hours when necessary for abdominal pain or cramping. 20 tablet 0       Nursing Notes Reviewed    VITAL SIGNS:  ED Triage Vitals   Enc Vitals Group      BP       Pulse       Resp       Temp       Temp src       SpO2       Weight       Height       Head Circumference       Peak Flow       Pain Score       Pain Loc       Pain Edu? Excl. in [address]? PHYSICAL EXAM:  Physical Exam  Vitals and nursing note reviewed. Constitutional:       General: She is not in acute distress. Appearance: She is well-developed. She is not ill-appearing, toxic-appearing or diaphoretic. Neck:      Vascular: No JVD. Trachea: Phonation normal.   Abdominal:      General: There is no distension. Musculoskeletal:      Cervical back: Full passive range of motion without pain and normal range of motion. No rigidity. Neurological:      Mental Status: She is alert. Psychiatric:         Attention and Perception: She is inattentive. She perceives visual hallucinations. Mood and Affect: Mood is anxious and elated. Affect is blunt, angry and inappropriate. Speech: Speech is rapid and pressured and tangential.         Behavior: Behavior is uncooperative, agitated, aggressive, hyperactive and combative. Thought Content: Thought content is paranoid and delusional.         Judgment: Judgment is impulsive and inappropriate.          I

## 2023-06-02 NOTE — ED NOTES
Report received from [facility] and care assumed at this time.       Becki Ca RN  06/02/23 1405

## 2023-06-02 NOTE — ED NOTES
[facility] at the bedside to transport pt [facility]. All transfer paperwork complete and provided to [place] with pt belongings.      Carolina Chino RN  06/02/23 8300

## 2023-06-02 NOTE — ED NOTES
Care and report received from [employer] DARIN RAMAN pt resting comfortably eyes closed, sitter at the bedside, will continue to monitor.      Kristie Prescott RN  06/02/23 7525

## 2023-06-02 NOTE — ED NOTES
2300 Unable to be assessed right now due to pt being medicated  Pt unable to wake up to talk to anyone.       Mirta Castillo  06/01/23 8997

## 2023-08-25 ENCOUNTER — HOSPITAL ENCOUNTER (EMERGENCY)
Age: 28
Discharge: PSYCHIATRIC OR SUBSTANCE USE FACILITY | End: 2023-08-26
Attending: STUDENT IN AN ORGANIZED HEALTH CARE EDUCATION/TRAINING PROGRAM
Payer: MEDICAID

## 2023-08-25 VITALS
BODY MASS INDEX: 20.83 KG/M2 | RESPIRATION RATE: 20 BRPM | OXYGEN SATURATION: 100 % | DIASTOLIC BLOOD PRESSURE: 87 MMHG | WEIGHT: 125 LBS | HEIGHT: 65 IN | TEMPERATURE: 97.4 F | HEART RATE: 106 BPM | SYSTOLIC BLOOD PRESSURE: 108 MMHG

## 2023-08-25 DIAGNOSIS — F30.9 MANIA (HCC): ICD-10-CM

## 2023-08-25 DIAGNOSIS — Z71.1 MENTAL HEALTH-RELATED COMPLAINT: Primary | ICD-10-CM

## 2023-08-25 DIAGNOSIS — F23 ACUTE PSYCHOSIS (HCC): ICD-10-CM

## 2023-08-25 LAB
ACETAMINOPHEN LEVEL: <5 UG/ML (ref 15–30)
ALBUMIN SERPL-MCNC: 3.6 GM/DL (ref 3.4–5)
ALCOHOL SCREEN SERUM: <0.01 %WT/VOL
ALP BLD-CCNC: 53 IU/L (ref 40–129)
ALT SERPL-CCNC: 16 U/L (ref 10–40)
ANION GAP SERPL CALCULATED.3IONS-SCNC: 11 MMOL/L (ref 4–16)
AST SERPL-CCNC: 22 IU/L (ref 15–37)
BASOPHILS ABSOLUTE: 0 K/CU MM
BASOPHILS RELATIVE PERCENT: 0.5 % (ref 0–1)
BILIRUB SERPL-MCNC: 0.2 MG/DL (ref 0–1)
BUN SERPL-MCNC: 5 MG/DL (ref 6–23)
CALCIUM SERPL-MCNC: 8.5 MG/DL (ref 8.3–10.6)
CHLORIDE BLD-SCNC: 105 MMOL/L (ref 99–110)
CO2: 24 MMOL/L (ref 21–32)
CREAT SERPL-MCNC: 0.5 MG/DL (ref 0.6–1.1)
DIFFERENTIAL TYPE: ABNORMAL
DOSE AMOUNT: ABNORMAL
DOSE AMOUNT: ABNORMAL
DOSE TIME: ABNORMAL
DOSE TIME: ABNORMAL
EOSINOPHILS ABSOLUTE: 0.3 K/CU MM
EOSINOPHILS RELATIVE PERCENT: 3.5 % (ref 0–3)
GFR SERPL CREATININE-BSD FRML MDRD: >60 ML/MIN/1.73M2
GLUCOSE SERPL-MCNC: 98 MG/DL (ref 70–99)
HCG QUALITATIVE: NEGATIVE
HCT VFR BLD CALC: 37.2 % (ref 37–47)
HEMOGLOBIN: 12.1 GM/DL (ref 12.5–16)
IMMATURE NEUTROPHIL %: 0.4 % (ref 0–0.43)
LYMPHOCYTES ABSOLUTE: 2.2 K/CU MM
LYMPHOCYTES RELATIVE PERCENT: 28.1 % (ref 24–44)
MCH RBC QN AUTO: 29 PG (ref 27–31)
MCHC RBC AUTO-ENTMCNC: 32.5 % (ref 32–36)
MCV RBC AUTO: 89.2 FL (ref 78–100)
MONOCYTES ABSOLUTE: 1 K/CU MM
MONOCYTES RELATIVE PERCENT: 12.7 % (ref 0–4)
NUCLEATED RBC %: 0 %
PDW BLD-RTO: 13.2 % (ref 11.7–14.9)
PLATELET # BLD: 238 K/CU MM (ref 140–440)
PMV BLD AUTO: 10 FL (ref 7.5–11.1)
POTASSIUM SERPL-SCNC: 4 MMOL/L (ref 3.5–5.1)
RBC # BLD: 4.17 M/CU MM (ref 4.2–5.4)
SALICYLATE LEVEL: <0.3 MG/DL (ref 15–30)
SEGMENTED NEUTROPHILS ABSOLUTE COUNT: 4.3 K/CU MM
SEGMENTED NEUTROPHILS RELATIVE PERCENT: 54.8 % (ref 36–66)
SODIUM BLD-SCNC: 140 MMOL/L (ref 135–145)
TOTAL IMMATURE NEUTOROPHIL: 0.03 K/CU MM
TOTAL NUCLEATED RBC: 0 K/CU MM
TOTAL PROTEIN: 5.9 GM/DL (ref 6.4–8.2)
WBC # BLD: 7.8 K/CU MM (ref 4–10.5)

## 2023-08-25 PROCEDURE — G0480 DRUG TEST DEF 1-7 CLASSES: HCPCS

## 2023-08-25 PROCEDURE — 85025 COMPLETE CBC W/AUTO DIFF WBC: CPT

## 2023-08-25 PROCEDURE — 99285 EMERGENCY DEPT VISIT HI MDM: CPT

## 2023-08-25 PROCEDURE — 6360000002 HC RX W HCPCS

## 2023-08-25 PROCEDURE — 84703 CHORIONIC GONADOTROPIN ASSAY: CPT

## 2023-08-25 PROCEDURE — 80053 COMPREHEN METABOLIC PANEL: CPT

## 2023-08-25 PROCEDURE — 96372 THER/PROPH/DIAG INJ SC/IM: CPT

## 2023-08-25 RX ORDER — LORAZEPAM 2 MG/ML
2 INJECTION INTRAMUSCULAR ONCE
Status: COMPLETED | OUTPATIENT
Start: 2023-08-25 | End: 2023-08-25

## 2023-08-25 RX ORDER — LORAZEPAM 2 MG/ML
INJECTION INTRAMUSCULAR
Status: COMPLETED
Start: 2023-08-25 | End: 2023-08-25

## 2023-08-25 RX ORDER — HALOPERIDOL 5 MG/ML
INJECTION INTRAMUSCULAR
Status: COMPLETED
Start: 2023-08-25 | End: 2023-08-25

## 2023-08-25 RX ORDER — HALOPERIDOL 5 MG/ML
5 INJECTION INTRAMUSCULAR ONCE
Status: COMPLETED | OUTPATIENT
Start: 2023-08-25 | End: 2023-08-25

## 2023-08-25 RX ADMIN — LORAZEPAM 2 MG: 2 INJECTION INTRAMUSCULAR at 12:21

## 2023-08-25 RX ADMIN — HALOPERIDOL 5 MG: 5 INJECTION INTRAMUSCULAR at 12:20

## 2023-08-25 RX ADMIN — LORAZEPAM 2 MG: 2 INJECTION INTRAMUSCULAR; INTRAVENOUS at 12:21

## 2023-08-25 RX ADMIN — HALOPERIDOL LACTATE 5 MG: 5 INJECTION, SOLUTION INTRAMUSCULAR at 12:20

## 2023-08-25 ASSESSMENT — PAIN - FUNCTIONAL ASSESSMENT: PAIN_FUNCTIONAL_ASSESSMENT: NONE - DENIES PAIN
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
## 2023-08-25 NOTE — ED NOTES
Patient given calming meds and is currently unable to be assessed. Will need to page Dr. Elvin Montoya for assessment when ready.      Alysha Weeks, FELICITY  08/25/23 0473
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
## 2023-08-25 NOTE — ED NOTES
Pt rambling on and unwilling to talk to providers. Pt willing to take medications at this time to help calm her down. Pt continuing to ramble at this time. Waiting for sitter to get to bedside.       Radha Allen RN  08/25/23 6786

## 2023-08-26 LAB
AMPHETAMINES: ABNORMAL
BARBITURATE SCREEN URINE: NEGATIVE
BENZODIAZEPINE SCREEN, URINE: NEGATIVE
CANNABINOID SCREEN URINE: NEGATIVE
COCAINE METABOLITE: ABNORMAL
FENTANYL URINE: NEGATIVE
INTERPRETATION: NORMAL
OPIATES, URINE: NEGATIVE
OXYCODONE: NEGATIVE
PREGNANCY, URINE: NEGATIVE

## 2023-08-26 PROCEDURE — 80307 DRUG TEST PRSMV CHEM ANLYZR: CPT

## 2023-08-26 PROCEDURE — 81025 URINE PREGNANCY TEST: CPT

## 2023-08-26 NOTE — ED NOTES
Patient given food and drink as requested. Patient sat on the edge of the bed and urinated. Patient bed linen and gown changed at this time.       Cydney Kenyon RN  08/26/23 5771

## 2023-08-26 NOTE — ED NOTES
Pt refusing set of vital signs at this time.  Pt still yelling at staff & states that she wasn't running in & out of traffic that she was jogging & \"they are lying & don't know what they are talking about\"     Amanda Moffett RN  08/26/23 2779

## 2023-08-26 NOTE — ED NOTES
Pt amb to bathroom with sitter. Pt refusing to give urine sample at this time. Pt cussing at staff states \"someone better get me the fuck back to [place]. \"      Nikolai Franklin RN  08/26/23 5549

## 2023-08-26 NOTE — ED PROVIDER NOTES
Patient is endorsed to me by Dr. Catie Mathis at [phone]. In short, patient presented with  acute psychosis . The patient was placed in suicide precautions, patient's clothing and belongings were removed, documented and stored in the emergency department. Patient was reported to me to be medically cleared and hemodynamically stable awaiting behavioral health evaluation. Currently awaiting input from behavioral health specialist for disposition. 0600: I have signed out [facility] Emergency Department care to Dr. Mary Stout. We discussed the pertinent history, physical exam, completed/pending test results (if applicable) and current treatment plan. Please refer to his/her chart for the patients remaining Emergency Department course and final disposition.        Elsa Plata MD  08/26/23 5187

## 2023-08-26 NOTE — ED PROVIDER NOTES
Emergency Department Encounter  Location: [facility] EMERGENCY DEPARTMENT    Patient: Tasha Rhodes  MRN: [id number]  : 1995  Date of evaluation: 2023  ED Provider: Belen Enciso DO    8:28 PM EDT  Tasha Rhodes was checked out to me by   meri . Please see his/her initial documentation for details of the patient's initial ED presentation, physical exam and completed studies. In brief, Tasha Rhodes is a 33 y.o. female that presented to the emergency department for suicidal ideation. The patient was placed in precautions, patient's clothing and belongings were removed, documented and stored in the emergency department. Patient was reported to me to be medically stable and hemodynamically stable awaiting behavioral health evaluation. Currently awaiting input from behavioral health specialist for disposition.     I have reviewed and interpreted all of the currently available lab results and diagnostics from this visit:  Results for orders placed or performed during the hospital encounter of 23   CBC with Auto Differential   Result Value Ref Range    WBC 7.8 4.0 - 10.5 K/CU MM    RBC 4.17 (L) 4.2 - 5.4 M/CU MM    Hemoglobin 12.1 (L) 12.5 - 16.0 GM/DL    Hematocrit 37.2 37 - 47 %    MCV 89.2 78 - 100 FL    MCH 29.0 27 - 31 PG    MCHC 32.5 32.0 - 36.0 %    RDW 13.2 11.7 - 14.9 %    Platelets 245 718 - 750 K/CU MM    MPV 10.0 7.5 - 11.1 FL    Differential Type AUTOMATED DIFFERENTIAL     Segs Relative 54.8 36 - 66 %    Lymphocytes % 28.1 24 - 44 %    Monocytes % 12.7 (H) 0 - 4 %    Eosinophils % 3.5 (H) 0 - 3 %    Basophils % 0.5 0 - 1 %    Segs Absolute 4.3 K/CU MM    Lymphocytes Absolute 2.2 K/CU MM    Monocytes Absolute 1.0 K/CU MM    Eosinophils Absolute 0.3 K/CU MM    Basophils Absolute 0.0 K/CU MM    Nucleated RBC % 0.0 %    Total Nucleated RBC 0.0 K/CU MM    Total Immature Neutrophil 0.03 K/CU MM    Immature Neutrophil % 0.4 0 - 0.43 signed out to Dr. Ronal Park am the primary physician of record    Clinical Impression:  1. Mental health-related complaint      Disposition referral (if applicable):  No follow-up provider specified. Disposition medications (if applicable):  New Prescriptions    No medications on file       I am the Primary Clinician of Record. Comment: Please note this report has been produced using speech recognition software and may contain errors related to that system including errors in grammar, punctuation, and spelling, as well as words and phrases that may be inappropriate. If there are any questions or concerns please feel free to contact the dictating provider for clarification.        Shanell Gilliam,   Resident  08/27/23 2980 [facility], DO  Resident  08/27/23 6756

## 2023-08-26 NOTE — ED NOTES
[facility] states no availability. Referral faxed to [facility] and Maldonado Tate.      Rocio Simons, FELICITY  08/26/23 3467
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
## 2023-08-26 NOTE — ED NOTES
EMTALA:       Accepted: [facility]      Provider: Dr. Elbert Amezquita: [phone]      SUPERIOR: [address] [place] [address],[address] [place], [place]  08/26/23 8078

## 2023-08-26 NOTE — ED NOTES
This nurse advised patient several times not to touch the IPAD during mental health evaluation. Patient was uncooperative, loud, & cussing at Dr. Abhilash Jara.      Susana Dawson RN  08/26/23 0498

## 2023-08-26 NOTE — ED NOTES
Report received from Rosa Kapoor, [address]. Assumed care @ this time.       Vicki Sequeira RN  08/26/23 0684

## 2023-10-29 ENCOUNTER — APPOINTMENT (OUTPATIENT)
Dept: GENERAL RADIOLOGY | Age: 28
End: 2023-10-29
Payer: MEDICAID

## 2023-10-29 ENCOUNTER — HOSPITAL ENCOUNTER (EMERGENCY)
Age: 28
Discharge: HOME | End: 2023-10-29
Attending: EMERGENCY MEDICINE
Payer: MEDICAID

## 2023-10-29 VITALS
RESPIRATION RATE: 18 BRPM | SYSTOLIC BLOOD PRESSURE: 102 MMHG | OXYGEN SATURATION: 98 % | TEMPERATURE: 98.3 F | HEART RATE: 97 BPM | BODY MASS INDEX: 19.63 KG/M2 | HEIGHT: 64 IN | DIASTOLIC BLOOD PRESSURE: 59 MMHG | WEIGHT: 115 LBS

## 2023-10-29 DIAGNOSIS — F23 ACUTE PSYCHOSIS (HCC): Primary | ICD-10-CM

## 2023-10-29 DIAGNOSIS — N30.01 ACUTE CYSTITIS WITH HEMATURIA: ICD-10-CM

## 2023-10-29 LAB
ACETAMINOPHEN LEVEL: <5 UG/ML (ref 15–30)
ALBUMIN SERPL-MCNC: 5 GM/DL (ref 3.4–5)
ALCOHOL SCREEN SERUM: <0.01 %WT/VOL
ALP BLD-CCNC: 66 IU/L (ref 40–129)
ALT SERPL-CCNC: 15 U/L (ref 10–40)
AMPHETAMINES: ABNORMAL
ANION GAP SERPL CALCULATED.3IONS-SCNC: 17 MMOL/L (ref 4–16)
AST SERPL-CCNC: 35 IU/L (ref 15–37)
BACTERIA: NEGATIVE /HPF
BARBITURATE SCREEN URINE: NEGATIVE
BENZODIAZEPINE SCREEN, URINE: NEGATIVE
BILIRUB SERPL-MCNC: 0.9 MG/DL (ref 0–1)
BILIRUBIN URINE: NEGATIVE MG/DL
BLOOD, URINE: ABNORMAL
BUN SERPL-MCNC: 21 MG/DL (ref 6–23)
CALCIUM SERPL-MCNC: 10 MG/DL (ref 8.3–10.6)
CANNABINOID SCREEN URINE: NEGATIVE
CHLORIDE BLD-SCNC: 96 MMOL/L (ref 99–110)
CLARITY: CLEAR
CO2: 25 MMOL/L (ref 21–32)
COCAINE METABOLITE: ABNORMAL
COLOR: YELLOW
CREAT SERPL-MCNC: 0.8 MG/DL (ref 0.6–1.1)
DOSE AMOUNT: ABNORMAL
DOSE AMOUNT: ABNORMAL
DOSE TIME: ABNORMAL
DOSE TIME: ABNORMAL
FENTANYL URINE: NEGATIVE
GFR SERPL CREATININE-BSD FRML MDRD: >60 ML/MIN/1.73M2
GLUCOSE SERPL-MCNC: 97 MG/DL (ref 70–99)
GLUCOSE, URINE: NEGATIVE MG/DL
HCT VFR BLD CALC: 42 % (ref 37–47)
HEMOGLOBIN: 13.3 GM/DL (ref 12.5–16)
KETONES, URINE: 15 MG/DL
LEUKOCYTE ESTERASE, URINE: NEGATIVE
MCH RBC QN AUTO: 28.7 PG (ref 27–31)
MCHC RBC AUTO-ENTMCNC: 31.7 % (ref 32–36)
MCV RBC AUTO: 90.7 FL (ref 78–100)
MUCUS: ABNORMAL HPF
NITRITE URINE, QUANTITATIVE: POSITIVE
OPIATES, URINE: NEGATIVE
OXYCODONE: NEGATIVE
PDW BLD-RTO: 13.2 % (ref 11.7–14.9)
PH, URINE: 5.5 (ref 5–8)
PLATELET # BLD: 284 K/CU MM (ref 140–440)
PMV BLD AUTO: 10.9 FL (ref 7.5–11.1)
POTASSIUM SERPL-SCNC: 3.7 MMOL/L (ref 3.5–5.1)
PROTEIN UA: ABNORMAL MG/DL
RBC # BLD: 4.63 M/CU MM (ref 4.2–5.4)
RBC URINE: 1 /HPF (ref 0–6)
SALICYLATE LEVEL: <0.3 MG/DL (ref 15–30)
SARS-COV-2 RDRP RESP QL NAA+PROBE: NOT DETECTED
SODIUM BLD-SCNC: 138 MMOL/L (ref 135–145)
SOURCE: NORMAL
SPECIFIC GRAVITY UA: >1.03 (ref 1–1.03)
SQUAMOUS EPITHELIAL: 2 /HPF
TOTAL PROTEIN: 8.8 GM/DL (ref 6.4–8.2)
TRICHOMONAS: ABNORMAL /HPF
UROBILINOGEN, URINE: 0.2 MG/DL (ref 0.2–1)
WBC # BLD: 20.8 K/CU MM (ref 4–10.5)
WBC UA: 2 /HPF (ref 0–5)

## 2023-10-29 PROCEDURE — 71045 X-RAY EXAM CHEST 1 VIEW: CPT

## 2023-10-29 PROCEDURE — 80307 DRUG TEST PRSMV CHEM ANLYZR: CPT

## 2023-10-29 PROCEDURE — 99284 EMERGENCY DEPT VISIT MOD MDM: CPT

## 2023-10-29 PROCEDURE — G0480 DRUG TEST DEF 1-7 CLASSES: HCPCS

## 2023-10-29 PROCEDURE — 87635 SARS-COV-2 COVID-19 AMP PRB: CPT

## 2023-10-29 PROCEDURE — 6360000002 HC RX W HCPCS: Performed by: EMERGENCY MEDICINE

## 2023-10-29 PROCEDURE — 6370000000 HC RX 637 (ALT 250 FOR IP): Performed by: STUDENT IN AN ORGANIZED HEALTH CARE EDUCATION/TRAINING PROGRAM

## 2023-10-29 PROCEDURE — 80053 COMPREHEN METABOLIC PANEL: CPT

## 2023-10-29 PROCEDURE — 85027 COMPLETE CBC AUTOMATED: CPT

## 2023-10-29 PROCEDURE — 81001 URINALYSIS AUTO W/SCOPE: CPT

## 2023-10-29 PROCEDURE — 96372 THER/PROPH/DIAG INJ SC/IM: CPT

## 2023-10-29 RX ORDER — CEPHALEXIN 500 MG/1
500 CAPSULE ORAL 4 TIMES DAILY
Qty: 14 CAPSULE | Refills: 0 | Status: SHIPPED | OUTPATIENT
Start: 2023-10-29 | End: 2023-11-02

## 2023-10-29 RX ORDER — CEPHALEXIN 250 MG/1
500 CAPSULE ORAL 3 TIMES DAILY
Status: DISCONTINUED | OUTPATIENT
Start: 2023-10-29 | End: 2023-10-29 | Stop reason: HOSPADM

## 2023-10-29 RX ORDER — DIPHENHYDRAMINE HYDROCHLORIDE 50 MG/ML
25 INJECTION INTRAMUSCULAR; INTRAVENOUS ONCE
Status: COMPLETED | OUTPATIENT
Start: 2023-10-29 | End: 2023-10-29

## 2023-10-29 RX ORDER — HALOPERIDOL 5 MG/ML
5 INJECTION INTRAMUSCULAR ONCE
Status: COMPLETED | OUTPATIENT
Start: 2023-10-29 | End: 2023-10-29

## 2023-10-29 RX ORDER — LORAZEPAM 2 MG/ML
2 INJECTION INTRAMUSCULAR ONCE
Status: COMPLETED | OUTPATIENT
Start: 2023-10-29 | End: 2023-10-29

## 2023-10-29 RX ADMIN — CEPHALEXIN 500 MG: 250 CAPSULE ORAL at 13:16

## 2023-10-29 RX ADMIN — LORAZEPAM 2 MG: 2 INJECTION INTRAMUSCULAR; INTRAVENOUS at 05:25

## 2023-10-29 RX ADMIN — DIPHENHYDRAMINE HYDROCHLORIDE 25 MG: 50 INJECTION, SOLUTION INTRAMUSCULAR; INTRAVENOUS at 05:25

## 2023-10-29 RX ADMIN — HALOPERIDOL LACTATE 5 MG: 5 INJECTION, SOLUTION INTRAMUSCULAR at 05:25

## 2023-10-29 ASSESSMENT — LIFESTYLE VARIABLES
HOW OFTEN DO YOU HAVE A DRINK CONTAINING ALCOHOL: PATIENT DECLINED
HOW MANY STANDARD DRINKS CONTAINING ALCOHOL DO YOU HAVE ON A TYPICAL DAY: PATIENT DECLINED

## 2023-10-29 ASSESSMENT — PAIN - FUNCTIONAL ASSESSMENT: PAIN_FUNCTIONAL_ASSESSMENT: NONE - DENIES PAIN

## 2023-10-29 ASSESSMENT — PAIN SCALES - GENERAL: PAINLEVEL_OUTOF10: 0

## 2023-10-29 NOTE — ED NOTES
Security in door way, pt changed into green gown all belongings placed in belongings bag and give to [employer].         Lonnie Velez RN  10/29/23 7117

## 2023-10-29 NOTE — ED NOTES
Pt denying any thoughts of SI or Homicide, stating remembers everything and why she is at the ED, denying any pain or discomfort, safety meal provided, UA and covid test obtained, sitter at the bedside, will continue to monitor.      Tisha Ness RN  10/29/23 4612

## 2023-10-29 NOTE — ED NOTES
Code SOS called pt not coroperating with staff, speaking in word esema, ramkeanu.       Angela Kuhn RN  10/29/23 0773
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
## 2023-10-29 NOTE — ED NOTES
Pt d/c reviewed and all questions answered, printed prescription with d/c paperwork provided, security provided pt belongings, pt d/c home stable.      Janette Bradley RN  10/29/23 6097

## 2023-10-29 NOTE — ACP (ADVANCE CARE PLANNING)
Patient does not have any ACP documents/Medical Power of . LSW notes hospital will follow [place]'s Next of Kin hierarchy in the following descending order for priority:    Guardian  Spouse  Majority of adult Children  Parents  Majority of adult Siblings  Nearest Relative not described above    Per [place] Next of Kin hierarchy: Patients' parent will be [address]

## 2023-10-29 NOTE — ED NOTES
Able to obtain pts name and SS# [id number]. Pt name is [place].       Deana Postal, MOLINA  10/29/23 1361

## 2023-10-29 NOTE — DISCHARGE INSTRUCTIONS
100 [facility], [address] [place] [address]   [phone]   www. [facility]. [place].    Provides after-school and prevention activities at [facility] for children ages 11 to 16. Nicky Wilson, [address] [place] [address]   [phone]   www.[facility]. [facility] [place]    Provides outpatient mental health counseling services for children and adults. For additional information and referrals, dial 2-1-1. Residents can obtain information at 2-1-1 about hospitals, doctors, the nearest food pantry, utility services, or a variety of other types of resources. The number is available for non-emergency assistance 24 hours a day. Alternate Numbers:    [facility]:  [phone]    [facility]:  [phone]    [facility]:  [phone]    Toll-Free:  [phone]                                                 Primary Care Physicians    [facility]    Dr. Shamar Car. Albin HOLT  [facility]  501 [facility], [address] [place] [address]  1959 [facility] 2434 [facility] Internal [facility]  [address], [address] [place] [address]  [phone]    [place]    Casie Robertson MD  [facility] [address], [address] [place] [address]  [address] and Jessica. John Sumner MD  [facility]. [facility] [id number] [phone]    [facility]. CHUY DURBIN [facility] CNP  11[address] [facility]  2667 [facility] CNP   1114 [facility]  [phone]    Dottie Davison MD  1114 [facility]. [address]  [phone]     Gale Su MD  [facility]. [address]  [phone]    Elsy Ochoa PA-C  1114 [facility].   [address]  [phone]    Primary Care Providers Lacymostewart Hill MD  [facility]  [phone]    Dr. Luke Etienne MD   [facility]  [phone]    Primary Care

## 2023-10-29 NOTE — ED PROVIDER NOTES
ED Course as of 10/29/23 1517   Sun Oct 29, 3631   8785 PS by police for standing on someone's driveway, bizarre behavior   Needed B52 - better afterwards  Pend: UA, CXR, covid, psych [AR]   0911 XR CHEST PORTABLE  IMPRESSION:  No acute process. [AR]   1215 Nitrite Urine, Quantitative(!): POSITIVE [AR]   1215 Leukocyte Esterase, Urine: NEGATIVE [AR]   1215 RBC, UA: 1 [AR]   3697 Patient started on keflex [AR]   0312 Amphetamines(!): UNCONFIRMED POSITIVE [AR]   1255 Cocaine Metabolite(!): UNCONFIRMED POSITIVE [AR]   8715 SARS-CoV-2, NAAT: NOT DETECTED [AR]   4208 According to Fito Rodriguez, patient was discussed with psychiatry, Dr. Pati Ruelas, who is recommending discharge with outpatient follow-up. During my evaluation patient had no tenderness to palpation over her abdomen and no CVA tenderness. However antibiotics will be given for concern of UTI due to positive nitrates. [AR]      ED Course User Index  [AR] Nicole Goodman MD        Dispo: Discharged with mental health resources, Keflex, PCP follow-up.     Patient was discharged in stable condition     Nicole Goodman MD  10/29/23 4912
>60 >60 mL/min/1.73m2    Calcium 10.0 8.3 - 10.6 MG/DL    Total Protein 8.8 (H) 6.4 - 8.2 GM/DL    Albumin 5.0 3.4 - 5.0 GM/DL    Total Bilirubin 0.9 0.0 - 1.0 MG/DL    Alkaline Phosphatase 66 40 - 129 IU/L    ALT 15 10 - 40 U/L    AST 35 15 - 37 IU/L   Ethanol   Result Value Ref Range    Alcohol Scrn <0.01 <0.01 %WT/VOL   Acetaminophen Level   Result Value Ref Range    Acetaminophen Level <5.0 (L) 15 - 30 ug/ml    DOSE AMOUNT DOSE AMT. GIVEN - UNKNOWN     DOSE TIME DOSE TIME GIVEN - UNKNOWN    Salicylate   Result Value Ref Range    Salicylate Lvl <9.9 (L) 15 - 30 MG/DL    DOSE AMOUNT DOSE AMT. GIVEN - UNKNOWN     DOSE TIME DOSE TIME GIVEN - UNKNOWN       Radiographs (if obtained):  Radiologist's Report Reviewed:  No results found. EKG (if obtained): (All EKG's are interpreted by myself in the absence of a cardiologist)    Medications   LORazepam (ATIVAN) injection 2 mg (2 mg IntraMUSCular Given 10/29/23 0525)   haloperidol lactate (HALDOL) injection 5 mg (5 mg IntraMUSCular Given 10/29/23 0525)   diphenhydrAMINE (BENADRYL) injection 25 mg (25 mg IntraMUSCular Given 10/29/23 0525)       MDM:  Cristofer Johnston is a 33 y.o. female with history of psychosis, drug use, moo, mood disorder stimulant use presents to the emergency department pink slipped by police for bizarre behavior and psychosis. Patient out in the street with no shoes on and urinated on self and pulling her pants down. Patient agitated upon arrival to the emergency department refused to give any history of present illness. On exam patient manic, acutely psychotic pressured speech disheveled smells of urine wet wet feet no shoes, agitated and uncooperative    Patient pink slipped by police. Patient ordered laboratory studies. Patient had to be ordered Benadryl 25 mg IM, Ativan 2 mg IM, Haldol 5 mg IM. These medication patient was more pleasant she was able to give her name and her still security number.   Laboratory studies were ordered

## 2024-01-30 ENCOUNTER — HOSPITAL ENCOUNTER (EMERGENCY)
Age: 29
Discharge: HOME | End: 2024-01-30
Payer: MEDICAID

## 2024-01-30 VITALS
SYSTOLIC BLOOD PRESSURE: 128 MMHG | TEMPERATURE: 98.2 F | OXYGEN SATURATION: 98 % | DIASTOLIC BLOOD PRESSURE: 71 MMHG | HEART RATE: 96 BPM | RESPIRATION RATE: 16 BRPM

## 2024-01-30 DIAGNOSIS — S80.812A ABRASION OF LEFT LOWER EXTREMITY, INITIAL ENCOUNTER: Primary | ICD-10-CM

## 2024-01-30 DIAGNOSIS — F19.10 POLYSUBSTANCE ABUSE (HCC): ICD-10-CM

## 2024-01-30 DIAGNOSIS — F19.920 DRUG INTOXICATION WITHOUT COMPLICATION (HCC): ICD-10-CM

## 2024-01-30 PROCEDURE — 99282 EMERGENCY DEPT VISIT SF MDM: CPT
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
## 2024-05-11 ENCOUNTER — HOSPITAL ENCOUNTER (EMERGENCY)
Age: 29
Discharge: PSYCHIATRIC OR SUBSTANCE USE FACILITY | End: 2024-05-12
Attending: STUDENT IN AN ORGANIZED HEALTH CARE EDUCATION/TRAINING PROGRAM
Payer: MEDICAID

## 2024-05-11 DIAGNOSIS — F15.10 METHAMPHETAMINE ABUSE (HCC): ICD-10-CM

## 2024-05-11 DIAGNOSIS — F29 PSYCHOSIS, UNSPECIFIED PSYCHOSIS TYPE (HCC): ICD-10-CM

## 2024-05-11 DIAGNOSIS — F19.10 POLYSUBSTANCE ABUSE (HCC): Primary | ICD-10-CM

## 2024-05-11 LAB
ACETAMINOPHEN LEVEL: <5 UG/ML (ref 15–30)
ALBUMIN SERPL-MCNC: 4.9 GM/DL (ref 3.4–5)
ALCOHOL SCREEN SERUM: <0.01 %WT/VOL
ALP BLD-CCNC: 56 IU/L (ref 40–128)
ALT SERPL-CCNC: 11 U/L (ref 10–40)
AMPHETAMINES: ABNORMAL
ANION GAP SERPL CALCULATED.3IONS-SCNC: 13 MMOL/L (ref 7–16)
AST SERPL-CCNC: 18 IU/L (ref 15–37)
BARBITURATE SCREEN URINE: NEGATIVE
BASOPHILS ABSOLUTE: 0.1 K/CU MM
BASOPHILS RELATIVE PERCENT: 0.6 % (ref 0–1)
BENZODIAZEPINE SCREEN, URINE: NEGATIVE
BILIRUB SERPL-MCNC: 1 MG/DL (ref 0–1)
BUN SERPL-MCNC: 12 MG/DL (ref 6–23)
CALCIUM SERPL-MCNC: 9 MG/DL (ref 8.3–10.6)
CANNABINOID SCREEN URINE: ABNORMAL
CHLORIDE BLD-SCNC: 98 MMOL/L (ref 99–110)
CO2: 25 MMOL/L (ref 21–32)
COCAINE METABOLITE: NEGATIVE
CREAT SERPL-MCNC: 0.7 MG/DL (ref 0.6–1.1)
DIFFERENTIAL TYPE: ABNORMAL
DOSE AMOUNT: ABNORMAL
DOSE AMOUNT: ABNORMAL
DOSE TIME: ABNORMAL
DOSE TIME: ABNORMAL
EKG ATRIAL RATE: 80 BPM
EKG DIAGNOSIS: NORMAL
EKG P AXIS: 62 DEGREES
EKG P-R INTERVAL: 146 MS
EKG Q-T INTERVAL: 434 MS
EKG QRS DURATION: 84 MS
EKG QTC CALCULATION (BAZETT): 500 MS
EKG R AXIS: 68 DEGREES
EKG T AXIS: 56 DEGREES
EKG VENTRICULAR RATE: 80 BPM
EOSINOPHILS ABSOLUTE: 0.2 K/CU MM
EOSINOPHILS RELATIVE PERCENT: 1.6 % (ref 0–3)
FENTANYL URINE: NEGATIVE
GFR, ESTIMATED: >90 ML/MIN/1.73M2
GLUCOSE SERPL-MCNC: 88 MG/DL (ref 70–99)
HCT VFR BLD CALC: 39.5 % (ref 37–47)
HEMOGLOBIN: 13.4 GM/DL (ref 12.5–16)
IMMATURE NEUTROPHIL %: 0.2 % (ref 0–0.43)
INTERPRETATION: NORMAL
LYMPHOCYTES ABSOLUTE: 3.1 K/CU MM
LYMPHOCYTES RELATIVE PERCENT: 25.7 % (ref 24–44)
MAGNESIUM: 2.1 MG/DL (ref 1.8–2.4)
MCH RBC QN AUTO: 29.8 PG (ref 27–31)
MCHC RBC AUTO-ENTMCNC: 33.9 % (ref 32–36)
MCV RBC AUTO: 87.8 FL (ref 78–100)
MONOCYTES ABSOLUTE: 1.2 K/CU MM
MONOCYTES RELATIVE PERCENT: 10 % (ref 0–4)
NEUTROPHILS ABSOLUTE: 7.4 K/CU MM
NEUTROPHILS RELATIVE PERCENT: 61.9 % (ref 36–66)
NUCLEATED RBC %: 0 %
OPIATES, URINE: NEGATIVE
OXYCODONE: NEGATIVE
PDW BLD-RTO: 12.7 % (ref 11.7–14.9)
PLATELET # BLD: 286 K/CU MM (ref 140–440)
PMV BLD AUTO: 10.1 FL (ref 7.5–11.1)
POTASSIUM SERPL-SCNC: 3.6 MMOL/L (ref 3.5–5.1)
PREGNANCY, URINE: NEGATIVE
RBC # BLD: 4.5 M/CU MM (ref 4.2–5.4)
SALICYLATE LEVEL: <0.3 MG/DL (ref 15–30)
SODIUM BLD-SCNC: 136 MMOL/L (ref 135–145)
TOTAL CK: 171 IU/L (ref 26–140)
TOTAL IMMATURE NEUTOROPHIL: 0.02 K/CU MM
TOTAL NUCLEATED RBC: 0 K/CU MM
TOTAL PROTEIN: 8.2 GM/DL (ref 6.4–8.2)
WBC # BLD: 11.9 K/CU MM (ref 4–10.5)

## 2024-05-11 PROCEDURE — 96361 HYDRATE IV INFUSION ADD-ON: CPT

## 2024-05-11 PROCEDURE — 83735 ASSAY OF MAGNESIUM: CPT

## 2024-05-11 PROCEDURE — 93010 ELECTROCARDIOGRAM REPORT: CPT | Performed by: INTERNAL MEDICINE

## 2024-05-11 PROCEDURE — 80053 COMPREHEN METABOLIC PANEL: CPT

## 2024-05-11 PROCEDURE — 85025 COMPLETE CBC W/AUTO DIFF WBC: CPT

## 2024-05-11 PROCEDURE — 90792 PSYCH DIAG EVAL W/MED SRVCS: CPT

## 2024-05-11 PROCEDURE — 99285 EMERGENCY DEPT VISIT HI MDM: CPT

## 2024-05-11 PROCEDURE — 82550 ASSAY OF CK (CPK): CPT

## 2024-05-11 PROCEDURE — G0480 DRUG TEST DEF 1-7 CLASSES: HCPCS

## 2024-05-11 PROCEDURE — 96372 THER/PROPH/DIAG INJ SC/IM: CPT

## 2024-05-11 PROCEDURE — 81025 URINE PREGNANCY TEST: CPT

## 2024-05-11 PROCEDURE — 96360 HYDRATION IV INFUSION INIT: CPT

## 2024-05-11 PROCEDURE — 93005 ELECTROCARDIOGRAM TRACING: CPT | Performed by: STUDENT IN AN ORGANIZED HEALTH CARE EDUCATION/TRAINING PROGRAM

## 2024-05-11 PROCEDURE — 6360000002 HC RX W HCPCS: Performed by: STUDENT IN AN ORGANIZED HEALTH CARE EDUCATION/TRAINING PROGRAM

## 2024-05-11 PROCEDURE — 2580000003 HC RX 258: Performed by: EMERGENCY MEDICINE

## 2024-05-11 PROCEDURE — 80307 DRUG TEST PRSMV CHEM ANLYZR: CPT

## 2024-05-11 RX ORDER — DIPHENHYDRAMINE HYDROCHLORIDE 50 MG/ML
50 INJECTION INTRAMUSCULAR; INTRAVENOUS ONCE
Status: COMPLETED | OUTPATIENT
Start: 2024-05-11 | End: 2024-05-11

## 2024-05-11 RX ORDER — 0.9 % SODIUM CHLORIDE 0.9 %
1000 INTRAVENOUS SOLUTION INTRAVENOUS ONCE
Status: COMPLETED | OUTPATIENT
Start: 2024-05-11 | End: 2024-05-11

## 2024-05-11 RX ORDER — LORAZEPAM 2 MG/ML
2 INJECTION INTRAMUSCULAR ONCE
Status: COMPLETED | OUTPATIENT
Start: 2024-05-11 | End: 2024-05-11

## 2024-05-11 RX ORDER — HALOPERIDOL 5 MG/ML
5 INJECTION INTRAMUSCULAR ONCE
Status: COMPLETED | OUTPATIENT
Start: 2024-05-11 | End: 2024-05-11

## 2024-05-11 RX ADMIN — HALOPERIDOL LACTATE 5 MG: 5 INJECTION, SOLUTION INTRAMUSCULAR at 11:19

## 2024-05-11 RX ADMIN — SODIUM CHLORIDE 1000 ML: 9 INJECTION, SOLUTION INTRAVENOUS at 19:22

## 2024-05-11 RX ADMIN — DIPHENHYDRAMINE HYDROCHLORIDE 50 MG: 50 INJECTION, SOLUTION INTRAMUSCULAR; INTRAVENOUS at 11:18

## 2024-05-11 RX ADMIN — LORAZEPAM 2 MG: 2 INJECTION INTRAMUSCULAR; INTRAVENOUS at 11:06

## 2024-05-11 ASSESSMENT — PAIN SCALES - GENERAL: PAINLEVEL_OUTOF10: 0

## 2024-05-11 NOTE — DISCHARGE INSTR - LAB
Substance Abuse Resources    [facility]   [phone] or [phone]   [address]  Intensive Outpatient and Outpatient treatment for both men & women    [facility]   [phone] [address] [facility]   Intensive outpatient and residential for men & Intensive outpatient for women     [facility]      [phone]                      [address] [facility]  Comprehensive Outpatient Addiction Treatment    Outpatient treatment for both men & women:   Call for insurance eligibility vs cost  [facility] [phone]  [facility] [phone]  [facility] [phone]  [facility] [phone]    Drug and Alcohol Treatment Facilities:   Call for insurance eligibility vs cost  [facility] [phone]  [facility] [phone]  [facility] [phone]  [facility] [phone]  [facility] [phone]  [facility] [phone]  [place] [facility] [phone]  [facility] [phone]  [facility] [phone]  [facility] [phone]  [facility] [phone]    Alcoholics Anonymous/ GAUDENCIO/ALATEEN [phone] or [phone]  https://cogf.meetingfor.me/meetings or www.aacentral[place].org      Narcotics Anonymous [phone] or [phone]   http://sascna.org/ or www.nacentral[place].org  Cocaine Anonymous [phone] www.caohio.org   Marijuana Anonymous [phone] www.marijuana-anonymous.org     Mental Health Crisis Line: [phone]  [facility]: [phone]  [place] Quit Line: (smoking) https://[place].quitlogix.org 800-QUIT-NOW (730-305-0682)  24/7 crisis line for [facility]: [phone]  24-hour crisis text hotline: Text the word \"4hope\" to 078-433 for crisis support    Information & Referral for [facility]  Referral line to connect with available resources/services  [facility] [phone] or [facility]

## 2024-05-11 NOTE — DISCHARGE INSTRUCTIONS
Call and schedule follow-up appoint with your primary care provider.  Return to emergency department if you develop new or worsening symptoms.

## 2024-05-11 NOTE — ED PROVIDER NOTES
EMERGENCY DEPARTMENT HISTORY AND PHYSICAL EXAM      Patient Name: Marilynn Mccullough  MRN: [id number]  : 1995  Date of Evaluation: 2024  ED Provider:  Nolberto Philip DO    History of Presenting Illness     Chief Complaint:   Chief Complaint   Patient presents with    Addiction Problem     Pt to ED for AMS and combative behavior. Hx of meth use.       HPI: Patient is a 28 y.o. female with history of polysubstance abuse and underlying psychiatric disorder presenting to the emergency department via police after a civilian call for patient acting abnormally in the street.  Patient was reportedly screaming and acting bizarre in public dangerously walking into the street on and off the sidewalk.  When police arrived she began to take off her clothing and running around.  Police state that patient will intermittently shout insults at them but otherwise is acting erratically.  Unable to obtain any reliable history from patient at this point time.        Past History     Past Medical History: History reviewed. No pertinent past medical history.  Surgical History: History reviewed. No pertinent surgical history.  Family History: History reviewed. No pertinent family history.  Social History:   Social History     Socioeconomic History    Marital status: Single     Spouse name: Not on file    Number of children: Not on file    Years of education: Not on file    Highest education level: Not on file   Occupational History    Not on file   Tobacco Use    Smoking status: Every Day     Current packs/day: 0.50     Types: Cigarettes    Smokeless tobacco: Never   Vaping Use    Vaping Use: Never used   Substance and Sexual Activity    Alcohol use: Yes    Drug use: Yes     Types: Marijuana (Weed)     Comment: daily    Sexual activity: Not on file   Other Topics Concern    Not on file   Social History Narrative    Not on file     Social Determinants of Health     Financial Resource Strain: Not on file   Food Insecurity: Not

## 2024-05-11 NOTE — VIRTUAL HEALTH
[place] Consult to Tele-Psych  Consult performed by: Carol Madrid, APRN - CNP  Consult ordered by: Nolberto Philip DO  Reason for consult: Psychosis      Marilynn Mccullough  [id number]  1995     EMERGENCY DEPARTMENT TELEPSYCHIATRY EVALUATION    05/11/24    Chief Complaint:  “Nothing happened.”    HPI: Patient is a 28 y.o.  female who presents for psychosis. Patient presented to the ED on 05/11/24 via police. History from the ED: \"female with history of polysubstance abuse and underlying psychiatric disorder presenting to the emergency department via police after a civilian call for patient acting abnormally in the street.  Patient was reportedly screaming and acting bizarre in public dangerously walking into the street on and off the sidewalk.  When police arrived she began to take off her clothing and running around.  Police state that patient will intermittently shout insults at them but otherwise is acting erratically.  Unable to obtain any reliable history from patient at this point time.\" Per MAR, patient received IM Ativan, Haldol, and Benadryl this morning upon arrival to the ED. Upon assessment today patient is lethargic and has difficulty staying awake during assessment. Patient mostly responds \"yes\" or \"no.\" Patient does not appear to be a reliable historian at this time. Patient states that she is tired and only remembers being pulled over. Denies SI, HI, and AVH. Denies use of alcohol or drugs, however, per notes patient was found with a crack pipe today and UDS + for amphetamines and cannabinoid. ETOH negative. Per records, patient had recent psychiatric hospitalization at [facility] in April 2024 for similar presentation of psychosis and substance use. Patient states she is not taking any psychiatric medications currently so likely that patient has been noncompliant with outpatient follow up after discharge from recent inpatient psychiatric admission.     Past Psychiatric

## 2024-05-12 VITALS
BODY MASS INDEX: 19.74 KG/M2 | DIASTOLIC BLOOD PRESSURE: 53 MMHG | SYSTOLIC BLOOD PRESSURE: 101 MMHG | WEIGHT: 115 LBS | RESPIRATION RATE: 13 BRPM | TEMPERATURE: 97.6 F | OXYGEN SATURATION: 97 % | HEART RATE: 66 BPM

## 2024-05-12 LAB
EKG ATRIAL RATE: 57 BPM
EKG DIAGNOSIS: NORMAL
EKG P AXIS: -4 DEGREES
EKG P-R INTERVAL: 128 MS
EKG Q-T INTERVAL: 494 MS
EKG QRS DURATION: 74 MS
EKG QTC CALCULATION (BAZETT): 480 MS
EKG R AXIS: -15 DEGREES
EKG T AXIS: -10 DEGREES
EKG VENTRICULAR RATE: 57 BPM

## 2024-05-12 PROCEDURE — 93005 ELECTROCARDIOGRAM TRACING: CPT | Performed by: EMERGENCY MEDICINE

## 2024-05-12 PROCEDURE — 93010 ELECTROCARDIOGRAM REPORT: CPT | Performed by: INTERNAL MEDICINE
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
## 2024-05-12 NOTE — ED PROVIDER NOTES
Patient is endorsed to me by Dr. Campos at 2330. In short, patient presented with  psychosis . The patient was placed in suicide precautions, patient's clothing and belongings were removed, documented and stored in the emergency department.  Patient was reported to me to be medically cleared. I have examined the patient and noted a normal exam and stable vitals. Mental health have evaluated the patient and have recommended that the patient be transferred to a inpatient psychiatric facility. We are currently awaiting placement for the patient.     Transferred to [facility]     Alex Ny MD  05/12/24 0059

## 2024-05-12 NOTE — VIRTUAL HEALTH
Patient on telepsych 24 hour list. Spoke to ED staff who reports that patient is no longer in the ED. Per notes, patient has already been transferred to [facility].

## 2024-05-12 NOTE — ED PROVIDER NOTES
Patient handed off to me by colleague pending psychiatric evaluation.  Patient has been medically cleared and patient was evaluated by mental health personnel.  They have recommended psychiatric facility for this patient for her psychosis.  Pink slip was written.  Patient awaiting acceptance at psychiatric facility.          ICD-10-CM    1. Polysubstance abuse (HCC)  F19.10       2. Methamphetamine abuse (HCC)  F15.10       3. Psychosis, unspecified psychosis type (HCC)  F29         Time to disposition: 8:30 PM       Taty Campos DO  05/11/24 2030

## 2025-04-03 ENCOUNTER — HOSPITAL ENCOUNTER (EMERGENCY)
Age: 30
Discharge: HOME | End: 2025-04-03
Attending: EMERGENCY MEDICINE
Payer: MEDICAID

## 2025-04-03 VITALS
RESPIRATION RATE: 14 BRPM | WEIGHT: 115 LBS | OXYGEN SATURATION: 100 % | BODY MASS INDEX: 19.63 KG/M2 | HEART RATE: 87 BPM | HEIGHT: 64 IN | SYSTOLIC BLOOD PRESSURE: 97 MMHG | DIASTOLIC BLOOD PRESSURE: 57 MMHG | TEMPERATURE: 98.8 F

## 2025-04-03 DIAGNOSIS — F15.10 METHAMPHETAMINE ABUSE: Primary | ICD-10-CM

## 2025-04-03 DIAGNOSIS — T43.651A: ICD-10-CM

## 2025-04-03 LAB
ALBUMIN SERPL-MCNC: 4.6 G/DL (ref 3.4–5)
ALBUMIN/GLOB SERPL: 1.4 {RATIO} (ref 1.1–2.2)
ALP SERPL-CCNC: 45 U/L (ref 40–129)
ALT SERPL-CCNC: 21 U/L (ref 10–40)
AMPHETAMINES UR QL SCN>1000 NG/ML: POSITIVE
ANION GAP SERPL CALCULATED.3IONS-SCNC: 18 MMOL/L (ref 3–16)
APAP SERPL-MCNC: <5 UG/ML (ref 10–30)
AST SERPL-CCNC: 45 U/L (ref 15–37)
BACTERIA URNS QL MICRO: NORMAL /HPF
BARBITURATES UR QL SCN>200 NG/ML: ABNORMAL
BASOPHILS # BLD: 0.1 K/UL (ref 0–0.2)
BASOPHILS NFR BLD: 0.6 %
BENZODIAZ UR QL SCN>200 NG/ML: ABNORMAL
BILIRUB SERPL-MCNC: 0.8 MG/DL (ref 0–1)
BILIRUB UR QL STRIP.AUTO: NEGATIVE
BUN SERPL-MCNC: 16 MG/DL (ref 7–20)
CALCIUM SERPL-MCNC: 9.5 MG/DL (ref 8.3–10.6)
CANNABINOIDS UR QL SCN>50 NG/ML: ABNORMAL
CHARACTER UR: NORMAL
CHLORIDE SERPL-SCNC: 95 MMOL/L (ref 99–110)
CLARITY UR: CLEAR
CO2 SERPL-SCNC: 21 MMOL/L (ref 21–32)
COCAINE UR QL SCN: ABNORMAL
COLOR UR: YELLOW
CREAT SERPL-MCNC: 0.9 MG/DL (ref 0.6–1.1)
DEPRECATED RDW RBC AUTO: 13.4 % (ref 12.4–15.4)
DRUG SCREEN COMMENT UR-IMP: ABNORMAL
EOSINOPHIL # BLD: 0.1 K/UL (ref 0–0.6)
EOSINOPHIL NFR BLD: 0.3 %
EPI CELLS #/AREA URNS HPF: NORMAL /HPF (ref 0–5)
ETHANOLAMINE SERPL-MCNC: NORMAL MG/DL (ref 0–0.08)
FENTANYL SCREEN, URINE: ABNORMAL
GFR SERPLBLD CREATININE-BSD FMLA CKD-EPI: 89 ML/MIN/{1.73_M2}
GLUCOSE SERPL-MCNC: 76 MG/DL (ref 70–99)
GLUCOSE UR STRIP.AUTO-MCNC: NEGATIVE MG/DL
HCG SERPL QL: NEGATIVE
HCT VFR BLD AUTO: 37.9 % (ref 36–48)
HGB BLD-MCNC: 12.8 G/DL (ref 12–16)
HGB UR QL STRIP.AUTO: ABNORMAL
KETONES UR STRIP.AUTO-MCNC: ABNORMAL MG/DL
LEUKOCYTE ESTERASE UR QL STRIP.AUTO: NEGATIVE
LYMPHOCYTES # BLD: 3.5 K/UL (ref 1–5.1)
LYMPHOCYTES NFR BLD: 18.1 %
MAGNESIUM SERPL-MCNC: 1.9 MG/DL (ref 1.8–2.4)
MCH RBC QN AUTO: 30.2 PG (ref 26–34)
MCHC RBC AUTO-ENTMCNC: 33.8 G/DL (ref 31–36)
MCV RBC AUTO: 89.4 FL (ref 80–100)
METHADONE UR QL SCN>300 NG/ML: ABNORMAL
MONOCYTES # BLD: 1.7 K/UL (ref 0–1.3)
MONOCYTES NFR BLD: 8.7 %
NEUTROPHILS # BLD: 14.2 K/UL (ref 1.7–7.7)
NEUTROPHILS NFR BLD: 72.3 %
NITRITE UR QL STRIP.AUTO: NEGATIVE
OPIATES UR QL SCN>300 NG/ML: ABNORMAL
OXYCODONE UR QL SCN: ABNORMAL
PCP UR QL SCN>25 NG/ML: ABNORMAL
PH UR STRIP.AUTO: 6 [PH] (ref 5–8)
PH UR STRIP: 6 [PH]
PLATELET # BLD AUTO: 322 K/UL (ref 135–450)
PMV BLD AUTO: 7.8 FL (ref 5–10.5)
POTASSIUM SERPL-SCNC: 3.5 MMOL/L (ref 3.5–5.1)
PROT SERPL-MCNC: 7.9 G/DL (ref 6.4–8.2)
PROT UR STRIP.AUTO-MCNC: NEGATIVE MG/DL
RBC # BLD AUTO: 4.24 M/UL (ref 4–5.2)
RBC #/AREA URNS HPF: NORMAL /HPF (ref 0–4)
SALICYLATES SERPL-MCNC: <0.5 MG/DL (ref 15–30)
SODIUM SERPL-SCNC: 134 MMOL/L (ref 136–145)
SP GR UR STRIP.AUTO: <=1.005 (ref 1–1.03)
UA COMPLETE W REFLEX CULTURE PNL UR: ABNORMAL
UA DIPSTICK W REFLEX MICRO PNL UR: YES
URN SPEC COLLECT METH UR: ABNORMAL
UROBILINOGEN UR STRIP-ACNC: 0.2 E.U./DL
WBC # BLD AUTO: 19.6 K/UL (ref 4–11)
WBC #/AREA URNS HPF: NORMAL /HPF (ref 0–5)

## 2025-04-03 PROCEDURE — 82077 ASSAY SPEC XCP UR&BREATH IA: CPT

## 2025-04-03 PROCEDURE — 83735 ASSAY OF MAGNESIUM: CPT

## 2025-04-03 PROCEDURE — 81001 URINALYSIS AUTO W/SCOPE: CPT

## 2025-04-03 PROCEDURE — 84703 CHORIONIC GONADOTROPIN ASSAY: CPT

## 2025-04-03 PROCEDURE — 99284 EMERGENCY DEPT VISIT MOD MDM: CPT

## 2025-04-03 PROCEDURE — 85025 COMPLETE CBC W/AUTO DIFF WBC: CPT

## 2025-04-03 PROCEDURE — 80179 DRUG ASSAY SALICYLATE: CPT

## 2025-04-03 PROCEDURE — 80143 DRUG ASSAY ACETAMINOPHEN: CPT

## 2025-04-03 PROCEDURE — 96372 THER/PROPH/DIAG INJ SC/IM: CPT

## 2025-04-03 PROCEDURE — 80053 COMPREHEN METABOLIC PANEL: CPT

## 2025-04-03 PROCEDURE — 6360000002 HC RX W HCPCS: Performed by: EMERGENCY MEDICINE

## 2025-04-03 PROCEDURE — 80307 DRUG TEST PRSMV CHEM ANLYZR: CPT

## 2025-04-03 RX ORDER — HALOPERIDOL 5 MG/ML
10 INJECTION INTRAMUSCULAR ONCE
Status: COMPLETED | OUTPATIENT
Start: 2025-04-03 | End: 2025-04-03

## 2025-04-03 RX ORDER — LORAZEPAM 2 MG/ML
2 INJECTION INTRAMUSCULAR ONCE
Status: COMPLETED | OUTPATIENT
Start: 2025-04-03 | End: 2025-04-03

## 2025-04-03 RX ADMIN — LORAZEPAM 2 MG: 2 INJECTION INTRAMUSCULAR; INTRAVENOUS at 12:11

## 2025-04-03 RX ADMIN — HALOPERIDOL LACTATE 10 MG: 5 INJECTION, SOLUTION INTRAMUSCULAR at 12:11

## 2025-04-03 ASSESSMENT — LIFESTYLE VARIABLES
HOW MANY STANDARD DRINKS CONTAINING ALCOHOL DO YOU HAVE ON A TYPICAL DAY: PATIENT DECLINED
HOW OFTEN DO YOU HAVE A DRINK CONTAINING ALCOHOL: PATIENT DECLINED

## 2025-04-03 ASSESSMENT — PAIN - FUNCTIONAL ASSESSMENT: PAIN_FUNCTIONAL_ASSESSMENT: NONE - DENIES PAIN

## 2025-04-03 NOTE — ED NOTES
Pt discharged in stable condition, AVS and follow up care reviewed, belongings returned by security, all questions answered.

## 2025-04-03 NOTE — ED PROVIDER NOTES
I received this patient in signout from Dr. Moran.  We discussed the patient's initial history, physical, workup thus far, and medical decision making to this point.    Briefly, Marilynn Mccullough is a 29 y.o. female  who presents to the ED complaining of meth intoxication.  Initial history/exam also pertinent for relapse, but has been mostly calm/cooperative, not suicidal or acutely psychotic.  No trauma.    Pending studies at time of signout include: sober reevaluation    The patient will be reassessed after workup above is completed.  Anticipated disposition at time of signout is d/c when more sober if ambulatory and tolerating PO      EKG performed in ED:  None      RADIOLOGY  Any applicable radiology studies including x-ray, CT, MRI, and/or ultrasound, were reviewed independently by me in addition to the radiologist.  I reviewed all radiology images and reports as well from this evaluation.    No imaging performed      ED COURSE/MDM  I introduced myself to the patient and performed my initial assessment on Marilynn Mccullough.  There is no significant change in the patient's history from what is documented initially by Dr. Moran  after my evaluation.    Old records reviewed. Labs and imaging reviewed.  On my physical examination of the patient, she is ambulatory, calm, cooperative, not psychotic suicidal or homicidal.  Rescinding her psych hold placed by police.  Do not feel this requires inpt psych care as her findings are most c/w methamphetamine abuse.  Her boyfriend is coming to pick her up and take her home.      Is this patient to be included in the SEP-1 Core Measure due to severe sepsis or septic shock?   No     Exclusion criteria - the patient is NOT to be included for SEP-1 Core Measure due to:  Infection is not suspected        Consults:  None    History obtained from: Patient    Pertinent social determinants of health: Does not have a PCP and Psychosocial condition (including substance abuse or  mental illness)    Chronic conditions potentially affecting care: None applicable    Review of other records:  None    Reassessment:  See [facility] for details of medications given and reassessment    During the patient's ED course, the patient was given:  Medications   haloperidol lactate (HALDOL) injection 10 mg (10 mg IntraMUSCular Given 4/3/25 1211)   LORazepam (ATIVAN) injection 2 mg (2 mg IntraMUSCular Given 4/3/25 1211)        CLINICAL IMPRESSION  1. Methamphetamine abuse    2. Unintentional methamphetamine poisoning, initial encounter        Blood pressure 98/70, pulse (!) 103, temperature 98.8 °F (37.1 °C), temperature source Oral, resp. rate 14, height 1.626 m (5' 4\"), weight 52.2 kg (115 lb), SpO2 98%.    DISPOSITION  Marilynn Mccullough was discharged in stable condition.    I have discussed the findings of today's workup with the patient and addressed the patient's questions and concerns.  Important warning signs as well as new or worsening symptoms which would necessitate immediate return to the ED were discussed.  The plan is to discharge from the ED at this time, and the patient is in stable condition.  The patient acknowledged understanding is agreeable with this plan.      Follow-up with:  [facility] Emergency Department  [address] [address]  [phone]    As needed, If symptoms worsen or new symptoms develop    [facility]  [address] [place] [address]  [phone]  Schedule an appointment as soon as possible for a visit   for re-evaluation      Critical care time:  None    DISCLAIMER: This chart was created using Dragon dictation software.  Efforts were made by me to ensure accuracy, however some errors may be present due to limitations of this technology and occasionally words are not transcribed correctly.        Jayme Cordova MD  04/03/25 [id number]

## 2025-04-03 NOTE — ED PROVIDER NOTES
EMERGENCY MEDICINE PROVIDER NOTE    Patient Identification  Pt Name: Marilynn Mccullough  MRN: [id number]  Birthdate 1995  Date of evaluation: 4/3/2025  Provider: Lacho Moran MD  [facility]P: [facility] (Inactive)    Chief Complaint  Mental Health Problem (Pt brought by police from walking in traffic having a psychotic episode.  Pt states 'I was leaving the gas station and was walking across the street' )      HPI  (History provided by patient and police; limited by patient's altered mental status)  This is a 29 y.o. female who was brought in by EMS transportation for abnormal behavior, suspected to be secondary to illicit substance.  Per police, patient was walking down [address] [employer] in the middle of traffic behaving abnormally with rapid, nonsensical speech. She was mildly uncooperative with police, but did not become combative.  Police were able to speak with the patient's boyfriend to obtain additional history.  He notified them of the patient's previous history of polysubstance abuse.  He also noted the patient had been clean for some time, but relapsed recently with methamphetamines..  On arrival to the ED, patient was speaking rapidly and non-stop, acknowledging but not answering questions asked of her.  Further history was unobtainable on initial evaluation.    I have reviewed the following nursing documentation:  Allergies: Patient has no known allergies.    Past medical history:   Past Medical History:   Diagnosis Date    Chiari malformation type I (HCC)     Migraine     Polysubstance abuse      Past surgical history: No past surgical history on file.  Social history:  reports that she has been smoking. She has never used smokeless tobacco. She reports current alcohol use. She reports current drug use. Drug: Marijuana (Weed).  Family history:  No family history on file.    Home medications:   Prior to Admission Medications   Prescriptions Last Dose Informant Patient Reported? Taking?  reflex Magnesium 3.5 3.5 - 5.1 mmol/L    Chloride 95 (L) 99 - 110 mmol/L    CO2 21 21 - 32 mmol/L    Anion Gap 18 (H) 3 - 16    Glucose 76 70 - 99 mg/dL    BUN 16 7 - 20 mg/dL    Creatinine 0.9 0.6 - 1.1 mg/dL    Est Glom Filt Rate 89 >60    Calcium 9.5 8.3 - 10.6 mg/dL    Total Protein 7.9 6.4 - 8.2 g/dL    Albumin 4.6 3.4 - 5.0 g/dL    Albumin/Globulin Ratio 1.4 1.1 - 2.2    Total Bilirubin 0.8 0.0 - 1.0 mg/dL    Alkaline Phosphatase 45 40 - 129 U/L    ALT 21 10 - 40 U/L    AST 45 (H) 15 - 37 U/L   HCG Qualitative, Serum   Result Value Ref Range    Preg, Serum Negative Detects HCG level >10 MIU/mL   Urinalysis with Reflex to Culture    Specimen: Urine, clean catch   Result Value Ref Range    Color, UA Yellow Straw/Yellow    Clarity, UA Clear Clear    Glucose, Ur Negative Negative mg/dL    Bilirubin, Urine Negative Negative    Ketones, Urine TRACE (A) Negative mg/dL    Specific Gravity, UA <=1.005 1.005 - 1.030    Blood, Urine SMALL (A) Negative    pH, Urine 6.0 5.0 - 8.0    Protein, UA Negative Negative mg/dL    Urobilinogen, Urine 0.2 <2.0 E.U./dL    Nitrite, Urine Negative Negative    Leukocyte Esterase, Urine Negative Negative    Microscopic Examination YES     Urine Type Voided     Urine Reflex to Culture Not Indicated    Salicylate   Result Value Ref Range    Salicylate Lvl <0.5 (L) 15.0 - 30.0 mg/dL   Acetaminophen Level   Result Value Ref Range    Acetaminophen Level <5 (L) 10 - 30 ug/mL   Urine Drug Screen   Result Value Ref Range    Amphetamine Screen, Ur POSITIVE (A) Negative <1000ng/mL    Barbiturate Screen, Ur Neg Negative <200 ng/mL    Benzodiazepine Screen, Urine Neg Negative <200 ng/mL    Cannabinoid Scrn, Ur Neg Negative <50 ng/mL    Cocaine Metabolite Screen, Urine Neg Negative <300 ng/mL    Opiate Screen, Urine Neg Negative <300 ng/mL    Phencyclidine (PCP), Screen, Urine Neg Negative <25 ng/mL    Methadone Screen, Urine Neg Negative <300 ng/mL    Oxycodone Urine Neg Negative <100 ng/ml     Ref Range    WBC, UA 0-2 0 - 5 /HPF    RBC, UA 0-2 0 - 4 /HPF    Epithelial Cells, UA 0-1 0 - 5 /HPF    Bacteria, UA None Seen None Seen /HPF    Urinalysis Comments see below    Magnesium   Result Value Ref Range    Magnesium 1.90 1.80 - 2.40 mg/dL     Medications Given During ED Visit  Medications   haloperidol lactate (HALDOL) injection 10 mg (10 mg IntraMUSCular Given 4/3/25 1211)   LORazepam (ATIVAN) injection 2 mg (2 mg IntraMUSCular Given 4/3/25 1211)       Records Reviewed  I reviewed previous hospital and ED visits from other facilities, including from [facility] in [place].  He presented there with almost the exact same history a year ago in April 2024.  Ultimately, it was determined her acute psychosis was secondary to methamphetamine abuse.    Chronic Conditions affecting care   has a past medical history of Chiari malformation type I (HCC), Migraine, and Polysubstance abuse.    MDM and ED Course  ***(EMP MDM)    The total Critical Care time is *** minutes which excludes separately billable procedures.      Final Impression  No diagnosis found.    Blood pressure 98/70, pulse (!) 103, temperature 98.8 °F (37.1 °C), temperature source Oral, resp. rate 14, height 1.626 m (5' 4\"), weight 52.2 kg (115 lb), SpO2 98%.     Disposition:  DISPOSITION                 Patient Referrals:  No follow-up provider specified.    Discharge Medications:  New Prescriptions    No medications on file       This chart was generated using the Dragon dictation system. I created this record but it may contain dictation errors given the limitations of this technology.

## 2025-04-03 NOTE — ED NOTES
Spoke to pts boyfrienbrenda Jose Guadalupe, updated on plan of care.  Plan for pt to go home at discharge at this moment.  Dr Cordova to re-eval once pt is awake and ambulating

## 2025-04-03 NOTE — ED NOTES
Pt states she lives with her mother, per mother pt does not live there and is not allowed to stay there.  Pt has been using meth for at least 2 months and lives with her boyfriend Jose Guadalupe Anaya at [address] [place] [id number] [phone]

## 2025-04-03 NOTE — ED NOTES
Pt placed in a gown with strings removed, all belongings removed, inventoried and given to security, Coat, purse, clothes, shoes  Room is safe for patient, all monitor wires removed, all other cords removed except call light.  Call light given to patient .  Door remains open for safety of patient and .

## 2025-04-03 NOTE — ED NOTES
Spoke to Jose Guadalupe Anaya pts boyfriend and where pt has been living for last 9 months.  Per Jose Guadalupe, pt has been awake for over 48 hours, plan was to get her to [facility] yesterday, but pt was upset with the  and took off.  Jose Guadalupe and [place] police had been looking for patient  for 24hours.  Found at the gas station and was ticketed for disorderly conduct.  Pt is not under arrest at this time.

## 2025-04-05 ENCOUNTER — HOSPITAL ENCOUNTER (EMERGENCY)
Age: 30
Discharge: HOME | End: 2025-04-05
Attending: INTERNAL MEDICINE
Payer: MEDICAID

## 2025-04-05 VITALS
SYSTOLIC BLOOD PRESSURE: 105 MMHG | WEIGHT: 115 LBS | DIASTOLIC BLOOD PRESSURE: 84 MMHG | HEART RATE: 81 BPM | BODY MASS INDEX: 19.74 KG/M2 | TEMPERATURE: 98.1 F | OXYGEN SATURATION: 100 % | RESPIRATION RATE: 14 BRPM

## 2025-04-05 DIAGNOSIS — G24.9 DYSTONIA: Primary | ICD-10-CM

## 2025-04-05 LAB
ALBUMIN SERPL-MCNC: 4 G/DL (ref 3.4–5)
ALBUMIN/GLOB SERPL: 1.5 {RATIO} (ref 1.1–2.2)
ALP SERPL-CCNC: 43 U/L (ref 40–129)
ALT SERPL-CCNC: 21 U/L (ref 10–40)
ANION GAP SERPL CALCULATED.3IONS-SCNC: 11 MMOL/L (ref 3–16)
AST SERPL-CCNC: 40 U/L (ref 15–37)
BASOPHILS # BLD: 0 K/UL (ref 0–0.2)
BASOPHILS NFR BLD: 0.6 %
BILIRUB SERPL-MCNC: 0.3 MG/DL (ref 0–1)
BUN SERPL-MCNC: 4 MG/DL (ref 7–20)
CALCIUM SERPL-MCNC: 9 MG/DL (ref 8.3–10.6)
CHLORIDE SERPL-SCNC: 106 MMOL/L (ref 99–110)
CO2 SERPL-SCNC: 23 MMOL/L (ref 21–32)
CREAT SERPL-MCNC: 0.6 MG/DL (ref 0.6–1.1)
DEPRECATED RDW RBC AUTO: 13.6 % (ref 12.4–15.4)
EOSINOPHIL # BLD: 0.2 K/UL (ref 0–0.6)
EOSINOPHIL NFR BLD: 2.2 %
GFR SERPLBLD CREATININE-BSD FMLA CKD-EPI: >90 ML/MIN/{1.73_M2}
GLUCOSE SERPL-MCNC: 100 MG/DL (ref 70–99)
HCG SERPL QL: NEGATIVE
HCT VFR BLD AUTO: 35.5 % (ref 36–48)
HGB BLD-MCNC: 12.1 G/DL (ref 12–16)
LYMPHOCYTES # BLD: 1.8 K/UL (ref 1–5.1)
LYMPHOCYTES NFR BLD: 24.9 %
MAGNESIUM SERPL-MCNC: 1.99 MG/DL (ref 1.8–2.4)
MCH RBC QN AUTO: 30.5 PG (ref 26–34)
MCHC RBC AUTO-ENTMCNC: 34 G/DL (ref 31–36)
MCV RBC AUTO: 89.6 FL (ref 80–100)
MONOCYTES # BLD: 0.8 K/UL (ref 0–1.3)
MONOCYTES NFR BLD: 11 %
NEUTROPHILS # BLD: 4.4 K/UL (ref 1.7–7.7)
NEUTROPHILS NFR BLD: 61.3 %
PLATELET # BLD AUTO: 274 K/UL (ref 135–450)
PMV BLD AUTO: 8.1 FL (ref 5–10.5)
POTASSIUM SERPL-SCNC: 4.1 MMOL/L (ref 3.5–5.1)
PROT SERPL-MCNC: 6.7 G/DL (ref 6.4–8.2)
RBC # BLD AUTO: 3.96 M/UL (ref 4–5.2)
SODIUM SERPL-SCNC: 140 MMOL/L (ref 136–145)
WBC # BLD AUTO: 7.1 K/UL (ref 4–11)

## 2025-04-05 PROCEDURE — 96374 THER/PROPH/DIAG INJ IV PUSH: CPT

## 2025-04-05 PROCEDURE — 84703 CHORIONIC GONADOTROPIN ASSAY: CPT

## 2025-04-05 PROCEDURE — 85025 COMPLETE CBC W/AUTO DIFF WBC: CPT

## 2025-04-05 PROCEDURE — 99284 EMERGENCY DEPT VISIT MOD MDM: CPT

## 2025-04-05 PROCEDURE — 96361 HYDRATE IV INFUSION ADD-ON: CPT

## 2025-04-05 PROCEDURE — 2580000003 HC RX 258: Performed by: PHYSICIAN ASSISTANT

## 2025-04-05 PROCEDURE — 80053 COMPREHEN METABOLIC PANEL: CPT

## 2025-04-05 PROCEDURE — 83735 ASSAY OF MAGNESIUM: CPT

## 2025-04-05 PROCEDURE — 6360000002 HC RX W HCPCS: Performed by: PHYSICIAN ASSISTANT

## 2025-04-05 RX ORDER — DIPHENHYDRAMINE HYDROCHLORIDE 50 MG/ML
50 INJECTION, SOLUTION INTRAMUSCULAR; INTRAVENOUS ONCE
Status: DISCONTINUED | OUTPATIENT
Start: 2025-04-05 | End: 2025-04-05

## 2025-04-05 RX ORDER — 0.9 % SODIUM CHLORIDE 0.9 %
1000 INTRAVENOUS SOLUTION INTRAVENOUS ONCE
Status: COMPLETED | OUTPATIENT
Start: 2025-04-05 | End: 2025-04-05

## 2025-04-05 RX ORDER — DIPHENHYDRAMINE HYDROCHLORIDE 50 MG/ML
50 INJECTION, SOLUTION INTRAMUSCULAR; INTRAVENOUS ONCE
Status: COMPLETED | OUTPATIENT
Start: 2025-04-05 | End: 2025-04-05

## 2025-04-05 RX ADMIN — SODIUM CHLORIDE 1000 ML: 0.9 INJECTION, SOLUTION INTRAVENOUS at 11:38

## 2025-04-05 RX ADMIN — DIPHENHYDRAMINE HYDROCHLORIDE 50 MG: 50 INJECTION INTRAMUSCULAR; INTRAVENOUS at 11:37

## 2025-04-05 ASSESSMENT — LIFESTYLE VARIABLES
HOW OFTEN DO YOU HAVE A DRINK CONTAINING ALCOHOL: NEVER
HOW MANY STANDARD DRINKS CONTAINING ALCOHOL DO YOU HAVE ON A TYPICAL DAY: PATIENT DOES NOT DRINK
HOW OFTEN DO YOU HAVE A DRINK CONTAINING ALCOHOL: NEVER
HOW MANY STANDARD DRINKS CONTAINING ALCOHOL DO YOU HAVE ON A TYPICAL DAY: PATIENT DOES NOT DRINK

## 2025-04-05 ASSESSMENT — PAIN - FUNCTIONAL ASSESSMENT: PAIN_FUNCTIONAL_ASSESSMENT: NONE - DENIES PAIN

## 2025-04-05 NOTE — ED PROVIDER NOTES
In addition to the advanced practice provider, I personally saw Marilynn Mccullough and performed a substantive portion of the visit including all aspects of the medical decision making.    Medical Decision Making  29-year-old female presents the ER for tongue spasms.  She states when this occurs it is difficult for her to speak.  She denies any weakness.  Patient states she did not drink any alcohol and about a week.  She has had methamphetamine.  She does use marijuana.  Patient was given a liter of fluids and Benadryl.  After the Benadryl the symptoms improved.  Heart has a regular rhythm and rate.  Lungs are clear bilaterally.  Patient is nontoxic and nonseptic in appearance.  I cannot see any tongue tremor or tremor in the lower extremities.  I do not see any upper extremity tremor.  I do not see any evidence of alcohol withdrawal.  This may be secondary to methamphetamines.  This seems unlikely due to Haldol that she was given a while ago.  Patient is nontoxic and nonseptic in appearance.  I will suspicion for seizure.  Patient is stable for discharge.  I agree with the assessment and plan of the PA        SEP-1  Is this patient to be included in the SEP-1 Core Measure due to severe sepsis or septic shock?   no    Screenings     Jazmine Coma Scale  Eye Opening: Spontaneous  Best Verbal Response: Oriented  Best Motor Response: Obeys commands  Jazmine Coma Scale Score: 15             Patient Referrals:  [facility] Emergency Department  [address] [address]  [phone]    If symptoms worsen      Discharge Medications:  Discharge Medication List as of 4/5/2025 12:59 PM          FINAL IMPRESSION  1. Dystonia        Blood pressure 105/84, pulse 81, temperature 98.1 °F (36.7 °C), temperature source Oral, resp. rate 14, weight 52.2 kg (115 lb), last menstrual period 03/28/2025, SpO2 100%.     I appreciate the DIPIKA's collaboration on this case.    For further details of Marilynn Mccullough's emergency 
Pregnancy test is negative, CBC, CMP, magnesium unremarkable.  Do not believe any further workup or testing is warranted at this time.  Patient will follow-up as an outpatient return here for any worsening of symptoms or problems at home.    Disposition Considerations (tests considered but not done, Admit vs D/C, Shared Decision Making, Pt Expectation of Test or Tx.):        I am the Primary Clinician of Record.  FINAL IMPRESSION      1. Dystonia          DISPOSITION/PLAN     DISPOSITION Decision To Discharge 04/05/2025 12:36:06 PM   DISPOSITION CONDITION Stable           PATIENT REFERRED TO:  [facility] Emergency Department  [address] [place] [address]  [phone]    If symptoms worsen      DISCHARGE MEDICATIONS:  New Prescriptions    No medications on file       DISCONTINUED MEDICATIONS:  Discontinued Medications    No medications on file              (Please note that portions of this note were completed with a voice recognition program.  Efforts were made to edit the dictations but occasionally words are mis-transcribed.)    Otf Zuniga PA-C (electronically signed)       Otf Zuniga PA-C  04/05/25 8006

## 2025-04-17 ENCOUNTER — HOSPITAL ENCOUNTER (INPATIENT)
Age: 30
LOS: 2 days | Discharge: HOME | DRG: 720 | End: 2025-04-19
Attending: STUDENT IN AN ORGANIZED HEALTH CARE EDUCATION/TRAINING PROGRAM | Admitting: STUDENT IN AN ORGANIZED HEALTH CARE EDUCATION/TRAINING PROGRAM
Payer: MEDICAID

## 2025-04-17 ENCOUNTER — APPOINTMENT (OUTPATIENT)
Dept: CT IMAGING | Age: 30
DRG: 720 | End: 2025-04-17
Payer: MEDICAID

## 2025-04-17 ENCOUNTER — APPOINTMENT (OUTPATIENT)
Dept: GENERAL RADIOLOGY | Age: 30
DRG: 720 | End: 2025-04-17
Payer: MEDICAID

## 2025-04-17 DIAGNOSIS — A41.9 SEVERE SEPSIS (HCC): Primary | ICD-10-CM

## 2025-04-17 DIAGNOSIS — E87.6 HYPOKALEMIA: ICD-10-CM

## 2025-04-17 DIAGNOSIS — R65.20 SEVERE SEPSIS (HCC): Primary | ICD-10-CM

## 2025-04-17 DIAGNOSIS — F19.10 POLYSUBSTANCE ABUSE (HCC): ICD-10-CM

## 2025-04-17 PROBLEM — R65.21 SEPTIC SHOCK (HCC): Status: ACTIVE | Noted: 2025-04-17

## 2025-04-17 LAB
ALBUMIN SERPL-MCNC: 4.2 G/DL (ref 3.4–5)
ALBUMIN/GLOB SERPL: 1.3 {RATIO} (ref 1.1–2.2)
ALP SERPL-CCNC: 75 U/L (ref 40–129)
ALT SERPL-CCNC: 35 U/L (ref 10–40)
AMPHET UR QL SCN: POSITIVE
ANION GAP SERPL CALCULATED.3IONS-SCNC: 26 MMOL/L (ref 9–17)
ARTERIAL PATENCY WRIST A: ABNORMAL
AST SERPL-CCNC: 54 U/L (ref 15–37)
B PARAP IS1001 DNA NPH QL NAA+NON-PROBE: NOT DETECTED
B PERT DNA SPEC QL NAA+PROBE: NOT DETECTED
B-HCG SERPL EIA 3RD IS-ACNC: <1 MIU/ML
BACTERIA URNS QL MICRO: ABNORMAL
BARBITURATES UR QL SCN: NEGATIVE
BASOPHILS # BLD: 0.09 K/UL
BASOPHILS NFR BLD: 0 % (ref 0–1)
BENZODIAZ UR QL: NEGATIVE
BILIRUB SERPL-MCNC: 0.9 MG/DL (ref 0–1)
BILIRUB UR QL STRIP: NEGATIVE
BODY TEMPERATURE: 37
BUN SERPL-MCNC: 6 MG/DL (ref 7–20)
C PNEUM DNA NPH QL NAA+NON-PROBE: NOT DETECTED
CALCIUM SERPL-MCNC: 9.1 MG/DL (ref 8.3–10.6)
CANNABINOIDS UR QL SCN: POSITIVE
CHARACTER UR: ABNORMAL
CHLORIDE SERPL-SCNC: 88 MMOL/L (ref 99–110)
CLARITY UR: ABNORMAL
CO2 SERPL-SCNC: 15 MMOL/L (ref 21–32)
COCAINE UR QL SCN: POSITIVE
COHGB MFR BLD: 0.3 % (ref 0.5–1.5)
COLOR UR: YELLOW
CREAT SERPL-MCNC: 1 MG/DL (ref 0.6–1.1)
EOSINOPHIL # BLD: 0.03 K/UL
EOSINOPHILS RELATIVE PERCENT: 0 % (ref 0–3)
EPI CELLS #/AREA URNS HPF: 1 /HPF
ERYTHROCYTE [DISTWIDTH] IN BLOOD BY AUTOMATED COUNT: 12.1 % (ref 11.7–14.9)
ETHANOLAMINE SERPL-MCNC: <10 MG/DL (ref 0–0.08)
FENTANYL UR QL: NEGATIVE
FLUAV RNA NPH QL NAA+NON-PROBE: NOT DETECTED
FLUBV RNA NPH QL NAA+NON-PROBE: NOT DETECTED
GFR, ESTIMATED: 67 ML/MIN/1.73M2
GLUCOSE SERPL-MCNC: 195 MG/DL (ref 74–99)
GLUCOSE UR STRIP-MCNC: NEGATIVE MG/DL
HADV DNA NPH QL NAA+NON-PROBE: NOT DETECTED
HCO3 VENOUS: 20.4 MMOL/L (ref 22–29)
HCOV 229E RNA NPH QL NAA+NON-PROBE: NOT DETECTED
HCOV HKU1 RNA NPH QL NAA+NON-PROBE: NOT DETECTED
HCOV NL63 RNA NPH QL NAA+NON-PROBE: NOT DETECTED
HCOV OC43 RNA NPH QL NAA+NON-PROBE: NOT DETECTED
HCT VFR BLD AUTO: 34.6 % (ref 37–47)
HGB BLD-MCNC: 11.7 G/DL (ref 12.5–16)
HGB UR QL STRIP.AUTO: ABNORMAL
HMPV RNA NPH QL NAA+NON-PROBE: NOT DETECTED
HPIV1 RNA NPH QL NAA+NON-PROBE: NOT DETECTED
HPIV2 RNA NPH QL NAA+NON-PROBE: NOT DETECTED
HPIV3 RNA NPH QL NAA+NON-PROBE: NOT DETECTED
HPIV4 RNA NPH QL NAA+NON-PROBE: NOT DETECTED
IMM GRANULOCYTES # BLD AUTO: 0.15 K/UL
IMM GRANULOCYTES NFR BLD: 1 %
KETONES UR STRIP-MCNC: 15 MG/DL
LACTATE BLDV-SCNC: 0.6 MMOL/L (ref 0.4–2)
LACTATE BLDV-SCNC: 11.1 MMOL/L (ref 0.4–2)
LEUKOCYTE ESTERASE UR QL STRIP: ABNORMAL
LIPASE SERPL-CCNC: 12 U/L (ref 13–60)
LYMPHOCYTES NFR BLD: 3.17 K/UL
LYMPHOCYTES RELATIVE PERCENT: 13 % (ref 24–44)
M PNEUMO DNA NPH QL NAA+NON-PROBE: NOT DETECTED
MAGNESIUM SERPL-MCNC: 2 MG/DL (ref 1.8–2.4)
MCH RBC QN AUTO: 29.8 PG (ref 27–31)
MCHC RBC AUTO-ENTMCNC: 33.8 G/DL (ref 32–36)
MCV RBC AUTO: 88.3 FL (ref 78–100)
METHEMOGLOBIN: 0.7 % (ref 0.5–1.5)
MONOCYTES NFR BLD: 14 % (ref 0–5)
MONOCYTES NFR BLD: 3.53 K/UL
NEGATIVE BASE EXCESS, VEN: 3.8 MMOL/L (ref 0–3)
NEUTROPHILS NFR BLD: 72 % (ref 36–66)
NEUTS SEG NFR BLD: 18.27 K/UL
NITRITE UR QL STRIP: POSITIVE
OPIATES UR QL SCN: NEGATIVE
OXYCODONE UR QL SCN: NEGATIVE
OXYHGB MFR BLD: 94.1 %
PCO2 VENOUS: 34.1 MM HG (ref 38–54)
PH UR STRIP: 6 [PH] (ref 5–8)
PH VENOUS: 7.39 (ref 7.32–7.43)
PLATELET # BLD AUTO: 372 K/UL (ref 140–440)
PMV BLD AUTO: 9.9 FL (ref 7.5–11.1)
PO2 VENOUS: 85.1 MM HG (ref 23–48)
POTASSIUM SERPL-SCNC: 3.3 MMOL/L (ref 3.5–5.1)
PROCALCITONIN SERPL-MCNC: 0.25 NG/ML
PROT SERPL-MCNC: 7.4 G/DL (ref 6.4–8.2)
PROT UR STRIP-MCNC: ABNORMAL MG/DL
RBC # BLD AUTO: 3.92 M/UL (ref 4.2–5.4)
RBC #/AREA URNS HPF: 10 /HPF (ref 0–2)
RSV RNA NPH QL NAA+NON-PROBE: NOT DETECTED
RV+EV RNA NPH QL NAA+NON-PROBE: NOT DETECTED
SARS-COV-2 RNA NPH QL NAA+NON-PROBE: NOT DETECTED
SODIUM SERPL-SCNC: 129 MMOL/L (ref 136–145)
SP GR UR STRIP: <1.005 (ref 1–1.03)
SPECIMEN DESCRIPTION: NORMAL
TEST INFORMATION: ABNORMAL
UROBILINOGEN UR STRIP-ACNC: 4 EU/DL (ref 0–1)
WBC #/AREA URNS HPF: 44 /HPF (ref 0–5)
WBC OTHER # BLD: 25.2 K/UL (ref 4–10.5)

## 2025-04-17 PROCEDURE — 87088 URINE BACTERIA CULTURE: CPT

## 2025-04-17 PROCEDURE — 0202U NFCT DS 22 TRGT SARS-COV-2: CPT

## 2025-04-17 PROCEDURE — 70450 CT HEAD/BRAIN W/O DYE: CPT

## 2025-04-17 PROCEDURE — 84145 PROCALCITONIN (PCT): CPT

## 2025-04-17 PROCEDURE — 6370000000 HC RX 637 (ALT 250 FOR IP): Performed by: STUDENT IN AN ORGANIZED HEALTH CARE EDUCATION/TRAINING PROGRAM

## 2025-04-17 PROCEDURE — 93005 ELECTROCARDIOGRAM TRACING: CPT | Performed by: STUDENT IN AN ORGANIZED HEALTH CARE EDUCATION/TRAINING PROGRAM

## 2025-04-17 PROCEDURE — 51701 INSERT BLADDER CATHETER: CPT

## 2025-04-17 PROCEDURE — 87086 URINE CULTURE/COLONY COUNT: CPT

## 2025-04-17 PROCEDURE — 36415 COLL VENOUS BLD VENIPUNCTURE: CPT

## 2025-04-17 PROCEDURE — 81001 URINALYSIS AUTO W/SCOPE: CPT

## 2025-04-17 PROCEDURE — 83605 ASSAY OF LACTIC ACID: CPT

## 2025-04-17 PROCEDURE — 96365 THER/PROPH/DIAG IV INF INIT: CPT

## 2025-04-17 PROCEDURE — 85025 COMPLETE CBC W/AUTO DIFF WBC: CPT

## 2025-04-17 PROCEDURE — 6360000002 HC RX W HCPCS: Performed by: STUDENT IN AN ORGANIZED HEALTH CARE EDUCATION/TRAINING PROGRAM

## 2025-04-17 PROCEDURE — 90471 IMMUNIZATION ADMIN: CPT | Performed by: STUDENT IN AN ORGANIZED HEALTH CARE EDUCATION/TRAINING PROGRAM

## 2025-04-17 PROCEDURE — 87186 SC STD MICRODIL/AGAR DIL: CPT

## 2025-04-17 PROCEDURE — 83690 ASSAY OF LIPASE: CPT

## 2025-04-17 PROCEDURE — 84702 CHORIONIC GONADOTROPIN TEST: CPT

## 2025-04-17 PROCEDURE — 96375 TX/PRO/DX INJ NEW DRUG ADDON: CPT

## 2025-04-17 PROCEDURE — 2500000003 HC RX 250 WO HCPCS: Performed by: STUDENT IN AN ORGANIZED HEALTH CARE EDUCATION/TRAINING PROGRAM

## 2025-04-17 PROCEDURE — 87040 BLOOD CULTURE FOR BACTERIA: CPT

## 2025-04-17 PROCEDURE — 90715 TDAP VACCINE 7 YRS/> IM: CPT | Performed by: STUDENT IN AN ORGANIZED HEALTH CARE EDUCATION/TRAINING PROGRAM

## 2025-04-17 PROCEDURE — 83735 ASSAY OF MAGNESIUM: CPT

## 2025-04-17 PROCEDURE — 99285 EMERGENCY DEPT VISIT HI MDM: CPT

## 2025-04-17 PROCEDURE — 82805 BLOOD GASES W/O2 SATURATION: CPT

## 2025-04-17 PROCEDURE — 2140000000 HC CCU INTERMEDIATE R&B

## 2025-04-17 PROCEDURE — 2580000003 HC RX 258: Performed by: STUDENT IN AN ORGANIZED HEALTH CARE EDUCATION/TRAINING PROGRAM

## 2025-04-17 PROCEDURE — 80307 DRUG TEST PRSMV CHEM ANLYZR: CPT

## 2025-04-17 PROCEDURE — G0480 DRUG TEST DEF 1-7 CLASSES: HCPCS

## 2025-04-17 PROCEDURE — 71045 X-RAY EXAM CHEST 1 VIEW: CPT

## 2025-04-17 PROCEDURE — 80053 COMPREHEN METABOLIC PANEL: CPT

## 2025-04-17 RX ORDER — SODIUM CHLORIDE 9 MG/ML
INJECTION, SOLUTION INTRAVENOUS PRN
Status: DISCONTINUED | OUTPATIENT
Start: 2025-04-17 | End: 2025-04-19 | Stop reason: HOSPADM

## 2025-04-17 RX ORDER — POTASSIUM CHLORIDE 1500 MG/1
20 TABLET, EXTENDED RELEASE ORAL ONCE
Status: DISCONTINUED | OUTPATIENT
Start: 2025-04-17 | End: 2025-04-17

## 2025-04-17 RX ORDER — ACETAMINOPHEN 650 MG/1
650 SUPPOSITORY RECTAL EVERY 6 HOURS PRN
Status: DISCONTINUED | OUTPATIENT
Start: 2025-04-17 | End: 2025-04-19 | Stop reason: HOSPADM

## 2025-04-17 RX ORDER — SODIUM CHLORIDE 0.9 % (FLUSH) 0.9 %
5-40 SYRINGE (ML) INJECTION PRN
Status: DISCONTINUED | OUTPATIENT
Start: 2025-04-17 | End: 2025-04-19 | Stop reason: HOSPADM

## 2025-04-17 RX ORDER — OLANZAPINE 5 MG/1
20 TABLET, ORALLY DISINTEGRATING ORAL ONCE
Status: DISCONTINUED | OUTPATIENT
Start: 2025-04-17 | End: 2025-04-17

## 2025-04-17 RX ORDER — POTASSIUM CHLORIDE 7.45 MG/ML
10 INJECTION INTRAVENOUS PRN
Status: DISCONTINUED | OUTPATIENT
Start: 2025-04-17 | End: 2025-04-19 | Stop reason: HOSPADM

## 2025-04-17 RX ORDER — MAGNESIUM SULFATE IN WATER 40 MG/ML
2000 INJECTION, SOLUTION INTRAVENOUS PRN
Status: DISCONTINUED | OUTPATIENT
Start: 2025-04-17 | End: 2025-04-19 | Stop reason: HOSPADM

## 2025-04-17 RX ORDER — SODIUM CHLORIDE, SODIUM LACTATE, POTASSIUM CHLORIDE, CALCIUM CHLORIDE 600; 310; 30; 20 MG/100ML; MG/100ML; MG/100ML; MG/100ML
INJECTION, SOLUTION INTRAVENOUS CONTINUOUS
Status: DISPENSED | OUTPATIENT
Start: 2025-04-17 | End: 2025-04-18

## 2025-04-17 RX ORDER — ACETAMINOPHEN 650 MG/1
650 SUPPOSITORY RECTAL ONCE
Status: COMPLETED | OUTPATIENT
Start: 2025-04-17 | End: 2025-04-17

## 2025-04-17 RX ORDER — SODIUM CHLORIDE, SODIUM LACTATE, POTASSIUM CHLORIDE, AND CALCIUM CHLORIDE .6; .31; .03; .02 G/100ML; G/100ML; G/100ML; G/100ML
500 INJECTION, SOLUTION INTRAVENOUS ONCE
Status: COMPLETED | OUTPATIENT
Start: 2025-04-17 | End: 2025-04-18

## 2025-04-17 RX ORDER — ENOXAPARIN SODIUM 100 MG/ML
40 INJECTION SUBCUTANEOUS DAILY
Status: DISCONTINUED | OUTPATIENT
Start: 2025-04-18 | End: 2025-04-19 | Stop reason: HOSPADM

## 2025-04-17 RX ORDER — ACETAMINOPHEN 325 MG/1
650 TABLET ORAL EVERY 6 HOURS PRN
Status: DISCONTINUED | OUTPATIENT
Start: 2025-04-17 | End: 2025-04-19 | Stop reason: HOSPADM

## 2025-04-17 RX ORDER — ONDANSETRON 2 MG/ML
4 INJECTION INTRAMUSCULAR; INTRAVENOUS EVERY 6 HOURS PRN
Status: DISCONTINUED | OUTPATIENT
Start: 2025-04-17 | End: 2025-04-19 | Stop reason: HOSPADM

## 2025-04-17 RX ORDER — 0.9 % SODIUM CHLORIDE 0.9 %
2000 INTRAVENOUS SOLUTION INTRAVENOUS ONCE
Status: COMPLETED | OUTPATIENT
Start: 2025-04-17 | End: 2025-04-17

## 2025-04-17 RX ORDER — ONDANSETRON 4 MG/1
4 TABLET, ORALLY DISINTEGRATING ORAL EVERY 8 HOURS PRN
Status: DISCONTINUED | OUTPATIENT
Start: 2025-04-17 | End: 2025-04-19 | Stop reason: HOSPADM

## 2025-04-17 RX ORDER — SODIUM CHLORIDE 0.9 % (FLUSH) 0.9 %
5-40 SYRINGE (ML) INJECTION EVERY 12 HOURS SCHEDULED
Status: DISCONTINUED | OUTPATIENT
Start: 2025-04-17 | End: 2025-04-19 | Stop reason: HOSPADM

## 2025-04-17 RX ORDER — POTASSIUM CHLORIDE 1500 MG/1
40 TABLET, EXTENDED RELEASE ORAL PRN
Status: DISCONTINUED | OUTPATIENT
Start: 2025-04-17 | End: 2025-04-19 | Stop reason: HOSPADM

## 2025-04-17 RX ORDER — POLYETHYLENE GLYCOL 3350 17 G/17G
17 POWDER, FOR SOLUTION ORAL DAILY PRN
Status: DISCONTINUED | OUTPATIENT
Start: 2025-04-17 | End: 2025-04-19 | Stop reason: HOSPADM

## 2025-04-17 RX ORDER — MIDAZOLAM HYDROCHLORIDE 2 MG/2ML
2 INJECTION, SOLUTION INTRAMUSCULAR; INTRAVENOUS ONCE
Status: COMPLETED | OUTPATIENT
Start: 2025-04-17 | End: 2025-04-17

## 2025-04-17 RX ADMIN — TETANUS TOXOID, REDUCED DIPHTHERIA TOXOID AND ACELLULAR PERTUSSIS VACCINE, ADSORBED 0.5 ML: 5; 2.5; 8; 8; 2.5 SUSPENSION INTRAMUSCULAR at 18:32

## 2025-04-17 RX ADMIN — MIDAZOLAM HYDROCHLORIDE 2 MG: 1 INJECTION, SOLUTION INTRAMUSCULAR; INTRAVENOUS at 19:17

## 2025-04-17 RX ADMIN — SODIUM CHLORIDE 2000 ML: 9 INJECTION, SOLUTION INTRAVENOUS at 18:25

## 2025-04-17 RX ADMIN — ACETAMINOPHEN 650 MG: 650 SUPPOSITORY RECTAL at 18:32

## 2025-04-17 RX ADMIN — SODIUM CHLORIDE 1250 MG: 0.9 INJECTION, SOLUTION INTRAVENOUS at 19:47

## 2025-04-17 RX ADMIN — SODIUM CHLORIDE, PRESERVATIVE FREE 10 ML: 5 INJECTION INTRAVENOUS at 23:03

## 2025-04-17 RX ADMIN — PIPERACILLIN AND TAZOBACTAM 4500 MG: 4; .5 INJECTION, POWDER, LYOPHILIZED, FOR SOLUTION INTRAVENOUS; PARENTERAL at 18:26

## 2025-04-17 RX ADMIN — SODIUM CHLORIDE, SODIUM LACTATE, POTASSIUM CHLORIDE, AND CALCIUM CHLORIDE 500 ML: .6; .31; .03; .02 INJECTION, SOLUTION INTRAVENOUS at 22:26

## 2025-04-17 ASSESSMENT — PAIN - FUNCTIONAL ASSESSMENT: PAIN_FUNCTIONAL_ASSESSMENT: ADULT NONVERBAL PAIN SCALE (NPVS)

## 2025-04-17 NOTE — ED NOTES
Pt sedation makes it inappropriate to administer oral medications at this time due to potential risk for aspiration. MD aware.

## 2025-04-17 NOTE — ED PROVIDER NOTES
Emergency Department Encounter        Pt Name: Marilynn Mccullough  MRN: [id number]  Birthdate 1995  Date of evaluation: 4/17/2025  ED Physician: Manjinder Latif MD    CHIEF COMPLAINT     Triage Chief Complaint:   No chief complaint on file.      HISTORY OF PRESENT ILLNESS & REVIEW OF SYSTEMS     History obtained from the patient and staff and EMS.    Marilynn Mccullough is a 29 y.o. female who presents to the emergency department for evaluation of altered mental status.  History is limited.  Apparently was seen wandering around outside, EMS was called, says that they had brought the patient inside the ambulance and then she began becoming agitated and aggressive so they gave her ketamine.  Had an abrasion to her left eyebrow that was already present.  Otherwise patient not able to give us history            The patient has no other acute complaints at this time.  Review of systems as above.          PAST MED/SURG/SOCIAL/FAM HISTORY & ALLERGY & MEDICATIONS     Past Medical History:   Diagnosis Date    Chiari malformation type I (HCC)     Migraine     Polysubstance abuse      There is no problem list on file for this patient.    No family history on file.    Current Facility-Administered Medications:     vancomycin (VANCOCIN) 1,250 mg in sodium chloride 0.9 % 250 mL (addEASE) IVPB, 25 mg/kg (Adjusted), IntraVENous, Once, Manjinder Latif MD, 1,250 mg at 04/17/25 1947    potassium chloride (KLOR-CON M) extended release tablet 20 mEq, 20 mEq, Oral, Once, Manjinder Latif MD    OLANZapine zydis (ZYPREXA) disintegrating tablet 20 mg, 20 mg, Oral, Once, Manjinder Latif MD    Current Outpatient Medications:     dicyclomine (BENTYL) 20 MG tablet, Take 1 tablet by mouth See Admin Instructions One tablet by mouth every 6-8 hours when necessary for abdominal pain or cramping. (Patient not taking: Reported on 4/5/2025), Disp: 20 tablet, Rfl: 0  Previous Medications    DICYCLOMINE (BENTYL) 20 MG TABLET    Take 1

## 2025-04-18 LAB
ALBUMIN SERPL-MCNC: 3 G/DL (ref 3.4–5)
ALBUMIN/GLOB SERPL: 1.1 {RATIO} (ref 1.1–2.2)
ALP SERPL-CCNC: 54 U/L (ref 40–129)
ALT SERPL-CCNC: 23 U/L (ref 10–40)
ANION GAP SERPL CALCULATED.3IONS-SCNC: 10 MMOL/L (ref 9–17)
AST SERPL-CCNC: 34 U/L (ref 15–37)
BASOPHILS # BLD: 0.03 K/UL
BASOPHILS NFR BLD: 0 % (ref 0–1)
BILIRUB SERPL-MCNC: 0.6 MG/DL (ref 0–1)
BUN SERPL-MCNC: 4 MG/DL (ref 7–20)
CALCIUM SERPL-MCNC: 8.1 MG/DL (ref 8.3–10.6)
CHLORIDE SERPL-SCNC: 105 MMOL/L (ref 99–110)
CO2 SERPL-SCNC: 23 MMOL/L (ref 21–32)
CREAT SERPL-MCNC: 0.5 MG/DL (ref 0.6–1.1)
EKG ATRIAL RATE: 150 BPM
EKG DIAGNOSIS: NORMAL
EKG Q-T INTERVAL: 332 MS
EKG QRS DURATION: 78 MS
EKG QTC CALCULATION (BAZETT): 526 MS
EKG R AXIS: 84 DEGREES
EKG T AXIS: 70 DEGREES
EKG VENTRICULAR RATE: 151 BPM
EOSINOPHIL # BLD: 0.14 K/UL
EOSINOPHILS RELATIVE PERCENT: 1 % (ref 0–3)
ERYTHROCYTE [DISTWIDTH] IN BLOOD BY AUTOMATED COUNT: 12.4 % (ref 11.7–14.9)
GFR, ESTIMATED: >90 ML/MIN/1.73M2
GLUCOSE SERPL-MCNC: 91 MG/DL (ref 74–99)
HCT VFR BLD AUTO: 29.5 % (ref 37–47)
HGB BLD-MCNC: 9.9 G/DL (ref 12.5–16)
IMM GRANULOCYTES # BLD AUTO: 0.03 K/UL
IMM GRANULOCYTES NFR BLD: 0 %
LACTATE BLDV-SCNC: 1.2 MMOL/L (ref 0.4–2)
LYMPHOCYTES NFR BLD: 1.6 K/UL
LYMPHOCYTES RELATIVE PERCENT: 16 % (ref 24–44)
MAGNESIUM SERPL-MCNC: 2 MG/DL (ref 1.8–2.4)
MCH RBC QN AUTO: 30 PG (ref 27–31)
MCHC RBC AUTO-ENTMCNC: 33.6 G/DL (ref 32–36)
MCV RBC AUTO: 89.4 FL (ref 78–100)
MONOCYTES NFR BLD: 1.15 K/UL
MONOCYTES NFR BLD: 12 % (ref 0–5)
NEUTROPHILS NFR BLD: 70 % (ref 36–66)
NEUTS SEG NFR BLD: 6.91 K/UL
PLATELET # BLD AUTO: 253 K/UL (ref 140–440)
PMV BLD AUTO: 10.1 FL (ref 7.5–11.1)
POTASSIUM SERPL-SCNC: 3 MMOL/L (ref 3.5–5.1)
PROT SERPL-MCNC: 5.8 G/DL (ref 6.4–8.2)
RBC # BLD AUTO: 3.3 M/UL (ref 4.2–5.4)
SODIUM SERPL-SCNC: 139 MMOL/L (ref 136–145)
WBC OTHER # BLD: 9.9 K/UL (ref 4–10.5)

## 2025-04-18 PROCEDURE — 6360000002 HC RX W HCPCS: Performed by: STUDENT IN AN ORGANIZED HEALTH CARE EDUCATION/TRAINING PROGRAM

## 2025-04-18 PROCEDURE — 2500000003 HC RX 250 WO HCPCS: Performed by: STUDENT IN AN ORGANIZED HEALTH CARE EDUCATION/TRAINING PROGRAM

## 2025-04-18 PROCEDURE — 83605 ASSAY OF LACTIC ACID: CPT

## 2025-04-18 PROCEDURE — 85025 COMPLETE CBC W/AUTO DIFF WBC: CPT

## 2025-04-18 PROCEDURE — 80053 COMPREHEN METABOLIC PANEL: CPT

## 2025-04-18 PROCEDURE — 2140000000 HC CCU INTERMEDIATE R&B

## 2025-04-18 PROCEDURE — 83735 ASSAY OF MAGNESIUM: CPT

## 2025-04-18 PROCEDURE — 6370000000 HC RX 637 (ALT 250 FOR IP): Performed by: STUDENT IN AN ORGANIZED HEALTH CARE EDUCATION/TRAINING PROGRAM

## 2025-04-18 PROCEDURE — 93010 ELECTROCARDIOGRAM REPORT: CPT | Performed by: INTERNAL MEDICINE

## 2025-04-18 PROCEDURE — 2580000003 HC RX 258: Performed by: STUDENT IN AN ORGANIZED HEALTH CARE EDUCATION/TRAINING PROGRAM

## 2025-04-18 PROCEDURE — 6360000002 HC RX W HCPCS

## 2025-04-18 PROCEDURE — 85610 PROTHROMBIN TIME: CPT

## 2025-04-18 PROCEDURE — 36415 COLL VENOUS BLD VENIPUNCTURE: CPT

## 2025-04-18 PROCEDURE — 94761 N-INVAS EAR/PLS OXIMETRY MLT: CPT

## 2025-04-18 RX ORDER — LORAZEPAM 2 MG/ML
INJECTION INTRAMUSCULAR
Status: COMPLETED
Start: 2025-04-18 | End: 2025-04-18

## 2025-04-18 RX ORDER — LORAZEPAM 2 MG/ML
2 INJECTION INTRAMUSCULAR ONCE
Status: COMPLETED | OUTPATIENT
Start: 2025-04-18 | End: 2025-04-18

## 2025-04-18 RX ORDER — DIPHENHYDRAMINE HYDROCHLORIDE 50 MG/ML
50 INJECTION, SOLUTION INTRAMUSCULAR; INTRAVENOUS ONCE
Status: COMPLETED | OUTPATIENT
Start: 2025-04-18 | End: 2025-04-18

## 2025-04-18 RX ORDER — DIPHENHYDRAMINE HYDROCHLORIDE 50 MG/ML
INJECTION, SOLUTION INTRAMUSCULAR; INTRAVENOUS
Status: COMPLETED
Start: 2025-04-18 | End: 2025-04-18

## 2025-04-18 RX ORDER — HALOPERIDOL 5 MG/ML
5 INJECTION INTRAMUSCULAR ONCE
Status: COMPLETED | OUTPATIENT
Start: 2025-04-18 | End: 2025-04-18

## 2025-04-18 RX ORDER — HALOPERIDOL 5 MG/ML
INJECTION INTRAMUSCULAR
Status: COMPLETED
Start: 2025-04-18 | End: 2025-04-18

## 2025-04-18 RX ADMIN — WATER 1000 MG: 1 INJECTION INTRAMUSCULAR; INTRAVENOUS; SUBCUTANEOUS at 09:45

## 2025-04-18 RX ADMIN — LORAZEPAM 2 MG: 2 INJECTION INTRAMUSCULAR; INTRAVENOUS at 01:20

## 2025-04-18 RX ADMIN — LORAZEPAM 2 MG: 2 INJECTION INTRAMUSCULAR at 01:20

## 2025-04-18 RX ADMIN — HALOPERIDOL LACTATE 5 MG: 5 INJECTION, SOLUTION INTRAMUSCULAR at 01:16

## 2025-04-18 RX ADMIN — POTASSIUM CHLORIDE 40 MEQ: 1500 TABLET, EXTENDED RELEASE ORAL at 09:56

## 2025-04-18 RX ADMIN — SODIUM CHLORIDE, PRESERVATIVE FREE 10 ML: 5 INJECTION INTRAVENOUS at 10:10

## 2025-04-18 RX ADMIN — DIPHENHYDRAMINE HYDROCHLORIDE 50 MG: 50 INJECTION, SOLUTION INTRAMUSCULAR; INTRAVENOUS at 01:17

## 2025-04-18 RX ADMIN — SODIUM CHLORIDE, SODIUM LACTATE, POTASSIUM CHLORIDE, AND CALCIUM CHLORIDE: .6; .31; .03; .02 INJECTION, SOLUTION INTRAVENOUS at 05:18

## 2025-04-18 RX ADMIN — HALOPERIDOL 5 MG: 5 INJECTION INTRAMUSCULAR at 01:16

## 2025-04-18 ASSESSMENT — PAIN SCALES - WONG BAKER
WONGBAKER_NUMERICALRESPONSE: NO HURT
WONGBAKER_NUMERICALRESPONSE: NO HURT
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
## 2025-04-18 NOTE — PROGRESS NOTES
V2.0  [facility] Hospitalist Progress Note      Name:  Marilynn Mccullough /Age/Sex: 1995  (29 y.o. female)   MRN & CSN:  [id number] & [id number] Encounter Date/Time: 2025 12:50 PM EDT    Location:  [facility]7/3117-A PCP: [facility] (Inactive)       Hospital Day: 2    Assessment and Plan:   Marilynn Mccullough is a 29 y.o. female with pmh of  who presents with Septic shock (HCC)      Plan:         Sepsis versus SIRS due to polysubstance abuse  -On presentation, patient was febrile up to 103.3, was tachycardic, WBC 25.2  -UA shows WBC of 44, large leukocyte esterase  -Due to encephalopathy, unable to determine whether UTI present or not.  -Procalcitonin 0.25  -Patient was initiated on fluid resuscitation.  Was covered with IV vancomycin and Zosyn in the emergency room, IV ceftriaxone continued since  -Follow blood cultures and urine cultures    Lactic acidosis  -Likely in the setting of polysubstance abuse  -Was 11.1 during initial presentation, has improved to 1.2 on repeat    ROLAN  Creatinine on presentation 1 Mg/DL  Improved to 0.5 Mg/DL after IV fluid resuscitation    Hypokalemia  Replete     Acute Metabolic Encephalopathy   Polysubstance Abuse  - presented with agitation and AMS s/p ketamine by EMS and Versed in ED. UDS + for amphetamine and cocaine.   -Monitor for withdrawals      Diet ADULT DIET; Regular   DVT Prophylaxis [] Lovenox, []  Heparin, [] SCDs, [] Ambulation,  [] Eliquis, [] Xarelto  [] Coumadin   Code Status Full Code   Disposition From:   Expected Disposition:   Estimated Date of Discharge:   Patient requires continued admission due to    Surrogate Decision Maker/ POA      Personally reviewed Lab Studies and Imaging         Subjective:     Chief Complaint: No chief complaint on file.       Marilynn Mccullough is a 29 y.o. female who presents with AMS.  Send seen and examined in the morning.  Patient was still drowsy, able to answer questions but inconsistent.  Denies any urinary symptoms.

## 2025-04-18 NOTE — DISCHARGE INSTRUCTIONS
GENERAL INFORMATION AND RESOURCES FOR ADDICTION AND RECOVERY    12-Step Education:  Please attend a 12-Step meeting each day (such as AA or NA).  We recommend you make it a goal to attend 90 meetings in 90 days.  There are online meetings and phone meetings available as well.    You can ask for a temporary sponsor at the first meeting.  Contact your sponsor daily so you can work the 12 Steps together.    Read AA's The Big Book. You can get a copy at any meeting.    Many 12-step programs have free apps to be downloaded onto your smartphone or tablet.    Exercise helps to decrease cravings, therefore exercise as tolerated.     Practice using “HALT\" by making sure you are not getting too Hungry, Angry, Lonely, or Tired.     Avoid people, places, and things that trigger you to use.    Helpful phone numbers/website:   Free Crisis Hotline: 1-123-763-TALK (1-864.285.4507)  24/7 crisis line for [facility]: 655.585.3763  24-hour crisis text hotline: Text the word \"4hope\" to 733-719 for crisis support. Texting this number is free if you have [employer], [employer], [employer] or [employer].  [place] of Access, Homeless Shelter Intake Hotline: 410.381.3462  Smart Recovery www.smartrecOriental [employer].org  [place] Quit Line (smoking) https://[place].quitlogix.org 800-QUIT-NOW (691-659-8210)  Alcoholics Anonymous/ GAUDENCIO/ALATEEN 529-364-5948 or 889-749-6157  https://cogf.meetingfor.me/meetings or www.aacentral[place].org      Narcotics Anonymous 494-1918 or 837-534-4841   http://sascna.org/ or www.nacentral[place].org  Cocaine Anonymous 180-015-7795 www.caoh[place].org   Marijuana Anonymous 132-127-2744 www.marijuana-anonymous.org   Mental Health Crisis Line: 256.782.1581  [place] [facility]: 385.896.6574  [employer]/vj   [place] Drug Information Treatment and Referral Hotline (822) 474.HELP  Information & Referral for [facility]  Referral line to connect with available resources/services  [facility] 211/323-1400 [place]

## 2025-04-18 NOTE — PROGRESS NOTES
4 Eyes Skin Assessment     NAME:  Marilynn Mccullough  YOB: 1995  MEDICAL RECORD NUMBER:  [id number]    The patient is being assessed for  Admission    I agree that at least one RN has performed a thorough Head to Toe Skin Assessment on the patient. ALL assessment sites listed below have been assessed.      Areas assessed by both nurses:    Head, Face, Ears, Shoulders, Back, Chest, Arms, Elbows, Hands, Sacrum. Buttock, Coccyx, Ischium, Legs. Feet and Heels, and Under Medical Devices         Does the Patient have a Wound? No noted wound(s)       Madhu Prevention initiated by RN: No  Wound Care Orders initiated by RN: No    Pressure Injury (Stage 3,4, Unstageable, DTI, NWPT, and Complex wounds) if present, place Wound referral order by RN under : No    New Ostomies, if present place, Ostomy referral order under : No     Nurse 1 eSignature: Electronically signed by Mylene Brock RN on 4/18/25 at 12:10 AM EDT    **SHARE this note so that the co-signing nurse can place an eSignature**    Nurse 2 eSignature: Electronically signed by Amalia Scott RN on 4/18/25 at 1:27 AM EDT

## 2025-04-18 NOTE — PROGRESS NOTES
Unable to complete admission questions and assessment at this time. Patient lethargic and  She responds to painful stimuli and voice. Will continue to monitor.

## 2025-04-18 NOTE — CARE COORDINATION
Reviewed medical record at this time. Pt has PCP and insurance. Pt has drug abuse problem. Pt violent and non compliant. Currently only oriented x1. CM unable to complete discharge planning assessment at this time.

## 2025-04-18 NOTE — H&P
History and Physical      Name:  Marilynn Mccullough /Age/Sex: 1995  (29 y.o. female)   MRN & CSN:  [id number] & [id number] Encounter Date/Time: 2025 8:36 PM EDT   Location:  [facility] PCP: [facility], Unspecified (Inactive)       Hospital Day: 1    Assessment and Plan:     Patient is a 29 y.o. female who presented with AMS    Septic Shock 2/2 Pyelo   Lactic Acidosis   - fever of 103.3, WBC 25 and lactic of 11 s/p 30cc/kg vanc and zosyn in ED. Continue Rocephin pending cultures     Acute Metabolic Encephalopathy   Polysubstance Abuse  - presented with agitation and AMS s/p ketamine by EMS and Versed in ED. UDS + for amphetamine and cocaine.     Hypokalemia  - Replete     Checklist:  Advanced directive: full  Diet: cardiac   DVT ppx:Lovenox       Disposition: admit to inpatient.  Estimated discharge: 4 day(s).  Current living situation: home.  Expected disposition: home.    Spoke with ED provider who recommended admission for the patient and I agree with that plan.  Personally reviewed lab studies and imaging.  EKG interpreted personally and results as stated above.  Imaging that was interpreted personally and results as stated above.    History of Present Illness:     Chief Complaint:  AMS      Patient is a 29 y.o. female with a PMHx as above who presented to the ED with AMS. Patient presents via EMS due to AMS and unusual behavior. Was found allegedly walking outside and while with EMS was agitated and aggressive s/p ketamine. Was agitated and combative on arrival to the ED s/p Versed. Currently sedated and unarousable.     History obtained from: Patient, ED provider, EMR     ROS:     Unable to perform due to changes in mental status     Objective:     No intake or output data in the 24 hours ending 25     Vitals:   Vitals:    25 1901 25 19125   BP: 128/63 128/63 (!) 108/57 (!) 108/57   Pulse: (!) 112 (!) 108 94 (!) 101   Resp: 25 26 21 (!) 36   Temp:

## 2025-04-18 NOTE — ED NOTES
ED TO INPATIENT SBAR HANDOFF    Patient Name: Marilynn Mccullough   :  1995  29 y.o.   Preferred Name    Family/Caregiver Present no   Restraints no   C-SSRS:    Sitter no   Sepsis Risk Score Sepsis V2 Risk Score: 20.9      Situation  No chief complaint on file.    Brief Description of Patient's Condition: Pt presents to the ed with c/o polydrug abuse. Pt was given ketamine in the ambulance and presented to the ed heavily sedated. Upon arrival pt was unable to remain still, and was then medicated with versed and has been resting since. Pt vitals are stable at this time. No resp distress noted. Skin warm and dry. Pt given antibiotics and fluids in addition to sedation medications.   Mental Status: oriented, alert, coherent, logical, thought processes intact, and able to concentrate and follow conversation  Arrived from: home    Imaging:   CT HEAD WO CONTRAST   Final Result      XR CHEST PORTABLE   Final Result        Abnormal labs:   Abnormal Labs Reviewed   CBC WITH AUTO DIFFERENTIAL - Abnormal; Notable for the following components:       Result Value    WBC 25.2 (*)     RBC 3.92 (*)     Hemoglobin 11.7 (*)     Hematocrit 34.6 (*)     Neutrophils % 72 (*)     Lymphocytes % 13 (*)     Monocytes % 14 (*)     Immature Granulocytes % 1 (*)     All other components within normal limits   COMPREHENSIVE METABOLIC PANEL - Abnormal; Notable for the following components:    Sodium 129 (*)     Potassium 3.3 (*)     Chloride 88 (*)     CO2 15 (*)     Anion Gap 26 (*)     Glucose 195 (*)     BUN 6 (*)     AST 54 (*)     All other components within normal limits   LACTATE, SEPSIS - Abnormal; Notable for the following components:    Lactic Acid, Sepsis 11.1 (*)     All other components within normal limits   LIPASE - Abnormal; Notable for the following components:    Lipase 12 (*)     All other components within normal limits   URINE DRUG SCREEN - Abnormal; Notable for the following components:    Amphetamine Screen, Ur

## 2025-04-19 ENCOUNTER — RESULTS FOLLOW-UP (OUTPATIENT)
Dept: EMERGENCY DEPT | Age: 30
End: 2025-04-19

## 2025-04-19 VITALS
HEART RATE: 84 BPM | TEMPERATURE: 97.3 F | BODY MASS INDEX: 18.16 KG/M2 | WEIGHT: 105.8 LBS | RESPIRATION RATE: 18 BRPM | OXYGEN SATURATION: 99 % | SYSTOLIC BLOOD PRESSURE: 122 MMHG | DIASTOLIC BLOOD PRESSURE: 97 MMHG

## 2025-04-19 LAB
ALBUMIN SERPL-MCNC: 3.2 G/DL (ref 3.4–5)
ALBUMIN/GLOB SERPL: 1.1 {RATIO} (ref 1.1–2.2)
ALP SERPL-CCNC: 62 U/L (ref 40–129)
ALT SERPL-CCNC: 25 U/L (ref 10–40)
ANION GAP SERPL CALCULATED.3IONS-SCNC: 10 MMOL/L (ref 9–17)
AST SERPL-CCNC: 25 U/L (ref 15–37)
BASOPHILS # BLD: 0.04 K/UL
BASOPHILS NFR BLD: 1 % (ref 0–1)
BILIRUB SERPL-MCNC: 0.3 MG/DL (ref 0–1)
BUN SERPL-MCNC: 2 MG/DL (ref 7–20)
CALCIUM SERPL-MCNC: 8.8 MG/DL (ref 8.3–10.6)
CHLORIDE SERPL-SCNC: 105 MMOL/L (ref 99–110)
CO2 SERPL-SCNC: 25 MMOL/L (ref 21–32)
CREAT SERPL-MCNC: 0.5 MG/DL (ref 0.6–1.1)
EOSINOPHIL # BLD: 0.15 K/UL
EOSINOPHILS RELATIVE PERCENT: 2 % (ref 0–3)
ERYTHROCYTE [DISTWIDTH] IN BLOOD BY AUTOMATED COUNT: 12.8 % (ref 11.7–14.9)
GFR, ESTIMATED: >90 ML/MIN/1.73M2
GLUCOSE SERPL-MCNC: 107 MG/DL (ref 74–99)
HCT VFR BLD AUTO: 30.4 % (ref 37–47)
HGB BLD-MCNC: 10.2 G/DL (ref 12.5–16)
IMM GRANULOCYTES # BLD AUTO: 0.05 K/UL
IMM GRANULOCYTES NFR BLD: 1 %
LYMPHOCYTES NFR BLD: 1.32 K/UL
LYMPHOCYTES RELATIVE PERCENT: 15 % (ref 24–44)
MCH RBC QN AUTO: 30.1 PG (ref 27–31)
MCHC RBC AUTO-ENTMCNC: 33.6 G/DL (ref 32–36)
MCV RBC AUTO: 89.7 FL (ref 78–100)
MICROORGANISM SPEC CULT: ABNORMAL
MONOCYTES NFR BLD: 0.88 K/UL
MONOCYTES NFR BLD: 10 % (ref 0–5)
NEUTROPHILS NFR BLD: 72 % (ref 36–66)
NEUTS SEG NFR BLD: 6.26 K/UL
PLATELET # BLD AUTO: 293 K/UL (ref 140–440)
PMV BLD AUTO: 10.3 FL (ref 7.5–11.1)
POTASSIUM SERPL-SCNC: 3.6 MMOL/L (ref 3.5–5.1)
PROT SERPL-MCNC: 6.2 G/DL (ref 6.4–8.2)
RBC # BLD AUTO: 3.39 M/UL (ref 4.2–5.4)
SODIUM SERPL-SCNC: 140 MMOL/L (ref 136–145)
SPECIMEN DESCRIPTION: ABNORMAL
WBC OTHER # BLD: 8.7 K/UL (ref 4–10.5)

## 2025-04-19 PROCEDURE — 36415 COLL VENOUS BLD VENIPUNCTURE: CPT

## 2025-04-19 PROCEDURE — 85610 PROTHROMBIN TIME: CPT

## 2025-04-19 PROCEDURE — 85025 COMPLETE CBC W/AUTO DIFF WBC: CPT

## 2025-04-19 PROCEDURE — 80053 COMPREHEN METABOLIC PANEL: CPT

## 2025-04-19 RX ORDER — CIPROFLOXACIN 500 MG/1
500 TABLET, FILM COATED ORAL 2 TIMES DAILY
Qty: 18 TABLET | Refills: 0 | Status: SHIPPED | OUTPATIENT
Start: 2025-04-19 | End: 2025-04-19

## 2025-04-19 RX ORDER — CIPROFLOXACIN 500 MG/1
500 TABLET, FILM COATED ORAL 2 TIMES DAILY
Qty: 18 TABLET | Refills: 0 | Status: SHIPPED | OUTPATIENT
Start: 2025-04-19 | End: 2025-04-28

## 2025-04-19 ASSESSMENT — PAIN SCALES - WONG BAKER: WONGBAKER_NUMERICALRESPONSE: NO HURT

## 2025-04-19 NOTE — PROGRESS NOTES
Patient is being verbally aggressive towards staff at this time. Patient demanding snacks. Patient was provided with snacks by this nurse. Patient is pacing around the room refusing to get in the bed. Patient does not want staff in her room at this time.     Betty Berumen RN

## 2025-04-19 NOTE — PROGRESS NOTES
Medical Hold Decisional Capacity to Leave Assessment for [place] ONLY     The patient is currently experiencing the following medical or mental health condition(s): hallucinations/paranoia         I have evaluated the patient for the capacity necessary to make an informed decision to leave the hospital Against Medical Advice (AMA) on their own and currently the patient is:  Unable to understand their medical or mental health condition(s)   Unable to understand their recommended care and safety plan    Unable to understand the alternative(s) to this care/plan    Unable to understand the risks of leaving the hospital on their own  Unable to explain their decision or desire to leave the hospital on their own        Because of their medical or mental health condition(s), the patient does not have the capacity necessary to make an informed decision to leave the hospital AMA on their own at this time.          Patient's disposition was as follows: Because of the patient's medical or mental health condition(s) and based upon the good-joan exercise of my professional judgment according to appropriate standards of professional practice, the patient's departure from the hospital AMA on their own at this time threatens the safety of the patient or other person(s).  Patient will be kept in the hospital on a Medical Hold and patient's capacity and threat will be reassessed at least every 24 hours.  Once the patient regains/obtains this decisional capacity, the patient will be allowed to leave the hospital AMA on their own if they still desire.      Agnes Carnes MD  Night Physician

## 2025-04-19 NOTE — DISCHARGE SUMMARY
Discharge Summary    Name:  Marilynn Mccullough /Age/Sex: 1995  (29 y.o. female)   MRN & CSN:  [id number] & [id number] Admission Date/Time: 2025  5:31 PM   Attending:  No att. providers found Discharging Physician: Chanell Caldwell MD         Discharge Diagnosis:  Septic shock  Lactic acidosis  ROLAN  Hypokalemia  Acute metabolic encephalopathy  Polysubstance abuse      Discharge Exam  Physical Exam  Vitals:    25 1729   BP: (!) 122/97   Pulse: 84   Resp: 18   SpO2: 99%        Hospital Course:   Marilynn Mccullough is a 29 y.o.  female  who presents with Septic shock (HCC)    Septic shock  E. coli bacteremia  -On presentation, patient was febrile up to 103.3, was tachycardic, WBC 25.2  -UA shows WBC of 44, large leukocyte esterase  -patient states she had symptoms of UTI  -Procalcitonin 0.25  -Patient was initiated on fluid resuscitation.  Was covered with IV vancomycin and Zosyn in the emergency room, IV ceftriaxone continued since  - Blood cultures positive for E. coli.  Antibiotics changed to oral ciprofloxacin per sensitivity and urine    Lactic acidosis  -Likely in the setting of polysubstance abuse  -Was 11.1 during initial presentation, has improved to 1.2 on repeat     ROLAN  Creatinine on presentation 1 Mg/DL  Improved to 0.5 Mg/DL after IV fluid resuscitation     Hypokalemia  Replete     Acute Metabolic Encephalopathy   Polysubstance Abuse  - presented with agitation and AMS s/p ketamine by EMS and Versed in ED. UDS + for amphetamine and cocaine.   -Monitor for withdrawals      Patient was placed on medical hold on 2025 overnight.  Patient wanting to be discharged in the morning.  On my evaluation, patient was alert, oriented x 4.  Discussed about bacteremia and need to we will continue antibiotics for 10 days total.  She verbalized understanding.  Strongly suggested against polysubstance abuse.  Gave paper prescription for antibiotics.    The patient expressed appropriate understanding of

## 2025-04-19 NOTE — PROGRESS NOTES
Patient is refusing care,confused, and combative with staff no new orders at this time and no  new medical hold

## 2025-04-19 NOTE — PROGRESS NOTES
This nurse went into pts room to introduce self after observing  leave the room in tears. This nurse asked the tech if she was ok. Tech responded she was ok, that the pt was verbally abusive. Dietary approached the room at this time and tray taken from dietary staff and this nurse took it into pts room. This nurse sat tray down on pts counter. Pt was observed to be in the bathroom. Bed alarm turned off at this time. PT exited the bathroom and this nurse introduced self to patient and explained I would be caring for her today. PT stated that I was rude and began walking towards me with her fork and knife aggressively stating that I was stealing her air and rude for asking her how she is feeling. Bed alarm was set off again and this nurse went back to pts room with PCT Ty. This nurse turned pts bed alarm off. Pt began to accuse the PCT as well as myself for being rude. This nurse and staff exited pts room. PT exited room shortly after with blanket wrapped around shoulders and fork in hand. PT was pacing the halls. This nurse followed pt at a safe distance and called the charge nurse, Yuliya. Hospital security called d/t pt being on a medical hold. Pt returned to room and then exited again to have a conversation with security once they arrived on unit. PT was verbalizing repeatedly that she wanted to leave AMA. This nurse messaged physician to see the plan for the patient. Notified that pt is refusing all care, vss, and tele. Doctor came to bedside and reviewed cultures with pt and notified pt that she had a UTI and educated pt on bacteremia. PT verbalized understanding. Script written for pt for antibiotics. Educated pt on how to take medication. N.O. placed for pt to be dc. Ivs removed and pt escorted to exit by this nurse.

## 2025-04-20 LAB
ACB COMPLEX DNA BLD POS QL NAA+NON-PROBE: NOT DETECTED
B FRAGILIS DNA BLD POS QL NAA+NON-PROBE: NOT DETECTED
BLACTX-M ISLT/SPM QL: NOT DETECTED
BLAIMP ISLT/SPM QL: NOT DETECTED
BLAKPC ISLT/SPM QL: NOT DETECTED
BLAOXA-48-LIKE ISLT/SPM QL: NOT DETECTED
BLAVIM ISLT/SPM QL: NOT DETECTED
C ALBICANS DNA BLD POS QL NAA+NON-PROBE: NOT DETECTED
C AURIS DNA BLD POS QL NAA+NON-PROBE: NOT DETECTED
C GATTII+NEOFOR DNA BLD POS QL NAA+N-PRB: NOT DETECTED
C GLABRATA DNA BLD POS QL NAA+NON-PROBE: NOT DETECTED
C KRUSEI DNA BLD POS QL NAA+NON-PROBE: NOT DETECTED
C PARAP DNA BLD POS QL NAA+NON-PROBE: NOT DETECTED
C TROPICLS DNA BLD POS QL NAA+NON-PROBE: NOT DETECTED
COLISTIN RES MCR-1 ISLT/SPM QL: NOT DETECTED
E CLOAC COMP DNA BLD POS NAA+NON-PROBE: NOT DETECTED
E COLI DNA BLD POS QL NAA+NON-PROBE: DETECTED
E FAECALIS DNA BLD POS QL NAA+NON-PROBE: NOT DETECTED
E FAECIUM DNA BLD POS QL NAA+NON-PROBE: NOT DETECTED
ENTEROBACTERALES DNA BLD POS NAA+N-PRB: DETECTED
GP B STREP DNA BLD POS QL NAA+NON-PROBE: NOT DETECTED
HAEM INFLU DNA BLD POS QL NAA+NON-PROBE: NOT DETECTED
K OXYTOCA DNA BLD POS QL NAA+NON-PROBE: NOT DETECTED
KLEBSIELLA SP DNA BLD POS QL NAA+NON-PRB: NOT DETECTED
KLEBSIELLA SP DNA BLD POS QL NAA+NON-PRB: NOT DETECTED
L MONOCYTOG DNA BLD POS QL NAA+NON-PROBE: NOT DETECTED
MICROORGANISM SPEC CULT: ABNORMAL
MICROORGANISM SPEC CULT: NORMAL
MICROORGANISM/AGENT SPEC: ABNORMAL
N MEN DNA BLD POS QL NAA+NON-PROBE: NOT DETECTED
P AERUGINOSA DNA BLD POS NAA+NON-PROBE: NOT DETECTED
PROTEUS SP DNA BLD POS QL NAA+NON-PROBE: NOT DETECTED
RESISTANT GENE NDM BY PCR: NOT DETECTED
S AUREUS DNA BLD POS QL NAA+NON-PROBE: NOT DETECTED
S AUREUS+CONS DNA BLD POS NAA+NON-PROBE: NOT DETECTED
S EPIDERMIDIS DNA BLD POS QL NAA+NON-PRB: NOT DETECTED
S LUGDUNENSIS DNA BLD POS QL NAA+NON-PRB: NOT DETECTED
S MALTOPHILIA DNA BLD POS QL NAA+NON-PRB: NOT DETECTED
S MARCESCENS DNA BLD POS NAA+NON-PROBE: NOT DETECTED
S PNEUM DNA BLD POS QL NAA+NON-PROBE: NOT DETECTED
S PYO DNA BLD POS QL NAA+NON-PROBE: ABNORMAL
SALMONELLA DNA BLD POS QL NAA+NON-PROBE: NOT DETECTED
SERVICE CMNT-IMP: ABNORMAL
SERVICE CMNT-IMP: NORMAL
SPECIMEN DESCRIPTION: ABNORMAL
SPECIMEN DESCRIPTION: NORMAL
STREPTOCOCCUS DNA BLD POS NAA+NON-PROBE: NOT DETECTED

## 2025-04-23 LAB
MICROORGANISM SPEC CULT: NORMAL
SERVICE CMNT-IMP: NORMAL
SPECIMEN DESCRIPTION: NORMAL

## 2025-05-30 ENCOUNTER — HOSPITAL ENCOUNTER (EMERGENCY)
Age: 30
Discharge: PSYCHIATRIC OR SUBSTANCE USE FACILITY | End: 2025-05-31
Payer: MEDICAID

## 2025-05-30 DIAGNOSIS — F19.959 DRUG-INDUCED PSYCHOTIC DISORDER WITH COMPLICATION (HCC): ICD-10-CM

## 2025-05-30 DIAGNOSIS — E87.6 HYPOKALEMIA: ICD-10-CM

## 2025-05-30 DIAGNOSIS — F19.10 POLYSUBSTANCE ABUSE (HCC): Primary | ICD-10-CM

## 2025-05-30 DIAGNOSIS — M62.82 NON-TRAUMATIC RHABDOMYOLYSIS: ICD-10-CM

## 2025-05-30 LAB
ALBUMIN SERPL-MCNC: 4.2 G/DL (ref 3.4–5)
ALBUMIN/GLOB SERPL: 1.4 {RATIO} (ref 1.1–2.2)
ALP SERPL-CCNC: 58 U/L (ref 40–129)
ALT SERPL-CCNC: 34 U/L (ref 10–40)
AMPHET UR QL SCN: POSITIVE
ANION GAP SERPL CALCULATED.3IONS-SCNC: 19 MMOL/L (ref 9–17)
APAP SERPL-MCNC: <5 UG/ML (ref 10–30)
AST SERPL-CCNC: 67 U/L (ref 15–37)
BARBITURATES UR QL SCN: NEGATIVE
BENZODIAZ UR QL: NEGATIVE
BILIRUB SERPL-MCNC: 0.9 MG/DL (ref 0–1)
BUN SERPL-MCNC: 12 MG/DL (ref 7–20)
CALCIUM SERPL-MCNC: 8.8 MG/DL (ref 8.3–10.6)
CANNABINOIDS UR QL SCN: POSITIVE
CHLORIDE SERPL-SCNC: 98 MMOL/L (ref 99–110)
CK SERPL-CCNC: 1800 U/L (ref 26–192)
CO2 SERPL-SCNC: 19 MMOL/L (ref 21–32)
COCAINE UR QL SCN: POSITIVE
CREAT SERPL-MCNC: 0.8 MG/DL (ref 0.6–1.1)
ERYTHROCYTE [DISTWIDTH] IN BLOOD BY AUTOMATED COUNT: 12.8 % (ref 11.7–14.9)
ETHANOLAMINE SERPL-MCNC: <10 MG/DL (ref 0–0.08)
FENTANYL UR QL: NEGATIVE
GFR, ESTIMATED: >90 ML/MIN/1.73M2
GLUCOSE SERPL-MCNC: 95 MG/DL (ref 74–99)
HCG UR QL: NEGATIVE
HCT VFR BLD AUTO: 33.2 % (ref 37–47)
HGB BLD-MCNC: 11.4 G/DL (ref 12.5–16)
MCH RBC QN AUTO: 28.6 PG (ref 27–31)
MCHC RBC AUTO-ENTMCNC: 34.3 G/DL (ref 32–36)
MCV RBC AUTO: 83.2 FL (ref 78–100)
OPIATES UR QL SCN: NEGATIVE
OXYCODONE UR QL SCN: NEGATIVE
PLATELET # BLD AUTO: 268 K/UL (ref 140–440)
PMV BLD AUTO: 9.3 FL (ref 7.5–11.1)
POTASSIUM SERPL-SCNC: 2.9 MMOL/L (ref 3.5–5.1)
PROT SERPL-MCNC: 7.1 G/DL (ref 6.4–8.2)
RBC # BLD AUTO: 3.99 M/UL (ref 4.2–5.4)
SALICYLATES SERPL-MCNC: <0.5 MG/DL (ref 15–30)
SODIUM SERPL-SCNC: 136 MMOL/L (ref 136–145)
TEST INFORMATION: ABNORMAL
WBC OTHER # BLD: 10.5 K/UL (ref 4–10.5)

## 2025-05-30 PROCEDURE — 93005 ELECTROCARDIOGRAM TRACING: CPT

## 2025-05-30 PROCEDURE — 96372 THER/PROPH/DIAG INJ SC/IM: CPT

## 2025-05-30 PROCEDURE — 84703 CHORIONIC GONADOTROPIN ASSAY: CPT

## 2025-05-30 PROCEDURE — 80307 DRUG TEST PRSMV CHEM ANLYZR: CPT

## 2025-05-30 PROCEDURE — 2580000003 HC RX 258

## 2025-05-30 PROCEDURE — 80179 DRUG ASSAY SALICYLATE: CPT

## 2025-05-30 PROCEDURE — 99285 EMERGENCY DEPT VISIT HI MDM: CPT

## 2025-05-30 PROCEDURE — 96365 THER/PROPH/DIAG IV INF INIT: CPT

## 2025-05-30 PROCEDURE — 80143 DRUG ASSAY ACETAMINOPHEN: CPT

## 2025-05-30 PROCEDURE — 82550 ASSAY OF CK (CPK): CPT

## 2025-05-30 PROCEDURE — G0480 DRUG TEST DEF 1-7 CLASSES: HCPCS

## 2025-05-30 PROCEDURE — 6360000002 HC RX W HCPCS

## 2025-05-30 PROCEDURE — 80053 COMPREHEN METABOLIC PANEL: CPT

## 2025-05-30 PROCEDURE — 85027 COMPLETE CBC AUTOMATED: CPT

## 2025-05-30 PROCEDURE — 6360000002 HC RX W HCPCS: Performed by: STUDENT IN AN ORGANIZED HEALTH CARE EDUCATION/TRAINING PROGRAM

## 2025-05-30 PROCEDURE — 6370000000 HC RX 637 (ALT 250 FOR IP)

## 2025-05-30 RX ORDER — MAGNESIUM SULFATE IN WATER 40 MG/ML
2000 INJECTION, SOLUTION INTRAVENOUS ONCE
Status: COMPLETED | OUTPATIENT
Start: 2025-05-30 | End: 2025-05-30

## 2025-05-30 RX ORDER — POTASSIUM CHLORIDE 7.45 MG/ML
10 INJECTION INTRAVENOUS
Status: DISCONTINUED | OUTPATIENT
Start: 2025-05-30 | End: 2025-05-30

## 2025-05-30 RX ORDER — DIPHENHYDRAMINE HYDROCHLORIDE 50 MG/ML
25 INJECTION, SOLUTION INTRAMUSCULAR; INTRAVENOUS ONCE
Status: COMPLETED | OUTPATIENT
Start: 2025-05-30 | End: 2025-05-30

## 2025-05-30 RX ORDER — 0.9 % SODIUM CHLORIDE 0.9 %
2000 INTRAVENOUS SOLUTION INTRAVENOUS ONCE
Status: COMPLETED | OUTPATIENT
Start: 2025-05-30 | End: 2025-05-30

## 2025-05-30 RX ORDER — LORAZEPAM 1 MG/1
2 TABLET ORAL ONCE
Status: COMPLETED | OUTPATIENT
Start: 2025-05-30 | End: 2025-05-30

## 2025-05-30 RX ORDER — DIAZEPAM 10 MG/2ML
10 INJECTION, SOLUTION INTRAMUSCULAR; INTRAVENOUS ONCE
Status: COMPLETED | OUTPATIENT
Start: 2025-05-30 | End: 2025-05-30

## 2025-05-30 RX ORDER — POTASSIUM CHLORIDE 1500 MG/1
40 TABLET, EXTENDED RELEASE ORAL ONCE
Status: DISCONTINUED | OUTPATIENT
Start: 2025-05-30 | End: 2025-05-31 | Stop reason: HOSPADM

## 2025-05-30 RX ORDER — POTASSIUM CHLORIDE 7.45 MG/ML
10 INJECTION INTRAVENOUS
Status: ACTIVE | OUTPATIENT
Start: 2025-05-30 | End: 2025-05-30

## 2025-05-30 RX ORDER — DIPHENHYDRAMINE HYDROCHLORIDE 50 MG/ML
25 INJECTION, SOLUTION INTRAMUSCULAR; INTRAVENOUS ONCE
Status: DISCONTINUED | OUTPATIENT
Start: 2025-05-30 | End: 2025-05-30

## 2025-05-30 RX ORDER — HALOPERIDOL 5 MG/ML
2 INJECTION INTRAMUSCULAR ONCE
Status: COMPLETED | OUTPATIENT
Start: 2025-05-30 | End: 2025-05-30

## 2025-05-30 RX ORDER — DIAZEPAM 10 MG/2ML
10 INJECTION, SOLUTION INTRAMUSCULAR; INTRAVENOUS ONCE
Status: DISCONTINUED | OUTPATIENT
Start: 2025-05-30 | End: 2025-05-30

## 2025-05-30 RX ADMIN — DIAZEPAM 10 MG: 5 INJECTION, SOLUTION INTRAMUSCULAR; INTRAVENOUS at 16:30

## 2025-05-30 RX ADMIN — LORAZEPAM 2 MG: 1 TABLET ORAL at 16:17

## 2025-05-30 RX ADMIN — HALOPERIDOL LACTATE 2 MG: 5 INJECTION, SOLUTION INTRAMUSCULAR at 16:30

## 2025-05-30 RX ADMIN — DIPHENHYDRAMINE HYDROCHLORIDE 25 MG: 50 INJECTION, SOLUTION INTRAMUSCULAR; INTRAVENOUS at 16:30

## 2025-05-30 RX ADMIN — SODIUM CHLORIDE 2000 ML: 0.9 INJECTION, SOLUTION INTRAVENOUS at 20:39

## 2025-05-30 RX ADMIN — MAGNESIUM SULFATE HEPTAHYDRATE 2000 MG: 40 INJECTION, SOLUTION INTRAVENOUS at 21:57

## 2025-05-30 NOTE — PROGRESS NOTES
Received request for Telepsychiatry evaluation.  Medical screening labs are still pending.  Our team will continue to follow and will initiate evaluation when more information is available     Pt labs are still pending, pt has significant medial hx and CALLY hx. Pt was medicated at 1630, as a result unable to participate appropriately in assessment

## 2025-05-30 NOTE — PROGRESS NOTES
Writer spoke with nurse, pt is still asleep as a result of chemical restraints. Pt unable to be assessed at this time

## 2025-05-30 NOTE — ED TRIAGE NOTES
Pt to the ED via EMS with police pink slipped. PD was called out due to pt walking into traffic. Pt is not speaking coherently on arrival, but does state that people are out to get her.

## 2025-05-31 VITALS
OXYGEN SATURATION: 99 % | WEIGHT: 105 LBS | SYSTOLIC BLOOD PRESSURE: 114 MMHG | RESPIRATION RATE: 18 BRPM | HEART RATE: 81 BPM | BODY MASS INDEX: 18.02 KG/M2 | TEMPERATURE: 98.7 F | DIASTOLIC BLOOD PRESSURE: 81 MMHG

## 2025-05-31 LAB — CK SERPL-CCNC: 1002 U/L (ref 26–192)

## 2025-05-31 PROCEDURE — 36415 COLL VENOUS BLD VENIPUNCTURE: CPT

## 2025-05-31 PROCEDURE — 82550 ASSAY OF CK (CPK): CPT

## 2025-05-31 NOTE — ED NOTES
Attempted to wake pt up to try to talk to our telepsych. Pt states \"fuck you, fuck off. I'm going to the park and I'm not talking to them.\" Dr. Harper and telepsych made aware

## 2025-05-31 NOTE — ED NOTES
PT accepted to [facility]     Accepting provider is Dr Oliveros    N2N is [phone]    Room # 4th Floor    Transport time is  [place] 1025

## 2025-05-31 NOTE — ED NOTES
Pt rambling unable to hold conversation appropriately. Currently eating breakfast but dosing off and quickly waking back up. Sitter remains at bedside.

## 2025-05-31 NOTE — ED PROVIDER NOTES
eMERGENCY dEPARTMENT eNCOUnter    ATTENDING SIGN OUT NOTE    HPI/Physical Exam/Medical Decision Making  Time: 6:00 am  I have received sign out of Marilynn Mccullough's  Emergency Department care from Dr. Harper. We discussed the history, physical exam, completed/pending test results (if obtained) and current treatment plan. Please refer to his/her chart for further details.     In brief, the patient is a 29 y.o. female who presented to the ED with acute psychosis with polysubstance abuse.  She is pink slipped.  On workup she was found to have an elevated CK which was discussed with the inpatient team but they did not think she needed admission.  She was treated with IV fluids with improvement of CK from 1800 to 1002 she has hypokalemia with a potassium of 2.9, but has been refusing potassium replacement..  Urine drug screen is positive for amphetamines, cannabinoids and cocaine.  She is reported to me is medically cleared.  She has been seen by telepsych who recommended inpatient psychiatric placement.  She is awaiting placement at this time.    07:00 am  The patient has been accepted to [facility] by Dr. Oliveros.  She is in stable condition awaiting transport.        Clinical Impression:  1. Polysubstance abuse (HCC)    2. Drug-induced psychotic disorder with complication (HCC)    3. Non-traumatic rhabdomyolysis    4. Hypokalemia          Comment: Please note this report has been produced using speech recognition software and may contain errors related to that system including errors in grammar, punctuation, and spelling, as well as words and phrases that may be inappropriate. If there are any questions or concerns please feel free to contact the dictating provider for clarification.        Deborah Henry MD  05/31/25 3794

## 2025-05-31 NOTE — ED PROVIDER NOTES
Transfer of Care Note:   Physician Signing out: Dr Virgen  Receiving Physician: Manjinder Latif MD  Sign out time: 825p      Brief history:  29-year-old female presenting for drug abuse and acute psychosis.  Seen by telepsych recommending inpatient admission.  Pink slipped.  Potassium 2.9 with some nontraumatic rhabdomyolysis with CK of 1800, IV fluids potassium ordered.  Hospitalist felt did not need medical admission.  Medically cleared.  Pending placement.    Items pending that need to be checked:  Placement      Additional Assessment and results:   I have personally performed a diagnostic evaluation on this patient. The patient's initial evaluation and plan have been discussed with the prior physician who initially evaluated the patient. Nursing Notes, Past Medical Hx, Past Surgical Hx, Social Hx, Allergies, vital signs and Family Hx were all reviewed.    Vitals:    05/30/25 1813   BP: (!) 98/58   Pulse: 78   Resp: 16   Temp: 98.7 °F (37.1 °C)   SpO2: 98%           Labs Reviewed   CBC - Abnormal; Notable for the following components:       Result Value    RBC 3.99 (*)     Hemoglobin 11.4 (*)     Hematocrit 33.2 (*)     All other components within normal limits   COMPREHENSIVE METABOLIC PANEL - Abnormal; Notable for the following components:    Potassium 2.9 (*)     Chloride 98 (*)     CO2 19 (*)     Anion Gap 19 (*)     AST 67 (*)     All other components within normal limits   URINE DRUG SCREEN - Abnormal; Notable for the following components:    Amphetamine Screen, Ur POSITIVE (*)     Cocaine Metabolite, Urine POSITIVE (*)     Cannabinoid Scrn, Ur POSITIVE (*)     All other components within normal limits   ACETAMINOPHEN LEVEL - Abnormal; Notable for the following components:    Acetaminophen Level <5 (*)     All other components within normal limits   SALICYLATE LEVEL - Abnormal; Notable for the following components:    Salicylate Lvl <0.5 (*)     All other components within normal limits   CK - Abnormal;  Notable for the following components:    Total CK 1,800 (*)     All other components within normal limits   ETHANOL   PREGNANCY, URINE   HCG, QUANTITATIVE, PREGNANCY     Medications   sodium chloride 0.9 % bolus 2,000 mL (2,000 mLs IntraVENous New Bag 5/30/25 2039)   potassium chloride (KLOR-CON M) extended release tablet 40 mEq (has no administration in time range)   potassium chloride 10 mEq/100 mL IVPB (Peripheral Line) ( IntraVENous Canceled Entry 5/30/25 2040)   magnesium sulfate 2000 mg in 50 mL IVPB premix (2,000 mg IntraVENous New Bag 5/30/25 2157)   LORazepam (ATIVAN) tablet 2 mg (2 mg Oral Given 5/30/25 1617)   haloperidol lactate (HALDOL) injection 2 mg (2 mg IntraMUSCular Given 5/30/25 1630)   diphenhydrAMINE (BENADRYL) injection 25 mg (25 mg IntraMUSCular Given 5/30/25 1630)   diazePAM (VALIUM) injection 10 mg (10 mg IntraMUSCular Given 5/30/25 1630)     No orders to display     ED Course as of 05/30/25 2206   Fri May 30, 2025   1748 EKG as interpreted by me  Normal sinus rhythm 86 bpm  Nonspecific ST-T wave changes  No STEMI  QTc of 555  No STEMI [CC]      ED Course User Index  [CC] Brent Virgen,        Further MDM and disposition:   Assessment:   Acute psychosis, elevated CK, hypokalemia  Plan:   Medically cleared seen by telepsych, pink slipped prior to my involvement the case.  Reportedly the hospitalist felt that she did not meet criteria for medical inpatient admission.  I did change the potassium to IV and p.o.  Did also add on serum hCG as we did not have one yet.  Also ordered for formal ADT placement order.  Otherwise awaiting placement.  Patient signed out to my colleague dr springer pending placement      PROCEDURES: (None if blank)  Procedures:     CRITICAL CARE: (None if blank)        Independent Imaging Interpretation by me: please seen ED course/above/below  EKG (if obtained): (All EKG's are interpreted by myself in the absence of a cardiologist) Please see ED

## 2025-05-31 NOTE — VIRTUAL HEALTH
NANCY called the ED to attempt telepsych evaluation. Per RN, patient is refusing to speak with telepsych. NANCY spoke with Dr. Harper. Plan is for patient to be referred to inpatient psych without a telepsych evaluation, due to pt's refusal. Dr. Harper will cancel telepsych consult order.       --NANCY Lara on 5/31/2025 at 2:56 AM    An electronic signature was used to authenticate this note.

## 2025-05-31 NOTE — ED PROVIDER NOTES
Emergency Department Encounter  Location: [facility] EMERGENCY DEPARTMENT    Patient: Marilynn Mccullough  MRN: [id number]  : 1995  Date of evaluation: 2025  ED Provider: Brent Virgen DO    History from : EMS and Law Enforcement  Limitations to history : Intoxication    Chief Complaint:    Mental Health Problem and Addiction Problem    [place]:  Marilynn Mccullough is a 29 y.o. female that presents to the emergency department today for mental health evaluation.  She was brought in by police due to being found growling at people and barking of people.  She stated that people are trying to harm her and that she is going to have to start killing.  Per EMS having visual and auditory hallucinations.  Admitting to drug abuse.  Patient unable to provide history.    Past Medical History:   Diagnosis Date    Chiari malformation type I (HCC)     Migraine     Polysubstance abuse (HCC)      No past surgical history on file.  No family history on file.  Social History     Socioeconomic History    Marital status: Single     Spouse name: Not on file    Number of children: Not on file    Years of education: Not on file    Highest education level: Not on file   Occupational History    Not on file   Tobacco Use    Smoking status: Every Day     Current packs/day: 0.50     Types: Cigarettes    Smokeless tobacco: Never   Vaping Use    Vaping status: Never Used   Substance and Sexual Activity    Alcohol use: Yes    Drug use: Yes     Types: Marijuana (Weed), Methamphetamines (Crystal Meth)     Comment: relapsed last week    Sexual activity: Not on file   Other Topics Concern    Not on file   Social History Narrative    Not on file     Social Drivers of Health     Financial Resource Strain: Not on file   Food Insecurity: Not on file   Transportation Needs: Not on file   Physical Activity: Not on file   Stress: Not on file   Social Connections: Not on file   Intimate Partner Violence: Low Risk  (2018)

## 2025-05-31 NOTE — ED PROVIDER NOTES
Patient received in signout.  In brief she was on a pink slip hold for acute psychosis.  Also present with drug abuse.  On workup she was found to have some mild metabolic abnormalities with mild rhabdomyolysis.  Discussed with inpatient team, does not feel the patient needs admission.  She had required chemical sedation but also received magnesium replacement potassium replacement and IV fluids.  At time of signout she was pending completion of these therapies.  She had already been evaluated by telepsych and recommended for inpatient placement.  Acceptance to facility is pending.    ED Course as of 05/31/25 0302   Fri May 30, 2025   1748 EKG as interpreted by me  Normal sinus rhythm 86 bpm  Nonspecific ST-T wave changes  No STEMI  QTc of 555  No STEMI [CC]   Sat May 31, 2025   0219 Repeat CK level ordered [LA]   0253 I was informed by nursing that apparently patient has not been able to speak to telepsych due to her not wanting to speak to them.  Will attempt again in the morning but otherwise may consult in-house psychiatry [LA]   0301 I spoke with telepsych provider through [employer] messaging.  Given patient's concerning story and lack of insight and poor judgment as evidenced by her behavior here we think she should be placed for inpatient psychiatric care. [LA]   0302 ADT 20 ordered [LA]      ED Course User Index  [CC] Brent Virgen DO  [LA] Joby Harper, Joby Zamora DO  05/30/25 2206       Joby Harper DO  05/30/25 2207       Joby Harper DO  05/31/25 0302

## 2025-05-31 NOTE — ED NOTES
Transfer Center Handoff for Behavioral Health Transfers      Patient's Current Location: [facility] EMERGENCY DEPARTMENT     Chief Complaint   Patient presents with    Mental Health Problem    Addiction Problem       Current or History of Violent Behavior: Yes    Currently in Restraints Now or During this Encounter: No  (Specify if Agitation or self harm is noted in ED?)  If yes, please describe behaviors requiring restraint:             Medical Clearance Documented and Verified in the Chart: Yes    No Known Allergies     Can Patient Tolerate Lying Flat: Yes    Able to Perform ADLs:  Yes  (Specify if able to ambulate or uses any mobility devices such as cane or walker)  Activity: In bed  Level of Assistance:    Assistive Device:    Miscellaneous Devices:      LABS    CBC:   Lab Results   Component Value Date/Time    WBC 10.5 05/30/2025 04:46 PM    RBC 3.99 05/30/2025 04:46 PM    HGB 11.4 05/30/2025 04:46 PM    HCT 33.2 05/30/2025 04:46 PM    MCV 83.2 05/30/2025 04:46 PM    MCH 28.6 05/30/2025 04:46 PM    MCHC 34.3 05/30/2025 04:46 PM    RDW 12.8 05/30/2025 04:46 PM     05/30/2025 04:46 PM    MPV 9.3 05/30/2025 04:46 PM     CMP:   Lab Results   Component Value Date/Time     05/30/2025 04:46 PM    K 2.9 05/30/2025 04:46 PM    K 4.1 04/05/2025 11:39 AM    CL 98 05/30/2025 04:46 PM    CO2 19 05/30/2025 04:46 PM    BUN 12 05/30/2025 04:46 PM    CREATININE 0.8 05/30/2025 04:46 PM    GFRAA >60 05/10/2022 12:40 PM    AGRATIO 1.5 04/05/2025 11:39 AM    LABGLOM >90 05/30/2025 04:46 PM    LABGLOM >60 10/29/2023 04:55 AM    GLUCOSE 95 05/30/2025 04:46 PM    CALCIUM 8.8 05/30/2025 04:46 PM    BILITOT 0.9 05/30/2025 04:46 PM    ALKPHOS 58 05/30/2025 04:46 PM    AST 67 05/30/2025 04:46 PM    ALT 34 05/30/2025 04:46 PM     Drug Panel:   Lab Results   Component Value Date/Time    OPIAU NEGATIVE 05/30/2025 05:23 PM     UA:  Lab Results   Component Value Date/Time    COLORU Yellow 04/17/2025 06:00 PM

## 2025-05-31 NOTE — PROGRESS NOTES
Telepsych attempted for the third time, pt still asleep. Nurse attempted to wake pt up will on the phone with writer, however pt did not awake. Consult will continue to be delayed until pt can participate.

## 2025-06-01 LAB
EKG ATRIAL RATE: 86 BPM
EKG DIAGNOSIS: NORMAL
EKG P AXIS: 89 DEGREES
EKG P-R INTERVAL: 146 MS
EKG Q-T INTERVAL: 464 MS
EKG QRS DURATION: 88 MS
EKG QTC CALCULATION (BAZETT): 555 MS
EKG R AXIS: 83 DEGREES
EKG T AXIS: 71 DEGREES
EKG VENTRICULAR RATE: 86 BPM

## 2025-06-01 PROCEDURE — 93010 ELECTROCARDIOGRAM REPORT: CPT | Performed by: INTERNAL MEDICINE
